# Patient Record
Sex: MALE | Race: ASIAN | Employment: OTHER | URBAN - METROPOLITAN AREA
[De-identification: names, ages, dates, MRNs, and addresses within clinical notes are randomized per-mention and may not be internally consistent; named-entity substitution may affect disease eponyms.]

---

## 2017-01-05 ENCOUNTER — TELEPHONE (OUTPATIENT)
Dept: INTERNAL MEDICINE | Facility: CLINIC | Age: 77
End: 2017-01-05

## 2017-01-05 DIAGNOSIS — E11.21 TYPE 2 DIABETES MELLITUS WITH DIABETIC NEPHROPATHY, WITHOUT LONG-TERM CURRENT USE OF INSULIN (H): Primary | ICD-10-CM

## 2017-01-05 NOTE — TELEPHONE ENCOUNTER
[facility] sent fax requesting order for new Contour Next Glucometer for pt.     Last office visit: 10/26/16    New order placed for pt.

## 2017-01-09 DIAGNOSIS — E11.21 TYPE 2 DIABETES MELLITUS WITH DIABETIC NEPHROPATHY (H): Primary | ICD-10-CM

## 2017-01-09 DIAGNOSIS — I10 ESSENTIAL HYPERTENSION WITH GOAL BLOOD PRESSURE LESS THAN 140/90: ICD-10-CM

## 2017-01-09 NOTE — TELEPHONE ENCOUNTER
Losartan      Last Written Prescription Date: 07/26/16  Last Fill Quantity: 90, # refills: 1  Last Office Visit with G, P or [facility] prescribing provider: 10/26/16   Next 5 appointments (look out 90 days)     Jan 25, 2017  2:00 PM   SHORT with Liam Esquivel MD   [facility] ([facility])    [address]  [facility] [address]   [phone]                   POTASSIUM   Date Value Ref Range Status   07/19/2016 4.0 3.4 - 5.3 mmol/L Final     CREATININE   Date Value Ref Range Status   07/19/2016 1.07 0.66 - 1.25 mg/dL Final     BP Readings from Last 3 Encounters:   10/26/16 118/60   07/26/16 130/60   04/06/16 140/76

## 2017-01-11 RX ORDER — LOSARTAN POTASSIUM 50 MG/1
50 TABLET ORAL DAILY
Qty: 90 TABLET | Refills: 1 | Status: SHIPPED | OUTPATIENT
Start: 2017-01-11 | End: 2017-09-06

## 2017-01-19 DIAGNOSIS — E78.5 HYPERLIPIDEMIA LDL GOAL <100: ICD-10-CM

## 2017-01-19 DIAGNOSIS — E11.21 TYPE 2 DIABETES MELLITUS WITH DIABETIC NEPHROPATHY (H): ICD-10-CM

## 2017-01-19 LAB
ALBUMIN SERPL-MCNC: 3.9 G/DL (ref 3.4–5)
ALP SERPL-CCNC: 57 U/L (ref 40–150)
ALT SERPL W P-5'-P-CCNC: 32 U/L (ref 0–70)
ANION GAP SERPL CALCULATED.3IONS-SCNC: 7 MMOL/L (ref 3–14)
AST SERPL W P-5'-P-CCNC: 26 U/L (ref 0–45)
BILIRUB SERPL-MCNC: 0.9 MG/DL (ref 0.2–1.3)
BUN SERPL-MCNC: 12 MG/DL (ref 7–30)
CALCIUM SERPL-MCNC: 9 MG/DL (ref 8.5–10.1)
CHLORIDE SERPL-SCNC: 104 MMOL/L (ref 94–109)
CHOLEST SERPL-MCNC: 133 MG/DL
CO2 SERPL-SCNC: 27 MMOL/L (ref 20–32)
CREAT SERPL-MCNC: 1.03 MG/DL (ref 0.66–1.25)
GFR SERPL CREATININE-BSD FRML MDRD: 70 ML/MIN/1.7M2
GLUCOSE SERPL-MCNC: 141 MG/DL (ref 70–99)
HBA1C MFR BLD: 7.5 % (ref 4.3–6)
HDLC SERPL-MCNC: 51 MG/DL
LDLC SERPL CALC-MCNC: 60 MG/DL
NONHDLC SERPL-MCNC: 82 MG/DL
POTASSIUM SERPL-SCNC: 3.7 MMOL/L (ref 3.4–5.3)
PROT SERPL-MCNC: 8.5 G/DL (ref 6.8–8.8)
SODIUM SERPL-SCNC: 138 MMOL/L (ref 133–144)
TRIGL SERPL-MCNC: 108 MG/DL

## 2017-01-19 PROCEDURE — 36415 COLL VENOUS BLD VENIPUNCTURE: CPT | Performed by: INTERNAL MEDICINE

## 2017-01-19 PROCEDURE — 80053 COMPREHEN METABOLIC PANEL: CPT | Performed by: INTERNAL MEDICINE

## 2017-01-19 PROCEDURE — 83036 HEMOGLOBIN GLYCOSYLATED A1C: CPT | Performed by: INTERNAL MEDICINE

## 2017-01-19 PROCEDURE — 80061 LIPID PANEL: CPT | Performed by: INTERNAL MEDICINE

## 2017-01-25 ENCOUNTER — OFFICE VISIT (OUTPATIENT)
Dept: INTERNAL MEDICINE | Facility: CLINIC | Age: 77
End: 2017-01-25
Payer: COMMERCIAL

## 2017-01-25 VITALS
DIASTOLIC BLOOD PRESSURE: 70 MMHG | OXYGEN SATURATION: 96 % | RESPIRATION RATE: 12 BRPM | HEART RATE: 87 BPM | SYSTOLIC BLOOD PRESSURE: 134 MMHG | WEIGHT: 173 LBS | BODY MASS INDEX: 33.96 KG/M2 | HEIGHT: 60 IN | TEMPERATURE: 97.7 F

## 2017-01-25 DIAGNOSIS — E11.21 TYPE 2 DIABETES MELLITUS WITH DIABETIC NEPHROPATHY, WITHOUT LONG-TERM CURRENT USE OF INSULIN (H): Primary | ICD-10-CM

## 2017-01-25 DIAGNOSIS — I10 ESSENTIAL HYPERTENSION WITH GOAL BLOOD PRESSURE LESS THAN 140/90: ICD-10-CM

## 2017-01-25 DIAGNOSIS — E78.5 HYPERLIPIDEMIA LDL GOAL <100: ICD-10-CM

## 2017-01-25 PROCEDURE — 99214 OFFICE O/P EST MOD 30 MIN: CPT | Performed by: INTERNAL MEDICINE

## 2017-01-25 NOTE — Clinical Note
"[facility]  [address] [place] [address]  [phone]    1/25/2017    Chip CLIFFORD Rimma  [address]   [facility] [address]           Dear Nuria Rimma,    The results of your lab tests are enclosed. Everything looks fine. Unless noted otherwise below, any results that are outside the \"normal\" range are within acceptable limits and are of no concern.    Hemoglobin A1C measures control of diabetes. Your Hemoglobin A1C is shown. The ideal target is under 7.0, although below 8.0 may be acceptable for some patients.    LDL= Bad Cholesterol-- the target is below 100.     HDL= Good Cholesterol-- although this is determined mostly by heredity, exercise and/or medications may sometimes raise this number.    Triglycerides are another type of fat in the blood, and can sometimes be lowered by reducing intake of sweets or excess carbohydrates, alcohol, and by weight reduction if needed.  Sometimes medications are also used.    AST and ALT are liver tests, as are the bilirubin (total and direct), albumin, total protein, and alkaline phosphatase. Yours are all normal.     Urea Nitrogen and Creatinine are kidney tests--yours are normal. GFR stands for Glomerular Filtration Rate, a more complicated estimate of kidney function.    Sodium, Potassium, Chloride, Carbon Dioxide, and Calcium are all normal salts in the bloodstream. Yours all look normal. Your glucose (blood sugar) also looks fine. (You can ignore the anion gap result).     If you have any further questions or problems, please contact our office.    Sincerely,        Liam Esquivel MD  Attachment: Lab results     "

## 2017-01-25 NOTE — PATIENT INSTRUCTIONS
"Diabetes, LDL-Cholesterol and blood pressure all look fine.     See me back in about four months (Late May or early June, 2017).  Schedule a \"lab only\" appointment a few days before your office appointment with me.   "

## 2017-01-25 NOTE — NURSING NOTE
"Chief Complaint   Patient presents with     Diabetes     Lipids     Hypertension       Initial /70 mmHg  Pulse 87  Temp(Src) 97.7  F (36.5  C) (Oral)  Resp 12  Ht 4' 9\" (1.448 m)  Wt 173 lb (78.472 kg)  BMI 37.43 kg/m2  SpO2 96% Estimated body mass index is 37.43 kg/(m^2) as calculated from the following:    Height as of this encounter: 4' 9\" (1.448 m).    Weight as of this encounter: 173 lb (78.472 kg).  BP completed using cuff size: large  JBuffie CMA      "

## 2017-01-25 NOTE — PROGRESS NOTES
SUBJECTIVE:                                                    Chip Shanks is a 76 year old male who presents to clinic today for the following health issues:  diabetes mellitus, hyperlipidemia, hypertension.     Diabetes Follow-up    Patient is checking blood sugars: once daily.  Results are as follows:         am - 116-128    Diabetic concerns: None     Symptoms of hypoglycemia (low blood sugar): none     Paresthesias (numbness or burning in feet) or sores: No     Date of last diabetic eye exam: 3 months ago     Hyperlipidemia Follow-Up      Rate your low fat/cholesterol diet?: good    Taking statin?  Yes, no muscle aches from statin    Other lipid medications/supplements?:  none     Hypertension Follow-up      Outpatient blood pressures are being checked at home.  Results are 110-130/68.    Low Salt Diet: no added salt       Amount of exercise or physical activity: 6-7 days/week for an average of less than 15 minutes    Problems taking medications regularly: No    Medication side effects: none    Diet: low salt and low fat/cholesterol      Patient states he only takes metformin, no insulin. He relates his AM blood sugars are consistently below 130.    Past records reviewed and discussed for:  Controlled heartburn.   Walking daily.     Problem list and histories reviewed & adjusted, as indicated.  Additional history: as documented    Current Outpatient Prescriptions   Medication Sig Dispense Refill     losartan (COZAAR) 50 MG tablet Take 1 tablet (50 mg) by mouth daily 90 tablet 1     blood glucose monitoring ([employer] CONTOUR MONITOR) meter device kit Use to test blood sugars 2 times daily or as directed. 1 kit 0     simvastatin (ZOCOR) 40 MG tablet Take 1 tablet (40 mg) by mouth daily 90 tablet 0     atenolol (TENORMIN) 50 MG tablet Take 1 tablet (50 mg) by mouth daily 90 tablet 0     metFORMIN (GLUCOPHAGE) 500 MG tablet Take 1 tablet (500 mg) by mouth 2 times daily (with meals) with breakfast and dinner.  180 tablet 0     order for DME Equipment being ordered: Heating pad. 1 each 1     meclizine (ANTIVERT) 25 MG tablet Take 1 tablet (25 mg) by mouth 3 times daily as needed 40 tablet 1     blood glucose monitoring ([employer] CONTOUR) test strip 1 strip by In Vitro route 2 times daily or as directed 200 each 3     blood glucose monitoring (VICTORIAN[employer] MICROLET) lancets 1 each 2 times daily Dispense 200 lancets. 2 Box 3     ranitidine (ZANTAC) 150 MG tablet Take 1 tablet (150 mg) by mouth daily 90 tablet 1     cetirizine (ZYRTEC) 10 MG tablet Take 1 tablet (10 mg) by mouth daily 90 tablet 3     ACETAMINOPHEN PO Take 500 mg by mouth every 8 hours as needed for pain (knee pain)       ORDER FOR DME Equipment being ordered: Cane 1 Units 1     Recent Labs   Lab Test  01/19/17   0824  10/26/16   1449  07/19/16   0759   02/19/16   0759   07/09/14   0758   A1C  7.5*  8.0*  8.0*   --   7.4*   < >  8.6*   LDL  60   --   56   --   55   < >  79   HDL  51   --   49   --   48   < >  41   TRIG  108   --   149   --   143   < >  170*   ALT  32   --   48   --   51   < >  51   CR  1.03   --   1.07   < >  0.90   < >  1.13   GFRESTIMATED  70   --   67   < >  82   < >  64   GFRESTBLACK  85   --   81   < >  >90   GFR Calc     < >  77   POTASSIUM  3.7   --   4.0   < >  3.8   < >  4.3   TSH   --    --   1.33   --    --    --   1.50    < > = values in this interval not displayed.      BP Readings from Last 3 Encounters:   01/25/17 134/70   10/26/16 118/60   07/26/16 130/60    Wt Readings from Last 3 Encounters:   01/25/17 78.472 kg (173 lb)   10/26/16 79.379 kg (175 lb)   07/26/16 79.924 kg (176 lb 3.2 oz)           No dyspnea or cough. No chest discomfort, dizziness or palpitations. No diarrhea, abdominal pain or rectal bleeding.   No acute problems with vision or speech, lateralizing weakness or paresthesias.    No dyspnea or cough. No chest discomfort, dizziness or palpitations. No diarrhea, abdominal pain or rectal bleeding.   No acute  "problems with vision or speech, lateralizing weakness or paresthesias.    ROS: as above or negative for Respiratory, CV, GI, endocrine, neuro systems.     OBJECTIVE:                                                    /70 mmHg  Pulse 87  Temp(Src) 97.7  F (36.5  C) (Oral)  Resp 12  Ht 1.448 m (4' 9\")  Wt 78.472 kg (173 lb)  BMI 37.43 kg/m2  SpO2 96%  Body mass index is 37.43 kg/(m^2).  GENERAL: healthy, alert and no distress  RESP: lungs clear to auscultation - no rales, rhonchi or wheezes  CV: regular rate and rhythm, normal S1 S2, no S3 or S4, no murmur, click or rub, no peripheral edema and peripheral pulses strong  MS: no gross musculoskeletal defects noted, no edema  NEURO: Normal strength and tone, mentation intact and speech normal  PSYCH: mentation appears normal, affect normal/bright       ASSESSMENT/PLAN:                                                    (E11.21) Type 2 diabetes mellitus with diabetic nephropathy, without long-term current use of insulin (H)  (primary encounter diagnosis)  Comment: satisfactorily controlled. Continue current meds.     (I10) Essential hypertension with goal blood pressure less than 140/90  Comment: BP at target. Continue current meds.     (E78.5) Hyperlipidemia LDL goal <100  Comment: Recent LDL at target. Continue current meds.     Patient Instructions   Diabetes, LDL-Cholesterol and blood pressure all look fine.     See me back in about four months (Late May or early June, 2017).  Schedule a \"lab only\" appointment a few days before your office appointment with me.     FUTURE APPOINTMENTS:       - Schedule fasting labs in 4 months, then make appointment to review    Liam Esquivel MD  [facility]    This document serves as a record of the services and decisions personally performed and made by Liam Esquivel MD. It was created on his behalf by Gaye Hager, a trained medical scribe. The creation of this document is based on the provider's " statements to the medical scribe.  Gaye Hager January 25, 2017 2:42 PM

## 2017-01-25 NOTE — MR AVS SNAPSHOT
"              After Visit Summary   1/25/2017    Chip Shanks    MRN: [id number]           Patient Information     Date Of Birth          1940        Visit Information        Provider Department      1/25/2017 1:45 PM Liam Esquivel MD; [employer] [facility]        Today's Diagnoses     Type 2 diabetes mellitus with diabetic nephropathy, without long-term current use of insulin (H)    -  1     Essential hypertension with goal blood pressure less than 140/90         Hyperlipidemia LDL goal <100           Care Instructions    Diabetes, LDL-Cholesterol and blood pressure all look fine.     See me back in about four months (Late May or early June, 2017).  Schedule a \"lab only\" appointment a few days before your office appointment with me.         Follow-ups after your visit        Who to contact     If you have questions or need follow up information about today's clinic visit or your schedule please contact [facility] directly at [phone].  Normal or non-critical lab and imaging results will be communicated to you by MyChart, letter or phone within 4 business days after the clinic has received the results. If you do not hear from us within 7 days, please contact the clinic through [facility] or phone. If you have a critical or abnormal lab result, we will notify you by phone as soon as possible.  Submit refill requests through [facility] or call your pharmacy and they will forward the refill request to us. Please allow 3 business days for your refill to be completed.          Additional Information About Your Visit        [facility] Information     [facility] lets you send messages to your doctor, view your test results, renew your prescriptions, schedule appointments and more. To sign up, go to [email or web address] . Click on \"Log in\" on the left side of the screen, which will take you to the Welcome page. Then click on \"Sign up Now\" on the right side of the " "page.     You will be asked to enter the access code listed below, as well as some personal information. Please follow the directions to create your username and password.     Your access code is: [email or web address]  Expires: 2017  2:42 PM     Your access code will  in 90 days. If you need help or a new code, please call your [facility] or [phone].        Care EveryWhere ID     This is your Care EveryWhere ID. This could be used by other organizations to access your [place] medical records  [id number]        Your Vitals Were     Pulse Temperature Respirations Height BMI (Body Mass Index) Pulse Oximetry    87 97.7  F (36.5  C) (Oral) 12 4' 9\" (1.448 m) 37.43 kg/m2 96%       Blood Pressure from Last 3 Encounters:   17 134/70   10/26/16 118/60   16 130/60    Weight from Last 3 Encounters:   17 173 lb (78.472 kg)   10/26/16 175 lb (79.379 kg)   16 176 lb 3.2 oz (79.924 kg)              Today, you had the following     No orders found for display       Primary Care Provider Office Phone # Fax #    Liam Esquivel [phone]       [facility] [address] [place] [address]        Thank you!     Thank you for choosing [facility]  for your care. Our goal is always to provide you with excellent care. Hearing back from our patients is one way we can continue to improve our services. Please take a few minutes to complete the written survey that you may receive in the mail after your visit with us. Thank you!             Your Updated Medication List - Protect others around you: Learn how to safely use, store and throw away your medicines at www.disposemymeds.org.          This list is accurate as of: 17  2:42 PM.  Always use your most recent med list.                   Brand Name Dispense Instructions for use    ACETAMINOPHEN PO      Take 500 mg by mouth every 8 hours as needed for pain (knee pain)       atenolol 50 MG " tablet    TENORMIN    90 tablet    Take 1 tablet (50 mg) by mouth daily       blood glucose monitoring lancets     2 Box    1 each 2 times daily Dispense 200 lancets.       blood glucose monitoring meter device kit     1 kit    Use to test blood sugars 2 times daily or as directed.       blood glucose monitoring test strip    VICTORIAN[employer] CONTOUR    200 each    1 strip by In Vitro route 2 times daily or as directed       cetirizine 10 MG tablet    zyrTEC    90 tablet    Take 1 tablet (10 mg) by mouth daily       losartan 50 MG tablet    COZAAR    90 tablet    Take 1 tablet (50 mg) by mouth daily       meclizine 25 MG tablet    ANTIVERT    40 tablet    Take 1 tablet (25 mg) by mouth 3 times daily as needed       metFORMIN 500 MG tablet    GLUCOPHAGE    180 tablet    Take 1 tablet (500 mg) by mouth 2 times daily (with meals) with breakfast and dinner.       order for DME     1 Units    Equipment being ordered: Cane       order for DME     1 each    Equipment being ordered: Heating pad.       ranitidine 150 MG tablet    ZANTAC    90 tablet    Take 1 tablet (150 mg) by mouth daily       simvastatin 40 MG tablet    ZOCOR    90 tablet    Take 1 tablet (40 mg) by mouth daily

## 2017-03-25 DIAGNOSIS — K21.9 GASTROESOPHAGEAL REFLUX DISEASE WITHOUT ESOPHAGITIS: ICD-10-CM

## 2017-03-25 DIAGNOSIS — L50.9 URTICARIA: ICD-10-CM

## 2017-03-27 DIAGNOSIS — I10 ESSENTIAL HYPERTENSION: ICD-10-CM

## 2017-03-27 DIAGNOSIS — E78.5 HYPERLIPIDEMIA LDL GOAL <100: ICD-10-CM

## 2017-03-27 RX ORDER — SIMVASTATIN 40 MG
40 TABLET ORAL DAILY
Qty: 90 TABLET | Refills: 0 | Status: SHIPPED | OUTPATIENT
Start: 2017-03-27 | End: 2017-06-20

## 2017-03-27 RX ORDER — CETIRIZINE HYDROCHLORIDE 10 MG/1
TABLET ORAL
Qty: 90 TABLET | Refills: 0 | Status: SHIPPED | OUTPATIENT
Start: 2017-03-27 | End: 2017-06-20

## 2017-03-27 RX ORDER — ATENOLOL 50 MG/1
50 TABLET ORAL DAILY
Qty: 90 TABLET | Refills: 0 | Status: SHIPPED | OUTPATIENT
Start: 2017-03-27 | End: 2017-06-20

## 2017-03-27 NOTE — TELEPHONE ENCOUNTER
Atenolol      Last Written Prescription Date: 12/21/16  Last Fill Quantity: 90, # refills: 0    Last Office Visit with [facility] prescribing provider:  01/25/17   Future Office Visit:    Next 5 appointments (look out 90 days)     May 31, 2017  2:00 PM CDT   SHORT with Liam Esquivel MD   [facility] ([facility]    [address]  [facility] [address]   [phone]                    BP Readings from Last 3 Encounters:   01/25/17 134/70   10/26/16 118/60   07/26/16 130/60     Simvastatin     Last Written Prescription Date: 12/21/16  Last Fill Quantity: 90, # refills: 0  Last Office Visit with [facility], [facility] or [facility] prescribing provider: 01/25/17  Next 5 appointments (look out 90 days)     May 31, 2017  2:00 PM CDT   SHORT with Liam Esquivel MD   [facility] ([facility])    [address] [place]  [facility] [address]   [phone]                   Lab Results   Component Value Date    CHOL 133 01/19/2017     Lab Results   Component Value Date    HDL 51 01/19/2017     Lab Results   Component Value Date    LDL 60 01/19/2017     Lab Results   Component Value Date    TRIG 108 01/19/2017     Lab Results   Component Value Date    CHOLHDLRATIO 4.6 10/07/2015       Labs showing if normal/abnormal  Lab Results   Component Value Date    CHOL 133 01/19/2017    TRIG 108 01/19/2017    HDL 51 01/19/2017    LDL 60 01/19/2017    VLDL 30 10/07/2015    CHOLHDLRATIO 4.6 10/07/2015

## 2017-03-27 NOTE — TELEPHONE ENCOUNTER
Cetirizine      Last Written Prescription Date: 03/24/16  Last Fill Quantity: 90,  # refills: 3   Last Office Visit with [facility] or  [facility] prescribing provider: 01/25/17                                         Next 5 appointments (look out 90 days)     May 31, 2017  2:00 PM CDT   SHORT with Liam Esquivel MD   [facility] ([facility]    [address] [place]  [facility] [address]   [phone]                 Ranitidine      Last Written Prescription Date: 03/24/16  Last Fill Quantity: 90,  # refills: 1   Last Office Visit with [facility], [facility] or [facility] prescribing provider: 01/25/17                                         Next 5 appointments (look out 90 days)     May 31, 2017  2:00 PM CDT   SHORT with Liam Esquivel MD   [facility] ([facility])    [address]  [facility] [address]   [phone]

## 2017-04-10 ENCOUNTER — CARE COORDINATION (OUTPATIENT)
Dept: GERIATRIC MEDICINE | Facility: CLINIC | Age: 77
End: 2017-04-10

## 2017-04-10 NOTE — PROGRESS NOTES
[facility] Six-Month Telephone Assessment    6 month telephone assessment completed on 4/10/2017  EPIC notes reviewed.  Call placed to client with an  through OPI  Client reports that he is fine. Denies falls, reports that he cont to exercise by walking several times/wk.  Noted client did have his HCD notarized and is now in [facility]. Client had no questions or concerns for CM.    ER visits: No  Hospitalizations: No  TCU stays: No  Significant health status changes: none reported   Falls/Injuries: No  ADL/IADL changes: No  Changes in services: No    Caregiver Assessment follow up:  N/A     Goals: See POC in chart for goal progress documentation.    Met HCD goal, met goal of no falls, met goal of receiving flu vaccine.  Client reports pain has improved with exercise and pain medications as needed.     Will see client in 6 months for an annual health risk assessment.   Encouraged client to call CM with any questions or concerns in the meantime.   Emerita Pineda RN, BC  Supervisor [facility]   [phone] (Fax)

## 2017-05-24 DIAGNOSIS — E11.21 TYPE 2 DIABETES MELLITUS WITH DIABETIC NEPHROPATHY (H): ICD-10-CM

## 2017-05-24 DIAGNOSIS — E78.5 HYPERLIPIDEMIA LDL GOAL <100: ICD-10-CM

## 2017-05-24 LAB — HBA1C MFR BLD: 8.7 % (ref 4.3–6)

## 2017-05-24 PROCEDURE — 83036 HEMOGLOBIN GLYCOSYLATED A1C: CPT | Performed by: INTERNAL MEDICINE

## 2017-05-24 PROCEDURE — 80053 COMPREHEN METABOLIC PANEL: CPT | Performed by: INTERNAL MEDICINE

## 2017-05-24 PROCEDURE — 36415 COLL VENOUS BLD VENIPUNCTURE: CPT | Performed by: INTERNAL MEDICINE

## 2017-05-24 PROCEDURE — 80061 LIPID PANEL: CPT | Performed by: INTERNAL MEDICINE

## 2017-05-25 LAB
ALBUMIN SERPL-MCNC: 4 G/DL (ref 3.4–5)
ALP SERPL-CCNC: 48 U/L (ref 40–150)
ALT SERPL W P-5'-P-CCNC: 55 U/L (ref 0–70)
ANION GAP SERPL CALCULATED.3IONS-SCNC: 10 MMOL/L (ref 3–14)
AST SERPL W P-5'-P-CCNC: 32 U/L (ref 0–45)
BILIRUB SERPL-MCNC: 0.7 MG/DL (ref 0.2–1.3)
BUN SERPL-MCNC: 13 MG/DL (ref 7–30)
CALCIUM SERPL-MCNC: 9.2 MG/DL (ref 8.5–10.1)
CHLORIDE SERPL-SCNC: 103 MMOL/L (ref 94–109)
CHOLEST SERPL-MCNC: 124 MG/DL
CO2 SERPL-SCNC: 24 MMOL/L (ref 20–32)
CREAT SERPL-MCNC: 1.01 MG/DL (ref 0.66–1.25)
GFR SERPL CREATININE-BSD FRML MDRD: 72 ML/MIN/1.7M2
GLUCOSE SERPL-MCNC: 155 MG/DL (ref 70–99)
HDLC SERPL-MCNC: 43 MG/DL
LDLC SERPL CALC-MCNC: 60 MG/DL
NONHDLC SERPL-MCNC: 81 MG/DL
POTASSIUM SERPL-SCNC: 3.6 MMOL/L (ref 3.4–5.3)
PROT SERPL-MCNC: 8.3 G/DL (ref 6.8–8.8)
SODIUM SERPL-SCNC: 137 MMOL/L (ref 133–144)
TRIGL SERPL-MCNC: 103 MG/DL

## 2017-05-31 ENCOUNTER — OFFICE VISIT (OUTPATIENT)
Dept: INTERNAL MEDICINE | Facility: CLINIC | Age: 77
End: 2017-05-31
Payer: COMMERCIAL

## 2017-05-31 VITALS
HEART RATE: 92 BPM | TEMPERATURE: 98 F | DIASTOLIC BLOOD PRESSURE: 68 MMHG | BODY MASS INDEX: 34.75 KG/M2 | WEIGHT: 177 LBS | SYSTOLIC BLOOD PRESSURE: 130 MMHG | OXYGEN SATURATION: 97 % | HEIGHT: 60 IN | RESPIRATION RATE: 14 BRPM

## 2017-05-31 DIAGNOSIS — E78.5 HYPERLIPIDEMIA LDL GOAL <100: ICD-10-CM

## 2017-05-31 DIAGNOSIS — E11.21 TYPE 2 DIABETES MELLITUS WITH DIABETIC NEPHROPATHY, WITHOUT LONG-TERM CURRENT USE OF INSULIN (H): Primary | ICD-10-CM

## 2017-05-31 DIAGNOSIS — E66.01 MORBID OBESITY, UNSPECIFIED OBESITY TYPE (H): ICD-10-CM

## 2017-05-31 DIAGNOSIS — I10 ESSENTIAL HYPERTENSION WITH GOAL BLOOD PRESSURE LESS THAN 140/90: ICD-10-CM

## 2017-05-31 PROCEDURE — 99214 OFFICE O/P EST MOD 30 MIN: CPT | Performed by: INTERNAL MEDICINE

## 2017-05-31 PROCEDURE — 99207 C FOOT EXAM  NO CHARGE: CPT | Performed by: INTERNAL MEDICINE

## 2017-05-31 NOTE — NURSING NOTE
"Chief Complaint   Patient presents with     Diabetes       Initial /68  Pulse 92  Temp 98  F (36.7  C) (Oral)  Resp 14  Ht 4' 9\" (1.448 m)  Wt 177 lb (80.3 kg)  SpO2 97%  BMI 38.3 kg/m2 Estimated body mass index is 38.3 kg/(m^2) as calculated from the following:    Height as of this encounter: 4' 9\" (1.448 m).    Weight as of this encounter: 177 lb (80.3 kg).  Medication Reconciliation: complete   Rubi GUZMAN      "

## 2017-05-31 NOTE — LETTER
"[facility]  [address] [place] [address]  [phone]    5/31/2017    Chip CLIFFORD Rimma  [address]   [facility] [address]           Dear Nuria Rimma,    The results of your lab tests are enclosed. Unless noted otherwise below, any results that are outside the \"normal\" range are within acceptable limits and are of no concern.    Hemoglobin A1C measures control of diabetes. Your Hemoglobin A1C is 8.7. The target is under 8.0.    LDL= Bad Cholesterol-- the target is below 100.     HDL= Good Cholesterol-- although this is determined mostly by heredity, exercise and/or medications may sometimes raise this number.    Triglycerides are another type of fat in the blood, and can sometimes be lowered by reducing intake of sweets or excess carbohydrates, alcohol, and by weight reduction if needed.  Sometimes medications are also used.    AST and ALT are liver tests, as are the bilirubin (total and direct), albumin, total protein, and alkaline phosphatase. Yours are all normal.     Urea Nitrogen and Creatinine are kidney tests--yours are normal. GFR stands for Glomerular Filtration Rate, a more complicated estimate of kidney function.    Sodium, Potassium, Chloride, Carbon Dioxide, and Calcium are all normal salts in the bloodstream. Yours all look normal. Your glucose (blood sugar) also looks fine. (You can ignore the anion gap result).     If you have any further questions or problems, please contact our office.    Sincerely,        Liam Esquivel MD  Attachment: Lab results     "

## 2017-05-31 NOTE — PROGRESS NOTES
SUBJECTIVE:                                                    Chip Dukes is a 77 year old male who presents to clinic today for the following health issues:    Tinnitus: Chip reports he has been experiencing tinnitus in his ears. He thought that this might have been an adverse effect of metformin, but is advised that this would be uncommon.     Diabetes: Chip presents to the clinic for a follow-up for diabetes.  His AM blood sugars range from .  Mr. dukes reports he has been eating too much.  Last time his glucose improved he did so by cutting down on his rice consumption. He denies diabetic concerns, symptoms of low blood sugar, paresthesias, problems taking medications, and side effects on medications.  Of note he is exercising 6-7 days/week, walking for an average of 45-60 minutes.       Diabetes Follow-up    Patient is checking blood sugars: once daily.  Results are as follows:         am -     Diabetic concerns: None     Symptoms of hypoglycemia (low blood sugar): none     Paresthesias (numbness or burning in feet) or sores: No     Date of last diabetic eye exam: unknown    Amount of exercise or physical activity: 6-7 days/week for an average of 45-60 minutes    Problems taking medications regularly: No    Medication side effects: none    Diet: low salt, low fat/cholesterol and carbohydrate counting       Problem list and histories reviewed & adjusted, as indicated.  Additional history: as documented  Reviewed and updated as needed this visit by clinical staff  Tobacco  Allergies  Meds  Med Hx  Surg Hx  Fam Hx  Soc Hx      Reviewed and updated as needed this visit by Provider         No dyspnea or cough. No chest discomfort, dizziness or palpitations. No diarrhea, abdominal pain or rectal bleeding.   No acute problems with vision or speech, lateralizing weakness or paresthesias.    ROS: as above or negative for Respiratory, CV, GI, endocrine, neuro systems.    OBJECTIVE:            "                                         /68  Pulse 92  Temp 98  F (36.7  C) (Oral)  Resp 14  Ht 1.448 m (4' 9\")  Wt 80.3 kg (177 lb)  SpO2 97%  BMI 38.3 kg/m2  Body mass index is 38.3 kg/(m^2).     GENERAL: healthy, alert and no distress Morbidly Obese   EYES: Eyes grossly normal to inspection, PERRL and conjunctivae and sclerae normal  NECK: no adenopathy, no asymmetry, masses, or scars and thyroid normal to palpation  RESP: lungs clear to auscultation - no rales, rhonchi or wheezes  CV: regular rate and rhythm, normal S1 S2, no S3 or S4, no murmur, click or rub, no peripheral edema and peripheral pulses strong    MS: no gross musculoskeletal defects noted, no edema  SKIN: no suspicious lesions or rashes  NEURO: Normal strength and tone, mentation intact and speech normal  PSYCH: mentation appears normal, affect normal/bright  Diabetic foot exam: normal DP and PT pulses, no trophic changes or ulcerative lesions and normal monofilament exam        ASSESSMENT/PLAN:                                                    (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 Work on carbohydrate choices/portions. Raise metformin to 1000 mg po BID.   Plan: FOOT EXAM, Albumin Random Urine Quantitative,         Hemoglobin A1c, TSH with free T4 reflex,         metFORMIN (GLUCOPHAGE) 1000 MG tablet          (E66.01) Morbid obesity, unspecified obesity type (H)  Comment: Work on non-Rx measures as noted above.   Wt Readings from Last 5 Encounters:   05/31/17 80.3 kg (177 lb)   01/25/17 78.5 kg (173 lb)   10/26/16 79.4 kg (175 lb)   07/26/16 79.9 kg (176 lb 3.2 oz)   04/06/16 77.6 kg (171 lb 1.6 oz)     (E78.5) Hyperlipidemia LDL goal <100  Comment:   Recent Labs   Lab Test  05/24/17   0809  01/19/17   0824   10/07/15   0814  06/12/15   0807   CHOL  124  133   < >  196  160   HDL  43  51   < >  43  42   LDL  " "60  60   < >  123  83   TRIG  103  108   < >  151*  174*   CHOLHDLRATIO   --    --    --   4.6  3.8    < > = values in this interval not displayed.   Recent LDL at target. Continue current meds.     (I10) Essential hypertension with goal blood pressure less than 140/90  Comment:   BP Readings from Last 3 Encounters:   05/31/17 130/68   01/25/17 134/70   10/26/16 118/60   Plan: BP at target. Continue current meds.     Patient Instructions   Blood pressure and LDL-Cholesterol look great today!    Diabetes not controlled well on most recent lab test.     Recommend increasing Metformin to TWO pills with breakfast and TWO pills with supper.   Also, try to reduce portion sizes of foods like rice, pasta, and sweets.   Keep up the good work with walking.     See me back in about three months, with a \"lab only\" appointment a few days in advance.           Liam Esquivel MD,   [facility]    "

## 2017-05-31 NOTE — MR AVS SNAPSHOT
"              After Visit Summary   5/31/2017    Chip Shanks    MRN: [id number]           Patient Information     Date Of Birth          1940        Visit Information        Provider Department      5/31/2017 1:45 PM Liam Esquivel MD; [employer] [facility]        Today's Diagnoses     Type 2 diabetes mellitus with diabetic nephropathy, without long-term current use of insulin (H)    -  1    Morbid obesity, unspecified obesity type (H)        Hyperlipidemia LDL goal <100        Essential hypertension with goal blood pressure less than 140/90          Care Instructions    Blood pressure and LDL-Cholesterol look great today!    Diabetes not controlled well on most recent lab test.     Recommend increasing Metformin to TWO pills with breakfast and TWO pills with supper.   Also, try to reduce portion sizes of foods like rice, pasta, and sweets.   Keep up the good work with walking.     See me back in about three months, with a \"lab only\" appointment a few days in advance.               Follow-ups after your visit        Future tests that were ordered for you today     Open Future Orders        Priority Expected Expires Ordered    Albumin Random Urine Quantitative Routine 8/31/2017 11/30/2017 5/31/2017    Hemoglobin A1c Routine 8/31/2017 11/30/2017 5/31/2017    TSH with free T4 reflex Routine 8/31/2017 11/30/2017 5/31/2017            Who to contact     If you have questions or need follow up information about today's clinic visit or your schedule please contact [facility] directly at [phone].  Normal or non-critical lab and imaging results will be communicated to you by MyChart, letter or phone within 4 business days after the clinic has received the results. If you do not hear from us within 7 days, please contact the clinic through MyChart or phone. If you have a critical or abnormal lab result, we will notify you by phone as soon as possible.  Submit " "refill requests through [employer] or call your pharmacy and they will forward the refill request to us. Please allow 3 business days for your refill to be completed.          Additional Information About Your Visit        [employer] Information     [employer] lets you send messages to your doctor, view your test results, renew your prescriptions, schedule appointments and more. To sign up, go to [email or web address] . Click on \"Log in\" on the left side of the screen, which will take you to the Welcome page. Then click on \"Sign up Now\" on the right side of the page.     You will be asked to enter the access code listed below, as well as some personal information. Please follow the directions to create your username and password.     Your access code is: [email or web address]  Expires: 2017  3:08 PM     Your access code will  in 90 days. If you need help or a new code, please call your [place] clinic or [phone].        Care EveryWhere ID     This is your Care EveryWhere ID. This could be used by other organizations to access your [place] medical records  [id number]        Your Vitals Were     Pulse Temperature Respirations Height Pulse Oximetry BMI (Body Mass Index)    92 98  F (36.7  C) (Oral) 14 4' 9\" (1.448 m) 97% 38.3 kg/m2       Blood Pressure from Last 3 Encounters:   17 130/68   17 134/70   10/26/16 118/60    Weight from Last 3 Encounters:   17 177 lb (80.3 kg)   17 173 lb (78.5 kg)   10/26/16 175 lb (79.4 kg)              We Performed the Following     FOOT EXAM          Today's Medication Changes          These changes are accurate as of: 17  3:08 PM.  If you have any questions, ask your nurse or doctor.               These medicines have changed or have updated prescriptions.        Dose/Directions    metFORMIN 1000 MG tablet   Commonly known as:  GLUCOPHAGE   This may have changed:    - medication strength  - how much to take  - additional instructions   Used for:  Type 2 " diabetes mellitus with diabetic nephropathy, without long-term current use of insulin (H)   Changed by:  Liam Esquivel MD        Dose:  1000 mg   Take 1 tablet (1,000 mg) by mouth 2 times daily (with meals)   Quantity:  180 tablet   Refills:  0            Where to get your medicines      These medications were sent to [id number] - [place], [place] - [address] AT [facility] [address]  [address], [facility] [address]     Phone:  [phone]     metFORMIN 1000 MG tablet                Primary Care Provider Office Phone # Fax #    Liam Esquivel [phone]       [facility] [address]  [facility] [address]        Thank you!     Thank you for choosing [facility]  for your care. Our goal is always to provide you with excellent care. Hearing back from our patients is one way we can continue to improve our services. Please take a few minutes to complete the written survey that you may receive in the mail after your visit with us. Thank you!             Your Updated Medication List - Protect others around you: Learn how to safely use, store and throw away your medicines at www.disposemymeds.org.          This list is accurate as of: 5/31/17  3:08 PM.  Always use your most recent med list.                   Brand Name Dispense Instructions for use    ACETAMINOPHEN PO      Take 500 mg by mouth every 8 hours as needed for pain (knee pain)       atenolol 50 MG tablet    TENORMIN    90 tablet    Take 1 tablet (50 mg) by mouth daily       blood glucose monitoring lancets     2 Box    1 each 2 times daily Dispense 200 lancets.       blood glucose monitoring meter device kit     1 kit    Use to test blood sugars 2 times daily or as directed.       blood glucose monitoring test strip    VICTORIANO CONTOUR    200 each    1 strip by In Vitro route 2 times daily or as directed       cetirizine 10 MG tablet    zyrTEC    90 tablet    TAKE 1  TABLET(10 MG) BY MOUTH DAILY       losartan 50 MG tablet    COZAAR    90 tablet    Take 1 tablet (50 mg) by mouth daily       meclizine 25 MG tablet    ANTIVERT    40 tablet    Take 1 tablet (25 mg) by mouth 3 times daily as needed       metFORMIN 1000 MG tablet    GLUCOPHAGE    180 tablet    Take 1 tablet (1,000 mg) by mouth 2 times daily (with meals)       order for DME     1 Units    Equipment being ordered: Cane       order for DME     1 each    Equipment being ordered: Heating pad.       ranitidine 150 MG tablet    ZANTAC    90 tablet    TAKE 1 TABLET(150 MG) BY MOUTH DAILY       simvastatin 40 MG tablet    ZOCOR    90 tablet    Take 1 tablet (40 mg) by mouth daily

## 2017-05-31 NOTE — PATIENT INSTRUCTIONS
"Blood pressure and LDL-Cholesterol look great today!    Diabetes not controlled well on most recent lab test.     Recommend increasing Metformin to TWO pills with breakfast and TWO pills with supper.   Also, try to reduce portion sizes of foods like rice, pasta, and sweets.   Keep up the good work with walking.     See me back in about three months, with a \"lab only\" appointment a few days in advance.       "

## 2017-06-15 ENCOUNTER — CARE COORDINATION (OUTPATIENT)
Dept: GERIATRIC MEDICINE | Facility: CLINIC | Age: 77
End: 2017-06-15

## 2017-06-15 NOTE — PROGRESS NOTES
Rec'd tele call from client stating that he is having difficulty with MA renewal. Client states that he rec'd a letter that his insurance will be closing and he will no longer receive SNAP. Client states that he has completed all the paperwork. CM offered to contact his financial worker, client agreeable.  Voice message left with Danyell BUSBY, [phone] to inquire on the above info. Explained that CM can assist client with paperwork.  Emerita Pineda RN, BC  Supervisor [facility]   [phone] (Fax)     DISCHARGE

## 2017-06-20 DIAGNOSIS — E11.9 TYPE 2 DIABETES MELLITUS WITHOUT COMPLICATION (H): ICD-10-CM

## 2017-06-20 DIAGNOSIS — E78.5 HYPERLIPIDEMIA LDL GOAL <100: ICD-10-CM

## 2017-06-20 DIAGNOSIS — E11.9 TYPE 2 DIABETES, HBA1C GOAL < 7% (H): ICD-10-CM

## 2017-06-20 DIAGNOSIS — I10 ESSENTIAL HYPERTENSION: ICD-10-CM

## 2017-06-20 DIAGNOSIS — K21.9 GASTROESOPHAGEAL REFLUX DISEASE WITHOUT ESOPHAGITIS: ICD-10-CM

## 2017-06-20 DIAGNOSIS — L50.9 URTICARIA: ICD-10-CM

## 2017-06-21 RX ORDER — ATENOLOL 50 MG/1
TABLET ORAL
Qty: 90 TABLET | Refills: 0 | Status: SHIPPED | OUTPATIENT
Start: 2017-06-21 | End: 2017-09-06

## 2017-06-21 RX ORDER — LANCETS
EACH MISCELLANEOUS
Qty: 200 EACH | Refills: 0 | Status: SHIPPED | OUTPATIENT
Start: 2017-06-21 | End: 2017-11-21

## 2017-06-21 RX ORDER — CETIRIZINE HYDROCHLORIDE 10 MG/1
TABLET ORAL
Qty: 90 TABLET | Refills: 0 | Status: SHIPPED | OUTPATIENT
Start: 2017-06-21 | End: 2017-11-19

## 2017-06-21 RX ORDER — SIMVASTATIN 40 MG
TABLET ORAL
Qty: 90 TABLET | Refills: 0 | Status: SHIPPED | OUTPATIENT
Start: 2017-06-21 | End: 2017-09-06

## 2017-06-21 NOTE — PROGRESS NOTES
6/16/17 Out of office message. Rec'd vm from Shanelle at [facility] to report that client needs to complete the shortened renewal form and she also needs a copy of a current bank statement and also rent verification.  6/21/17 Attempted to reach client twice, left voice message requesting a return call.  Emerita Pineda RN, BC  Supervisor [facility]   [phone] (Fax)

## 2017-06-21 NOTE — TELEPHONE ENCOUNTER
Atenolol      Last Written Prescription Date: 03/27/17  Last Fill Quantity: 90, # refills: 0    Last Office Visit with [facility], [facility] or [facility] prescribing provider:  05/31/17   Future Office Visit:    Next 5 appointments (look out 90 days)     Sep 06, 2017  2:00 PM CDT   [facility]    [address]  [facility] [address]   [phone]                    BP Readings from Last 3 Encounters:   05/31/17 130/68   01/25/17 134/70   10/26/16 118/60     Ni test strips AND lancets      Last Written Prescription Date: 04/25/16  BOTH  Last Fill Quantity: 200 AND 2 boxes,  # refills: 3 BOTH   Last Office Visit with [facility], [facility] or [facility] prescribing provider: 05/31/17                                         Next 5 appointments (look out 90 days)     Sep 06, 2017  2:00 PM CDT   [facility]    [address]  [facility] [address]   [phone]                 Cetirizine AND Ranitidine  Last Written Prescription Date: 03/27/17  BOTH  Last Fill Quantity: 90  BOTH,  # refills: 0 BOTH  Last Office Visit with [facility], [facility] or [facility] prescribing provider: 05/31/17                                         Next 5 appointments (look out 90 days)     Sep 06, 2017  2:00 PM CDT   [facility]    [address]  [facility] [address]   [phone]                 Simvastatin     Last Written Prescription Date: 03/27/17  Last Fill Quantity: 90, # refills: 0  Last Office Visit with [facility], [facility] or [facility] prescribing provider: 05/31/17  Next 5 appointments (look out 90 days)     Sep 06, 2017  2:00 PM CDT   [facility]    [address]  [facility] [address]   [phone]                   Lab  Results   Component Value Date    CHOL 124 05/24/2017     Lab Results   Component Value Date    HDL 43 05/24/2017     Lab Results   Component Value Date    LDL 60 05/24/2017     Lab Results   Component Value Date    TRIG 103 05/24/2017     Lab Results   Component Value Date    CHOLHDLRATIO 4.6 10/07/2015       Labs showing if normal/abnormal  Lab Results   Component Value Date    CHOL 124 05/24/2017    TRIG 103 05/24/2017    HDL 43 05/24/2017    LDL 60 05/24/2017    VLDL 30 10/07/2015    CHOLHDLRATIO 4.6 10/07/2015

## 2017-06-22 NOTE — PROGRESS NOTES
6/21/17 Rec'd vm from client stating that he rec'd the application in the mail.  6/22/2017 Call placed to client to f/u. Client states that his son will assist him with paperwork 6/23/17. Shared info below re bank statement and rent verification. Client states understanding and will contact CM if he has questions.  Client inquired if CM could request a [employer] ID card.  CM to f/u  Emerita Pineda RN, BC  Supervisor [facility]   [phone] (Fax)

## 2017-06-23 ENCOUNTER — CARE COORDINATION (OUTPATIENT)
Dept: GERIATRIC MEDICINE | Facility: CLINIC | Age: 77
End: 2017-06-23

## 2017-06-23 NOTE — PATIENT INSTRUCTIONS
Per CM request, called [facility] to request a new ID card for client. Should be received within 10-14 days.     Renata Valadez  Case Management Specialist  [facility]  [phone]

## 2017-07-03 NOTE — PROGRESS NOTES
7/3/2017 Rec'd information from [facility] stating that client is eligible for renewable for benefits  Emerita Pineda RN, BC  Supervisor [facility]   [phone] (Fax)

## 2017-08-25 ENCOUNTER — CARE COORDINATION (OUTPATIENT)
Dept: GERIATRIC MEDICINE | Facility: CLINIC | Age: 77
End: 2017-08-25

## 2017-08-25 NOTE — PROGRESS NOTES
Call placed to client to schedule annual home visit/HRA. Home visit scheduled for 9/13/17 @ 1 PM.  Call placed to Dianelys Andino to request  Ramone,  Call placed to Ramone to provide information.   Emerita Pineda RN, BC  Supervisor [facility]   [phone] (Fax)

## 2017-08-30 DIAGNOSIS — E11.21 TYPE 2 DIABETES MELLITUS WITH DIABETIC NEPHROPATHY, WITHOUT LONG-TERM CURRENT USE OF INSULIN (H): ICD-10-CM

## 2017-08-30 DIAGNOSIS — E78.5 HYPERLIPIDEMIA LDL GOAL <100: ICD-10-CM

## 2017-08-30 LAB
ALBUMIN SERPL-MCNC: 3.7 G/DL (ref 3.4–5)
ALP SERPL-CCNC: 46 U/L (ref 40–150)
ALT SERPL W P-5'-P-CCNC: 58 U/L (ref 0–70)
ANION GAP SERPL CALCULATED.3IONS-SCNC: 9 MMOL/L (ref 3–14)
AST SERPL W P-5'-P-CCNC: 41 U/L (ref 0–45)
BILIRUB SERPL-MCNC: 0.9 MG/DL (ref 0.2–1.3)
BUN SERPL-MCNC: 7 MG/DL (ref 7–30)
CALCIUM SERPL-MCNC: 8.7 MG/DL (ref 8.5–10.1)
CHLORIDE SERPL-SCNC: 104 MMOL/L (ref 94–109)
CHOLEST SERPL-MCNC: 122 MG/DL
CO2 SERPL-SCNC: 25 MMOL/L (ref 20–32)
CREAT SERPL-MCNC: 0.96 MG/DL (ref 0.66–1.25)
CREAT UR-MCNC: 247 MG/DL
GFR SERPL CREATININE-BSD FRML MDRD: 76 ML/MIN/1.7M2
GLUCOSE SERPL-MCNC: 105 MG/DL (ref 70–99)
HBA1C MFR BLD: 7.9 % (ref 4.3–6)
HDLC SERPL-MCNC: 47 MG/DL
LDLC SERPL CALC-MCNC: 54 MG/DL
MICROALBUMIN UR-MCNC: 168 MG/L
MICROALBUMIN/CREAT UR: 68.02 MG/G CR (ref 0–17)
NONHDLC SERPL-MCNC: 75 MG/DL
POTASSIUM SERPL-SCNC: 3.5 MMOL/L (ref 3.4–5.3)
PROT SERPL-MCNC: 8.2 G/DL (ref 6.8–8.8)
SODIUM SERPL-SCNC: 138 MMOL/L (ref 133–144)
TRIGL SERPL-MCNC: 105 MG/DL
TSH SERPL DL<=0.005 MIU/L-ACNC: 1.72 MU/L (ref 0.4–4)

## 2017-08-30 PROCEDURE — 82043 UR ALBUMIN QUANTITATIVE: CPT | Performed by: INTERNAL MEDICINE

## 2017-08-30 PROCEDURE — 83036 HEMOGLOBIN GLYCOSYLATED A1C: CPT | Performed by: INTERNAL MEDICINE

## 2017-08-30 PROCEDURE — 80061 LIPID PANEL: CPT | Performed by: INTERNAL MEDICINE

## 2017-08-30 PROCEDURE — 36415 COLL VENOUS BLD VENIPUNCTURE: CPT | Performed by: INTERNAL MEDICINE

## 2017-08-30 PROCEDURE — 84443 ASSAY THYROID STIM HORMONE: CPT | Performed by: INTERNAL MEDICINE

## 2017-08-30 PROCEDURE — 80053 COMPREHEN METABOLIC PANEL: CPT | Performed by: INTERNAL MEDICINE

## 2017-08-30 NOTE — PROGRESS NOTES
Rescheduled annual HRA for 9/21/2017 @ 9 AM, as client's preferred  not available 9/13/17. Spoke with client who is agreeable with the change in home visit date.  Call placed to Dianelys Andino to inform of the above information.  Emerita Pineda RN, BC  Supervisor [facility]   [phone] (Fax)

## 2017-09-06 ENCOUNTER — OFFICE VISIT (OUTPATIENT)
Dept: INTERNAL MEDICINE | Facility: CLINIC | Age: 77
End: 2017-09-06
Payer: COMMERCIAL

## 2017-09-06 VITALS
DIASTOLIC BLOOD PRESSURE: 84 MMHG | SYSTOLIC BLOOD PRESSURE: 138 MMHG | HEIGHT: 60 IN | TEMPERATURE: 98.6 F | BODY MASS INDEX: 34.61 KG/M2 | WEIGHT: 176.3 LBS | HEART RATE: 72 BPM | OXYGEN SATURATION: 98 %

## 2017-09-06 DIAGNOSIS — E66.01 MORBID OBESITY DUE TO EXCESS CALORIES (H): ICD-10-CM

## 2017-09-06 DIAGNOSIS — E11.21 TYPE 2 DIABETES MELLITUS WITH DIABETIC NEPHROPATHY, WITHOUT LONG-TERM CURRENT USE OF INSULIN (H): Primary | ICD-10-CM

## 2017-09-06 DIAGNOSIS — I10 ESSENTIAL HYPERTENSION: ICD-10-CM

## 2017-09-06 DIAGNOSIS — I10 ESSENTIAL HYPERTENSION WITH GOAL BLOOD PRESSURE LESS THAN 140/90: ICD-10-CM

## 2017-09-06 DIAGNOSIS — K21.9 GASTROESOPHAGEAL REFLUX DISEASE WITHOUT ESOPHAGITIS: ICD-10-CM

## 2017-09-06 DIAGNOSIS — E78.5 HYPERLIPIDEMIA LDL GOAL <100: ICD-10-CM

## 2017-09-06 PROCEDURE — 99214 OFFICE O/P EST MOD 30 MIN: CPT | Performed by: INTERNAL MEDICINE

## 2017-09-06 PROCEDURE — 99207 C PAF COMPLETED  NO CHARGE: CPT | Performed by: INTERNAL MEDICINE

## 2017-09-06 RX ORDER — ATENOLOL 50 MG/1
TABLET ORAL
Qty: 90 TABLET | Refills: 1 | Status: SHIPPED | OUTPATIENT
Start: 2017-09-06 | End: 2017-09-07

## 2017-09-06 RX ORDER — SIMVASTATIN 40 MG
40 TABLET ORAL AT BEDTIME
Qty: 90 TABLET | Refills: 1 | Status: SHIPPED | OUTPATIENT
Start: 2017-09-06 | End: 2018-01-31

## 2017-09-06 RX ORDER — LOSARTAN POTASSIUM 50 MG/1
50 TABLET ORAL DAILY
Qty: 90 TABLET | Refills: 1 | Status: SHIPPED | OUTPATIENT
Start: 2017-09-06 | End: 2018-03-22

## 2017-09-06 NOTE — PROGRESS NOTES
SUBJECTIVE:   Chip Shanks is a 77 year old male who presents to clinic today for the following health issues:  diabetes mellitus, hyperlipidemia, hypertension, right knee pain, GERD.     Diabetes Follow-up    Patient is checking blood sugars: once daily.  Results are as follows:       am - 108-128    Diabetic concerns: None     Symptoms of hypoglycemia (low blood sugar): none     Paresthesias (numbness or burning in feet) or sores: No   Date of last diabetic eye exam: 2 years ago    Amount of exercise or physical activity: 6-7 days/week for an average of 15-30 minutes    Problems taking medications regularly: No    Medication side effects: none  Diet: low salt, low fat/cholesterol and watching carbohydrate intake     is present with patient today.     Diabetes  Patient's A1c has decreased from 8.7 to 7.9. Patient reports that taking 2000 mg Metformin was causing abdominal cramps so he is currently taking 1000 mg daily.     Hypertension  Patient's second blood pressure was 138/84.     Right knee pain  Patient requested more cold pads to alleviate right knee pain.     Heartburn controlled with ranitidine.     Problem list and histories reviewed & adjusted, as indicated.  Additional history: as documented    Reviewed and updated as needed this visit by clinical staffTobacco  Allergies  Meds  Med Hx  Surg Hx  Fam Hx  Soc Hx      Reviewed and updated as needed this visit by Provider         ROS:  No dyspnea or cough. No chest discomfort, dizziness or palpitations. No diarrhea, abdominal pain or rectal bleeding.   No acute problems with vision or speech, lateralizing weakness or paresthesias.    ROS: as above or negative for Respiratory, CV, GI, endocrine, neuro systems.    This document serves as a record of the services and decisions personally performed and made by Liam Esquivel MD. It was created on his behalf by Becky Gomez, a trained medical scribe. The creation of this document is based on  "the provider's statements to the medical scribe.  Becky Gomez September 6, 2017 2:30 PM     OBJECTIVE:     /82 (BP Location: Left arm, Patient Position: Sitting, Cuff Size: Adult Large)  Pulse 72  Temp 98.6  F (37  C) (Oral)  Ht 1.448 m (4' 9\")  Wt 80 kg (176 lb 4.8 oz)  SpO2 98%  BMI 38.15 kg/m2  Body mass index is 38.15 kg/(m^2).    GENERAL: healthy, alert and no distress  RESP: lungs clear to auscultation - no rales, rhonchi or wheezes  CV: regular rate and rhythm, normal S1 S2, no S3 or S4, no murmur, click or rub, no peripheral edema and peripheral pulses strong  MS: no gross musculoskeletal defects noted, no edema  SKIN: no suspicious lesions or rashes  NEURO: Normal strength and tone, mentation intact and speech normal  PSYCH: mentation appears normal, affect normal/bright    ASSESSMENT/PLAN:   (E11.21) Type 2 diabetes mellitus with diabetic nephropathy, without long-term current use of insulin (H)  (primary encounter diagnosis)  Comment: Patient recently reduced metformin to 1000 mg once daily due to loose stools.   I suggested the patient adjust metformin dosage to two 500 mg tablets in the morning and one 500 mg tablet in the evening to reduce abdominal cramps.   Continue Losartan for microalbuminuria.   Plan: metFORMIN (GLUCOPHAGE) 500 MG tablet, losartan (COZAAR) 50 MG tablet          (E66.01) Obesity: BMI >35 with diabetes  Comment: Recommended watching carbohydrate intake and monitoring diet. Encouraged walking.     (E78.5) Hyperlipidemia LDL goal <100  Comment: LDL at target. Continue current meds.  Plan: simvastatin (ZOCOR) 40 MG tablet          (I10) Essential hypertension with goal blood pressure less than 140/90  Comment: BP at target. Continue current meds.  Plan: losartan (COZAAR) 50 MG tablet  (I10) Essential hypertension  Comment: BP at target. Continue current meds.  Plan: atenolol (TENORMIN) 50 MG tablet          (K21.9) Gastroesophageal reflux disease without " esophagitis  Comment: Controlled. Continue current meds.   Plan: ranitidine (ZANTAC) 150 MG tablet          FUTURE APPOINTMENTS:       - Follow-up visit in 4-6 months     Patient Instructions   Diabetes, LDL-Cholesterol and blood pressure all look fine.     Adjust metformin to 500 mg tablets (instead of 1000 mg), two tablets with breakfast, and one tablet with supper.     No other medication changes needed today.   Refills of all meds sent to your pharmacy.     See me in early 2018, in 4-6 months.       The information in this document, created by the medical scribe for me, accurately reflects the services I personally performed and the decisions made by me. I have reviewed and approved this document for accuracy prior to leaving the patient care area.  September 6, 2017 2:30 PM    Liam Esquivel MD  [facility]

## 2017-09-06 NOTE — LETTER
"    [facility]  [address] [place] [address]  [phone]    9/6/2017    Chip CLIFFORD Rimma  [address]   [facility] [address]           Dear Mr. Shanks,    The results of your lab tests are enclosed. Everything looks fine. Unless noted otherwise below, any results that are outside the \"normal\" range are within acceptable limits and are of no concern.    Hemoglobin A1C measures control of diabetes. Your Hemoglobin A1C is 7.9.   The ideal target is under 7.0, although below 8.0 may be acceptable for some patients.    LDL= Bad Cholesterol-- the target is below 100.     HDL= Good Cholesterol-- although this is determined mostly by heredity, exercise and/or medications may sometimes raise this number.    Triglycerides are another type of fat in the blood, and can sometimes be lowered by reducing intake of sweets or excess carbohydrates, alcohol, and by weight reduction if needed.  Sometimes medications are also used.    AST and ALT are liver tests, as are the bilirubin (total and direct), albumin, total protein, and alkaline phosphatase. Yours are all normal.     Urea Nitrogen and Creatinine are kidney tests--yours are normal. GFR stands for Glomerular Filtration Rate, a more complicated estimate of kidney function.    Sodium, Potassium, Chloride, Carbon Dioxide, and Calcium are all normal salts in the bloodstream. Yours all look normal. Your glucose (blood sugar) also looks fine. (You can ignore the anion gap result).     TSH measures thyroid function. Yours is normal.     The results of your recent urine test showed an elevation in microalbumin, an early indicator of ill-effects to the kidney from diabetes. You are already taking the standard treatment for this situation, which is an ARB medication like Losartan      .     If you have any further questions or problems, please contact our office.    Sincerely,        Liam Esquivel MD  Attachment: Lab results      "

## 2017-09-06 NOTE — MR AVS SNAPSHOT
After Visit Summary   9/6/2017    Chip Shanks    MRN: [id number]           Patient Information     Date Of Birth          1940        Visit Information        Provider Department      9/6/2017 1:45 PM Liam Esquivel MD; [facility]        Today's Diagnoses     Type 2 diabetes mellitus with diabetic nephropathy, without long-term current use of insulin (H)    -  1    Obesity: BMI >35 with diabetes        Hyperlipidemia LDL goal <100        Essential hypertension with goal blood pressure less than 140/90        Gastroesophageal reflux disease without esophagitis        Essential hypertension          Care Instructions    Diabetes, LDL-Cholesterol and blood pressure all look fine.     Adjust metformin to 500 mg tablets (instead of 1000 mg), two tablets with breakfast, and one tablet with supper.     No other medication changes needed today.   Refills of all meds sent to your pharmacy.     See me in early 2018, in 4-6 months.           Follow-ups after your visit        Who to contact     If you have questions or need follow up information about today's clinic visit or your schedule please contact [facility] directly at [phone].  Normal or non-critical lab and imaging results will be communicated to you by [employer], letter or phone within 4 business days after the clinic has received the results. If you do not hear from us within 7 days, please contact the clinic through [employer] or phone. If you have a critical or abnormal lab result, we will notify you by phone as soon as possible.  Submit refill requests through [facility] [employer] [facility] or call your pharmacy and they will forward the refill request to us. Please allow 3 business days for your refill to be completed.          Additional Information About Your Visit        MyChart Information     [facility] lets you send messages to your doctor, view your test results, renew your prescriptions,  "schedule appointments and more. To sign up, go to [email or web address] . Click on \"Log in\" on the left side of the screen, which will take you to the Welcome page. Then click on \"Sign up Now\" on the right side of the page.     You will be asked to enter the access code listed below, as well as some personal information. Please follow the directions to create your username and password.     Your access code is: [email or web address]  Expires: 2017  2:43 PM     Your access code will  in 90 days. If you need help or a new code, please call your [facility] clinic or [phone].        Care EveryWhere ID     This is your Care EveryWhere ID. This could be used by other organizations to access your [facility] medical records  [id number]        Your Vitals Were     Pulse Temperature Height Pulse Oximetry BMI (Body Mass Index)       72 98.6  F (37  C) (Oral) 4' 9\" (1.448 m) 98% 38.15 kg/m2        Blood Pressure from Last 3 Encounters:   17 138/84   17 130/68   17 134/70    Weight from Last 3 Encounters:   17 176 lb 4.8 oz (80 kg)   17 177 lb (80.3 kg)   17 173 lb (78.5 kg)              We Performed the Following     PAF COMPLETED          Today's Medication Changes          These changes are accurate as of: 17  2:43 PM.  If you have any questions, ask your nurse or doctor.               These medicines have changed or have updated prescriptions.        Dose/Directions    atenolol 50 MG tablet   Commonly known as:  TENORMIN   This may have changed:  See the new instructions.   Used for:  Essential hypertension   Changed by:  Liam Esquivel MD        TAKE 1 TABLET(50 MG) BY MOUTH DAILY   Quantity:  90 tablet   Refills:  1       metFORMIN 500 MG tablet   Commonly known as:  GLUCOPHAGE   This may have changed:    - medication strength  - how much to take  - how to take this  - when to take this  - additional instructions   Used for:  Type 2 diabetes mellitus with diabetic " nephropathy, without long-term current use of insulin (H)   Changed by:  Liam Esquivel MD        Two tablets with breakfast, one tablet with supper   Quantity:  270 tablet   Refills:  1       ranitidine 150 MG tablet   Commonly known as:  ZANTAC   This may have changed:  See the new instructions.   Used for:  Gastroesophageal reflux disease without esophagitis   Changed by:  Liam Esquivel MD        TAKE 1 TABLET(150 MG) BY MOUTH DAILY   Quantity:  90 tablet   Refills:  1       simvastatin 40 MG tablet   Commonly known as:  ZOCOR   This may have changed:  See the new instructions.   Used for:  Hyperlipidemia LDL goal <100   Changed by:  Liam Esquivel MD        Dose:  40 mg   Take 1 tablet (40 mg) by mouth At Bedtime   Quantity:  90 tablet   Refills:  1            Where to get your medicines      These medications were sent to [facility] [id number] [place] [address] [address] [place] [address] [address] AT [facility] [address]  [address] [facility] [address], [facility] [address]     Phone:  [phone]     atenolol 50 MG tablet    losartan 50 MG tablet    metFORMIN 500 MG tablet    ranitidine 150 MG tablet    simvastatin 40 MG tablet                Primary Care Provider Office Phone # Fax #    Liam Esquivel [phone]       [address]  [facility] [address]        Equal Access to Services     KARMA MALLOY AH: Hadii destiny ku hadasho Soomaali, waaxda luqadaha, qaybta kaalmada adeegyada, waxay idiin hayaan adefransisca kharamark conley. So [facility] [phone].    ATENCIÓN: Si habla español, tiene a mata disposición servicios gratuitos de asistencia lingüística. [facility] [phone].    We comply with applicable federal civil rights laws and [place] laws. We do not discriminate on the basis of race, color, national origin, age, disability sex, sexual orientation or gender identity.            Thank you!     Thank you for choosing [facility]  for your care. Our goal is always to provide you with  excellent care. Hearing back from our patients is one way we can continue to improve our services. Please take a few minutes to complete the written survey that you may receive in the mail after your visit with us. Thank you!             Your Updated Medication List - Protect others around you: Learn how to safely use, store and throw away your medicines at www.disposemymeds.org.          This list is accurate as of: 9/6/17  2:43 PM.  Always use your most recent med list.                   Brand Name Dispense Instructions for use Diagnosis    ACETAMINOPHEN PO      Take 500 mg by mouth every 8 hours as needed for pain (knee pain)        atenolol 50 MG tablet    TENORMIN    90 tablet    TAKE 1 TABLET(50 MG) BY MOUTH DAILY    Essential hypertension       VICTORIANO CONTOUR test strip   Generic drug:  blood glucose monitoring     200 strip    TEST TWICE DAILY    Type 2 diabetes mellitus without complication (H)       blood glucose monitoring meter device kit     1 kit    Use to test blood sugars 2 times daily or as directed.    Type 2 diabetes mellitus with diabetic nephropathy, without long-term current use of insulin (H)       cetirizine 10 MG tablet    zyrTEC    90 tablet    TAKE 1 TABLET(10 MG) BY MOUTH DAILY    Urticaria       losartan 50 MG tablet    COZAAR    90 tablet    Take 1 tablet (50 mg) by mouth daily    Type 2 diabetes mellitus with diabetic nephropathy, without long-term current use of insulin (H), Essential hypertension with goal blood pressure less than 140/90       meclizine 25 MG tablet    ANTIVERT    40 tablet    Take 1 tablet (25 mg) by mouth 3 times daily as needed    Vertigo       metFORMIN 500 MG tablet    GLUCOPHAGE    270 tablet    Two tablets with breakfast, one tablet with supper    Type 2 diabetes mellitus with diabetic nephropathy, without long-term current use of insulin (H)       MICROLET LANCETS Misc     200 each    TEST TWICE DAILY    Type 2 diabetes, HbA1c goal < 7% (H)       order for DME      1 Units    Equipment being ordered: Cane    Dizziness, Gait instability       order for DME     1 each    Equipment being ordered: Heating pad.    Neck pain       ranitidine 150 MG tablet    ZANTAC    90 tablet    TAKE 1 TABLET(150 MG) BY MOUTH DAILY    Gastroesophageal reflux disease without esophagitis       simvastatin 40 MG tablet    ZOCOR    90 tablet    Take 1 tablet (40 mg) by mouth At Bedtime    Hyperlipidemia LDL goal <100

## 2017-09-06 NOTE — NURSING NOTE
"Chief Complaint   Patient presents with     Diabetes       Initial /82 (BP Location: Left arm, Patient Position: Sitting, Cuff Size: Adult Large)  Pulse 72  Temp 98.6  F (37  C) (Oral)  Ht 4' 9\" (1.448 m)  Wt 176 lb 4.8 oz (80 kg)  SpO2 98%  BMI 38.15 kg/m2 Estimated body mass index is 38.15 kg/(m^2) as calculated from the following:    Height as of this encounter: 4' 9\" (1.448 m).    Weight as of this encounter: 176 lb 4.8 oz (80 kg).  Medication Reconciliation: complete   Rubi GUZMAN      "

## 2017-09-06 NOTE — PATIENT INSTRUCTIONS
Diabetes, LDL-Cholesterol and blood pressure all look fine.     Adjust metformin to 500 mg tablets (instead of 1000 mg), two tablets with breakfast, and one tablet with supper.     No other medication changes needed today.   Refills of all meds sent to your pharmacy.     See me in early 2018, in 4-6 months.

## 2017-09-07 ENCOUNTER — TELEPHONE (OUTPATIENT)
Dept: INTERNAL MEDICINE | Facility: CLINIC | Age: 77
End: 2017-09-07

## 2017-09-07 DIAGNOSIS — I10 ESSENTIAL HYPERTENSION: ICD-10-CM

## 2017-09-07 RX ORDER — METOPROLOL SUCCINATE 100 MG/1
100 TABLET, EXTENDED RELEASE ORAL DAILY
Qty: 90 TABLET | Refills: 1 | Status: SHIPPED | OUTPATIENT
Start: 2017-09-07 | End: 2018-02-07 | Stop reason: SINTOL

## 2017-09-07 NOTE — TELEPHONE ENCOUNTER
[facility] requesting alternate prescription for Atenolol due to back order. Sent to provider to review.

## 2017-09-21 ENCOUNTER — CARE COORDINATION (OUTPATIENT)
Dept: GERIATRIC MEDICINE | Facility: CLINIC | Age: 77
End: 2017-09-21

## 2017-09-21 DIAGNOSIS — Z76.89 HEALTH CARE HOME: ICD-10-CM

## 2017-09-21 DIAGNOSIS — Z71.89 ADVANCED DIRECTIVES, COUNSELING/DISCUSSION: Chronic | ICD-10-CM

## 2017-09-21 NOTE — PROGRESS NOTES
"Home visit/Mikhail Risk Assessment/EW screening completed on:  9/21/2017  Met with client in his home, Ramone Ivory Peter  unable to make the home visit and interpretation completed over the phone.   Client reports that he is doing well, reports his health is good and better compared to a year ago, \"I controlled my diet.\" Client states that he is happy with his recent lab results (improved A1C).  Had discussion that PCP discussed weight and to decrease carbohydrates. Client stated that he is aware that he should decrease his rice intake, states that he has increased fruits and vegetables. Client also stated that he has decreased his meat intake, stating that he has noticed high BS testing after he eats meat, \"I eat more fish.\"  Client cont to exercise 3-4 days/wk at the [facility], stating it varies depending on his right knee pain. Client states that he uses OTC Icy Hot patch, but CM did not observe. Enc'd client to talk with PCP regarding right knee pain and options. Client request CM to assist with scheduling annual eye exam.   Member resides: Apartment not handicap accessible  -full flight of stairs to access apt on second level.   Member currently receiving the following services:  [employer] and informal supports of family with shopping and housekeeping due to knee pain and intermittent dizziness.   See EMR for a list of client's diagnoses and medications.   Medication management:   Medications reviewed:Yes.   Medication management: Independent and sets up on own.   Medication understanding:Patient has understanding of regimen and is adherent Yes. MTM offered.    Member Mood/behavior-PHQ 2 score:  0. Client is alert/oriented, reports that he is satisfied with his life, denies feeling depressed or anxious. Client has support of his catalina and family.   Plan of Care:  Continue with [facility].   Follow-Up Plan: Member informed of future contact, plan to f/u with member with a 6 month telephone assessment.  Contact " information shared with member and family, encouraged member to call with any questions or concerns prior to this.    See [facility] for further detailed information  MMIS completed, Rate Cell B, Case Mix L.  Care plan completed.

## 2017-09-21 NOTE — PROGRESS NOTES
Scheduled appointment for client at [facility], Dr. Dallas Cantu - [address], [place] [address]  11/20/2017 @ 12:45pm.   Called  Ramone to inform her of date and time. She was going to inform Chip of appointment date and time.     Renata Valadez  Case Management Specialist  [facility]  [phone]

## 2017-09-22 ENCOUNTER — CARE COORDINATION (OUTPATIENT)
Dept: GERIATRIC MEDICINE | Facility: CLINIC | Age: 77
End: 2017-09-22

## 2017-09-22 NOTE — PROGRESS NOTES
Received after visit chart from care coordinator.  Completed following tasks: Mailed copy of care plan to client, Emailed CPS to [facility] for [facility], Updated services in access.  Chart was returned to CC.     Renata Valadez  Case Management Specialist  [facility]  [phone]

## 2017-11-19 DIAGNOSIS — L50.9 URTICARIA: ICD-10-CM

## 2017-11-19 DIAGNOSIS — E11.9 TYPE 2 DIABETES MELLITUS WITHOUT COMPLICATION (H): ICD-10-CM

## 2017-11-20 ENCOUNTER — TRANSFERRED RECORDS (OUTPATIENT)
Dept: HEALTH INFORMATION MANAGEMENT | Facility: CLINIC | Age: 77
End: 2017-11-20

## 2017-11-21 DIAGNOSIS — E11.9 TYPE 2 DIABETES, HBA1C GOAL < 7% (H): ICD-10-CM

## 2017-11-21 RX ORDER — CETIRIZINE HYDROCHLORIDE 10 MG/1
TABLET ORAL
Qty: 90 TABLET | Refills: 0 | Status: SHIPPED | OUTPATIENT
Start: 2017-11-21 | End: 2018-01-31

## 2017-11-24 RX ORDER — LANCETS
EACH MISCELLANEOUS
Qty: 200 EACH | Refills: 1 | Status: SHIPPED | OUTPATIENT
Start: 2017-11-24 | End: 2018-09-11

## 2017-11-24 NOTE — TELEPHONE ENCOUNTER
Requested Prescriptions   Pending Prescriptions Disp Refills     MICROLET LANCETS MISC [Pharmacy Med Name: MICROLET COLORED LANCETS 100'S] 200 each 0     Sig: TEST TWICE DAILY    Diabetic Supplies Protocol Passed    11/21/2017  2:05 PM       Passed - Patient is 18 years of age or older       Passed - Patient has had appt within past 6 mos    IV to PO - Antibiotics     None                    Prescription approved per [facility] Refill Protocol.

## 2018-01-02 ENCOUNTER — DOCUMENTATION ONLY (OUTPATIENT)
Dept: LAB | Facility: CLINIC | Age: 78
End: 2018-01-02

## 2018-01-02 DIAGNOSIS — R80.9 TYPE 2 DIABETES MELLITUS WITH MICROALBUMINURIA, WITHOUT LONG-TERM CURRENT USE OF INSULIN (H): Primary | ICD-10-CM

## 2018-01-02 DIAGNOSIS — E11.29 TYPE 2 DIABETES MELLITUS WITH MICROALBUMINURIA, WITHOUT LONG-TERM CURRENT USE OF INSULIN (H): Primary | ICD-10-CM

## 2018-01-02 DIAGNOSIS — E78.5 HYPERLIPIDEMIA LDL GOAL <100: ICD-10-CM

## 2018-01-03 ENCOUNTER — CARE COORDINATION (OUTPATIENT)
Dept: GERIATRIC MEDICINE | Facility: CLINIC | Age: 78
End: 2018-01-03

## 2018-01-03 NOTE — PROGRESS NOTES
Rec'd vm from client requesting a return call to help him schedule an eye appt.  CM attempted to reach client x 2 earlier, left vm  Call placed to client who stated that his daughter assisted him. Client states that he woke up with double vision on Monday. Client states that his daughter scheduled an appt today, but that there were no changes. Client reports that he was instructed to schedule another appt in February for new glasses.  Client thanked CM for calling.  Emerita Pineda RN, BC  Supervisor [facility]   [phone] (Fax)

## 2018-01-30 DIAGNOSIS — E78.5 HYPERLIPIDEMIA LDL GOAL <100: ICD-10-CM

## 2018-01-30 DIAGNOSIS — E11.29 TYPE 2 DIABETES MELLITUS WITH MICROALBUMINURIA, WITHOUT LONG-TERM CURRENT USE OF INSULIN (H): ICD-10-CM

## 2018-01-30 DIAGNOSIS — R80.9 TYPE 2 DIABETES MELLITUS WITH MICROALBUMINURIA, WITHOUT LONG-TERM CURRENT USE OF INSULIN (H): ICD-10-CM

## 2018-01-30 LAB
ALBUMIN SERPL-MCNC: 3.8 G/DL (ref 3.4–5)
ALP SERPL-CCNC: 50 U/L (ref 40–150)
ALT SERPL W P-5'-P-CCNC: 49 U/L (ref 0–70)
ANION GAP SERPL CALCULATED.3IONS-SCNC: 6 MMOL/L (ref 3–14)
AST SERPL W P-5'-P-CCNC: 32 U/L (ref 0–45)
BILIRUB SERPL-MCNC: 0.7 MG/DL (ref 0.2–1.3)
BUN SERPL-MCNC: 12 MG/DL (ref 7–30)
CALCIUM SERPL-MCNC: 9 MG/DL (ref 8.5–10.1)
CHLORIDE SERPL-SCNC: 103 MMOL/L (ref 94–109)
CHOLEST SERPL-MCNC: 143 MG/DL
CO2 SERPL-SCNC: 27 MMOL/L (ref 20–32)
CREAT SERPL-MCNC: 1.04 MG/DL (ref 0.66–1.25)
GFR SERPL CREATININE-BSD FRML MDRD: 69 ML/MIN/1.7M2
GLUCOSE SERPL-MCNC: 148 MG/DL (ref 70–99)
HBA1C MFR BLD: 7.5 % (ref 4.3–6)
HDLC SERPL-MCNC: 49 MG/DL
LDLC SERPL CALC-MCNC: 68 MG/DL
NONHDLC SERPL-MCNC: 94 MG/DL
POTASSIUM SERPL-SCNC: 3.4 MMOL/L (ref 3.4–5.3)
PROT SERPL-MCNC: 8.5 G/DL (ref 6.8–8.8)
SODIUM SERPL-SCNC: 136 MMOL/L (ref 133–144)
TRIGL SERPL-MCNC: 128 MG/DL

## 2018-01-30 PROCEDURE — 80053 COMPREHEN METABOLIC PANEL: CPT | Performed by: INTERNAL MEDICINE

## 2018-01-30 PROCEDURE — 80061 LIPID PANEL: CPT | Performed by: INTERNAL MEDICINE

## 2018-01-30 PROCEDURE — 83036 HEMOGLOBIN GLYCOSYLATED A1C: CPT | Performed by: INTERNAL MEDICINE

## 2018-01-30 PROCEDURE — 36415 COLL VENOUS BLD VENIPUNCTURE: CPT | Performed by: INTERNAL MEDICINE

## 2018-01-31 DIAGNOSIS — E78.5 HYPERLIPIDEMIA LDL GOAL <100: ICD-10-CM

## 2018-01-31 DIAGNOSIS — L50.9 URTICARIA: ICD-10-CM

## 2018-01-31 DIAGNOSIS — E11.21 TYPE 2 DIABETES MELLITUS WITH DIABETIC NEPHROPATHY, WITHOUT LONG-TERM CURRENT USE OF INSULIN (H): ICD-10-CM

## 2018-02-02 RX ORDER — SIMVASTATIN 40 MG
TABLET ORAL
Qty: 30 TABLET | Refills: 0 | Status: SHIPPED | OUTPATIENT
Start: 2018-02-02 | End: 2018-03-22

## 2018-02-02 RX ORDER — CETIRIZINE HYDROCHLORIDE 10 MG/1
TABLET ORAL
Qty: 30 TABLET | Refills: 0 | Status: SHIPPED | OUTPATIENT
Start: 2018-02-02 | End: 2018-03-22

## 2018-02-02 NOTE — TELEPHONE ENCOUNTER
"Requested Prescriptions   Pending Prescriptions Disp Refills     cetirizine (ZYRTEC) 10 MG tablet [Pharmacy Med Name: CETIRIZINE 10MG TABLETS] 90 tablet 0     Sig: TAKE 1 TABLET(10 MG) BY MOUTH DAILY    Antihistamines Protocol Passed    1/31/2018  3:18 PM       Passed - Recent or future visit with authorizing provider's specialty    Patient had office visit in the last year or has a visit in the next 30 days with authorizing provider.  See \"Patient Info\" tab in inbasket, or \"Choose Columns\" in Meds & Orders section of the refill encounter.   Last OV: 09/06/17, per OV note: f/u in 4-6 months   Next 5 appointments (look out 90 days)     Feb 07, 2018 11:15 AM CST   SHORT with Liam Esquivel MD, [facility]   [facility]    [address] [place]  [facility] [address]   [phone]                     Passed - Patient is age 3 or older    Apply age and/or weight-based dosing for peds patients age 3 and older.    Forward request to provider for patients under the age of 3.          metFORMIN (GLUCOPHAGE) 500 MG tablet [Pharmacy Med Name: METFORMIN 500MG TABLETS] 270 tablet 0     Sig: TAKE 2 TABLETS BY MOUTH WITH BREAKFAST AND ONE TABLET WITH SUPPER    Biguanide Agents Passed    1/31/2018  3:18 PM       Passed - Patient's BP is less than 140/90    BP Readings from Last 3 Encounters:   09/06/17 138/84   05/31/17 130/68   01/25/17 134/70          Passed - Patient has documented LDL within the past 12 mos.    Recent Labs   Lab Test  01/30/18   0804   LDL  68          Passed - Patient has had a Microalbumin in the past 12 mos.    Recent Labs   Lab Test  08/30/17   0801   MICROL  168   UMALCR  68.02*   Per 09/06/17 letter: \"The results of your recent urine test showed an elevation in microalbumin, an early indicator of ill-effects to the kidney from diabetes. You are already taking the standard treatment for this situation, which is an ARB medication like " "Losartan\"       Passed - Patient is age 10 or older       Passed - Patient has documented A1c within the specified period of time.    Recent Labs   Lab Test  01/30/18   0804   A1C  7.5*          Passed - Patient's CR is NOT>1.4 OR Patient's EGFR is NOT<45 within past 12 mos.    Recent Labs   Lab Test  01/30/18   0804   GFRESTIMATED  69   GFRESTBLACK  84     Recent Labs   Lab Test  01/30/18   0804   CR  1.04          Passed - Patient does NOT have a diagnosis of CHF.       Passed - Recent (6 mos) or future visit with authorizing provider's specialty    Patient had office visit in the last 6 months or has a visit in the next 30 days with authorizing provider.  See \"Patient Info\" tab in inbasket, or \"Choose Columns\" in Meds & Orders section of the refill encounter.          simvastatin (ZOCOR) 40 MG tablet [Pharmacy Med Name: SIMVASTATIN 40MG TABLETS] 90 tablet 0     Sig: TAKE 1 TABLET(40 MG) BY MOUTH AT BEDTIME    Statins Protocol Passed    1/31/2018  3:18 PM       Passed - LDL on file in past 12 months    Recent Labs   Lab Test  01/30/18   0804   LDL  68          Passed - No abnormal creatine kinase in past 12 months    No lab results found.       Passed - Recent or future visit with authorizing provider    Patient had office visit in the last year or has a visit in the next 30 days with authorizing provider.  See \"Patient Info\" tab in inbasket, or \"Choose Columns\" in Meds & Orders section of the refill encounter.        Passed - Patient is age 18 or older        Medication is being filled for 1 time refill only due to:  upcoming appt    "

## 2018-02-07 ENCOUNTER — OFFICE VISIT (OUTPATIENT)
Dept: INTERNAL MEDICINE | Facility: CLINIC | Age: 78
End: 2018-02-07
Payer: COMMERCIAL

## 2018-02-07 VITALS
OXYGEN SATURATION: 96 % | WEIGHT: 172 LBS | HEIGHT: 60 IN | HEART RATE: 88 BPM | DIASTOLIC BLOOD PRESSURE: 84 MMHG | SYSTOLIC BLOOD PRESSURE: 134 MMHG | TEMPERATURE: 98.2 F | BODY MASS INDEX: 33.77 KG/M2

## 2018-02-07 DIAGNOSIS — E11.21 TYPE 2 DIABETES MELLITUS WITH DIABETIC NEPHROPATHY, WITHOUT LONG-TERM CURRENT USE OF INSULIN (H): Primary | ICD-10-CM

## 2018-02-07 DIAGNOSIS — I10 ESSENTIAL HYPERTENSION: ICD-10-CM

## 2018-02-07 DIAGNOSIS — E78.5 HYPERLIPIDEMIA LDL GOAL <100: ICD-10-CM

## 2018-02-07 PROCEDURE — 99214 OFFICE O/P EST MOD 30 MIN: CPT | Performed by: INTERNAL MEDICINE

## 2018-02-07 RX ORDER — ATENOLOL 50 MG/1
TABLET ORAL
Qty: 90 TABLET | Refills: 3 | Status: SHIPPED | OUTPATIENT
Start: 2018-02-07 | End: 2018-09-11

## 2018-02-07 NOTE — NURSING NOTE
"Chief Complaint   Patient presents with     Hypertension     Diabetes     Lipids       Initial /80 (BP Location: Right arm, Patient Position: Sitting, Cuff Size: Adult Regular)  Pulse 88  Temp 98.2  F (36.8  C) (Oral)  Ht 4' 11\" (1.499 m)  Wt 172 lb (78 kg)  SpO2 96%  BMI 34.74 kg/m2 Estimated body mass index is 34.74 kg/(m^2) as calculated from the following:    Height as of this encounter: 4' 11\" (1.499 m).    Weight as of this encounter: 172 lb (78 kg).  Medication Reconciliation: complete Rubi GUZMAN      "

## 2018-02-07 NOTE — MR AVS SNAPSHOT
"              After Visit Summary   2/7/2018    Chip Shanks    MRN: [id number]           Patient Information     Date Of Birth          1940        Visit Information        Provider Department      2/7/2018 11:15 AM Liam Esquivel MD; [employer] [facility]        Today's Diagnoses     Type 2 diabetes mellitus with diabetic nephropathy, without long-term current use of insulin (H)    -  1    Hyperlipidemia LDL goal <100        Essential hypertension          Care Instructions    Blood pressure, LDL-Cholesterol and diabetes all look fine!    Try to see if [facility] or other pharmacy has Atenolol, since you have had side effects on Metoprolol.     See me in four months, with \"lab only\" appointment a few days before the office appointment.           Follow-ups after your visit        Who to contact     If you have questions or need follow up information about today's clinic visit or your schedule please contact [facility] directly at [phone].  Normal or non-critical lab and imaging results will be communicated to you by MyChart, letter or phone within 4 business days after the clinic has received the results. If you do not hear from us within 7 days, please contact the clinic through [employer] or phone. If you have a critical or abnormal lab result, we will notify you by phone as soon as possible.  Submit refill requests through [employer] or call your pharmacy and they will forward the refill request to us. Please allow 3 business days for your refill to be completed.          Additional Information About Your Visit        [employer] Information     [employer] lets you send messages to your doctor, view your test results, renew your prescriptions, schedule appointments and more. To sign up, go to [email or web address] . Click on \"Log in\" on the left side of the screen, which will take you to the Welcome page. Then click on \"Sign up Now\" on the right side of " "the page.     You will be asked to enter the access code listed below, as well as some personal information. Please follow the directions to create your username and password.     Your access code is: [email or web address]  Expires: 2018 11:49 AM     Your access code will  in 90 days. If you need help or a new code, please call your [facility] or [phone].        Care EveryWhere ID     This is your Care EveryWhere ID. This could be used by other organizations to access your [facility] records  [id number]        Your Vitals Were     Pulse Temperature Height Pulse Oximetry BMI (Body Mass Index)       88 98.2  F (36.8  C) (Oral) 4' 11\" (1.499 m) 96% 34.74 kg/m2        Blood Pressure from Last 3 Encounters:   18 134/84   17 138/84   17 130/68    Weight from Last 3 Encounters:   18 172 lb (78 kg)   17 176 lb 4.8 oz (80 kg)   17 177 lb (80.3 kg)              Today, you had the following     No orders found for display         Today's Medication Changes          These changes are accurate as of 18 11:49 AM.  If you have any questions, ask your nurse or doctor.               Start taking these medicines.        Dose/Directions    atenolol 50 MG tablet   Commonly known as:  TENORMIN   Used for:  Essential hypertension   Started by:  Liam Esquivel MD        TAKE 1 TABLET(50 MG) BY MOUTH DAILY   Quantity:  90 tablet   Refills:  3         Stop taking these medicines if you haven't already. Please contact your care team if you have questions.     metoprolol succinate 100 MG 24 hr tablet   Commonly known as:  TOPROL-XL   Stopped by:  Liam Esquivel MD                Where to get your medicines      Some of these will need a paper prescription and others can be bought over the counter.  Ask your nurse if you have questions.     Bring a paper prescription for each of these medications     atenolol 50 MG tablet                Primary Care Provider Office Phone # Fax #    " Liam Esquivel -[phone]       [address] [facility] [address]  [facility] [address]        Equal Access to Services     KARMA MALLOY : Dick destiny babb ruby Bran, waaimeeda luqnick, qajeffta kaalmada vaibhav, leonor recinosstacey conley. So [facility] [phone].    ATENCIÓN: Si habla español, tiene a mata disposición servicios gratuitos de asistencia lingüística. Llame al [phone].    We comply with applicable federal civil rights laws and [place] laws. We do not discriminate on the basis of race, color, national origin, age, disability, sex, sexual orientation, or gender identity.            Thank you!     Thank you for choosing [facility]  for your care. Our goal is always to provide you with excellent care. Hearing back from our patients is one way we can continue to improve our services. Please take a few minutes to complete the written survey that you may receive in the mail after your visit with us. Thank you!             Your Updated Medication List - Protect others around you: Learn how to safely use, store and throw away your medicines at www.disposemymeds.org.          This list is accurate as of 2/7/18 11:49 AM.  Always use your most recent med list.                   Brand Name Dispense Instructions for use Diagnosis    ACETAMINOPHEN PO      Take 500 mg by mouth every 8 hours as needed for pain (knee pain)        atenolol 50 MG tablet    TENORMIN    90 tablet    TAKE 1 TABLET(50 MG) BY MOUTH DAILY    Essential hypertension       VICTORIANO CONTOUR test strip   Generic drug:  blood glucose monitoring     200 strip    TEST TWICE DAILY    Type 2 diabetes mellitus without complication (H)       blood glucose monitoring meter device kit     1 kit    Use to test blood sugars 2 times daily or as directed.    Type 2 diabetes mellitus with diabetic nephropathy, without long-term current use of insulin (H)       cetirizine 10 MG tablet    zyrTEC    30 tablet    TAKE 1  TABLET(10 MG) BY MOUTH DAILY    Urticaria       losartan 50 MG tablet    COZAAR    90 tablet    Take 1 tablet (50 mg) by mouth daily    Type 2 diabetes mellitus with diabetic nephropathy, without long-term current use of insulin (H), Essential hypertension with goal blood pressure less than 140/90       meclizine 25 MG tablet    ANTIVERT    40 tablet    Take 1 tablet (25 mg) by mouth 3 times daily as needed    Vertigo       metFORMIN 500 MG tablet    GLUCOPHAGE    90 tablet    TAKE 2 TABLETS BY MOUTH WITH BREAKFAST AND ONE TABLET WITH SUPPER    Type 2 diabetes mellitus with diabetic nephropathy, without long-term current use of insulin (H)       MICROLET LANCETS Misc     200 each    TEST TWICE DAILY    Type 2 diabetes, HbA1c goal < 7% (H)       order for DME     1 Units    Equipment being ordered: Cane    Dizziness, Gait instability       order for DME     1 each    Equipment being ordered: Heating pad.    Neck pain       ranitidine 150 MG tablet    ZANTAC    90 tablet    TAKE 1 TABLET(150 MG) BY MOUTH DAILY    Gastroesophageal reflux disease without esophagitis       simvastatin 40 MG tablet    ZOCOR    30 tablet    TAKE 1 TABLET(40 MG) BY MOUTH AT BEDTIME    Hyperlipidemia LDL goal <100

## 2018-02-07 NOTE — PATIENT INSTRUCTIONS
"Blood pressure, LDL-Cholesterol and diabetes all look fine!    Try to see if [employer] or other pharmacy has Atenolol, since you have had side effects on Metoprolol.     See me in four months, with \"lab only\" appointment a few days before the office appointment.   "

## 2018-02-07 NOTE — PROGRESS NOTES
SUBJECTIVE:   Chip Shanks is a 77 year old male who presents to clinic today for the following health issues:  diabetes mellitus, hyperlipidemia, hypertension.     Patient is present with a  today.    Diabetes Follow-up    Patient is checking blood sugars: once daily.  Results are as follows:         am -     Diabetic concerns: sometimes after eating it will reach 200     Symptoms of hypoglycemia (low blood sugar): none     Paresthesias (numbness or burning in feet) or sores: No     Date of last diabetic eye exam: 2/5/2018    Hemoglobin A1C (%)   Date Value   01/30/2018 7.5 (H)   08/30/2017 7.9 (H)     A1c improved. He is currently taking metformin and is not on any insulin.     Hyperlipidemia Follow-Up      Rate your low fat/cholesterol diet?: good    Taking statin?  Yes, no muscle aches from statin    Other lipid medications/supplements?:  None    LDL Cholesterol Calculated (mg/dL)   Date Value   01/30/2018 68   08/30/2017 54     LDL at target. He continues taking simvastatin daily.     Hypertension Follow-up      Outpatient blood pressures are being checked at home.  Results are 120's/80's.    Low Salt Diet: no added salt    Amount of exercise or physical activity: 6-7 days/week for an average of 15-30 minutes    Problems taking medications regularly: No    Medication side effects: dizziness    Diet: low salt and low fat/cholesterol    BP Readings from Last 2 Encounters:   02/07/18 148/80   09/06/17 138/84     Patient no longer wishes to take metoprolol because it was making him feel dizzy and difficult for him to sleep. He wishes to get another refill of atenolol instead. His BP was elevated today. Second BP reading was 134/84.    Past/recent records reviewed and discussed for:  -Notes that he was experiencing double vision and blurry vision. He saw an ophthalmologist on 2/5/2018 and states that his vision is fine now.     Problem list and histories reviewed & adjusted, as  "indicated.  Additional history: as documented    Reviewed and updated as needed this visit by clinical staff  Tobacco  Allergies  Meds  Med Hx  Surg Hx  Fam Hx  Soc Hx      Reviewed and updated as needed this visit by Provider       ROS:  No dyspnea or cough. No chest discomfort, dizziness or palpitations. No diarrhea, abdominal pain or rectal bleeding.   No acute problems with vision or speech, lateralizing weakness or paresthesias.    ROS: as above or negative for Respiratory, CV, GI, endocrine, neuro systems.    This document serves as a record of the services and decisions personally performed and made by Liam Esquivel MD. It was created on his behalf by Becky Gomez, a trained medical scribe. The creation of this document is based on the provider's statements to the medical scribe.  Becky Gomez February 7, 2018 11:37 AM     OBJECTIVE:     /80 (BP Location: Right arm, Patient Position: Sitting, Cuff Size: Adult Regular)  Pulse 88  Temp 98.2  F (36.8  C) (Oral)  Ht 1.499 m (4' 11\")  Wt 78 kg (172 lb)  SpO2 96%  BMI 34.74 kg/m2  Body mass index is 34.74 kg/(m^2).     Repeat BP: 134/84    GENERAL: healthy, alert and no distress  EYES: Eyes grossly normal to inspection, PERRL and conjunctivae and sclerae normal  RESP: lungs clear to auscultation - no rales, rhonchi or wheezes  CV: regular rate and rhythm, normal S1 S2, no S3 or S4, no murmur, click or rub, no peripheral edema and peripheral pulses strong  MS: no gross musculoskeletal defects noted, no edema  SKIN: no suspicious lesions or rashes  NEURO: Normal strength and tone, mentation intact and speech normal  PSYCH: mentation appears normal, affect normal/bright    ASSESSMENT/PLAN:   (E11.21) Type 2 diabetes mellitus with diabetic nephropathy, without long-term current use of insulin (H)  (primary encounter diagnosis)  Comment: A1c at target. Continue current measures.    (E78.5) Hyperlipidemia LDL goal <100  Comment: LDL at target. " "Continue current meds.    (I10) Essential hypertension  Comment: BP at target. Will d/c metoprolol due to side effects. Prescribed a written prescription of atenolol so patient can try multiple pharmacies due to the atenolol shortage  Plan: atenolol (TENORMIN) 50 MG tablet          FUTURE APPOINTMENTS:       - Follow-up visit in 4 months    Patient Instructions   Blood pressure, LDL-Cholesterol and diabetes all look fine!    Try to see if [facility] or other pharmacy has Atenolol, since you have had side effects on Metoprolol.     See me in four months, with \"lab only\" appointment a few days before the office appointment.     The information in this document, created by the medical scribe for me, accurately reflects the services I personally performed and the decisions made by me. I have reviewed and approved this document for accuracy prior to leaving the patient care area.  February 7, 2018 11:37 AM    Liam Esquivel MD  [facility]    "

## 2018-02-07 NOTE — LETTER
"  [facility]  [address] [place] [address]  [phone]    2/7/2018    Chip CLIFFORD Rimma  [address]   [facility] [address]           Dear Nuria Rimma,    The results of your lab tests are enclosed. Everything looks fine. Unless noted otherwise below, any results that are outside the \"normal\" range are within acceptable limits and are of no concern.    Hemoglobin A1C measures control of diabetes. Your Hemoglobin A1C is 7.5. The ideal target is under 7.0, although below 8.0 may be acceptable for some patients.    LDL= Bad Cholesterol-- the target is below 100.     HDL= Good Cholesterol-- although this is determined mostly by heredity, exercise and/or medications may sometimes raise this number.    Triglycerides are another type of fat in the blood, and can sometimes be lowered by reducing intake of sweets or excess carbohydrates, alcohol, and by weight reduction if needed.  Sometimes medications are also used.    AST and ALT are liver tests, as are the bilirubin (total and direct), albumin, total protein, and alkaline phosphatase. Yours are all normal.     Urea Nitrogen and Creatinine are kidney tests--yours are normal. GFR stands for Glomerular Filtration Rate, a more complicated estimate of kidney function.    Sodium, Potassium, Chloride, Carbon Dioxide, and Calcium are all normal salts in the bloodstream. Yours all look normal. Your glucose (blood sugar) also looks fine. (You can ignore the anion gap result).     If you have any further questions or problems, please contact our office.    Sincerely,      Liam Esquivel MD  Attachment: Lab results     "

## 2018-03-22 DIAGNOSIS — E78.5 HYPERLIPIDEMIA LDL GOAL <100: ICD-10-CM

## 2018-03-22 DIAGNOSIS — L50.9 URTICARIA: ICD-10-CM

## 2018-03-22 DIAGNOSIS — E11.21 TYPE 2 DIABETES MELLITUS WITH DIABETIC NEPHROPATHY, WITHOUT LONG-TERM CURRENT USE OF INSULIN (H): ICD-10-CM

## 2018-03-22 DIAGNOSIS — I10 ESSENTIAL HYPERTENSION WITH GOAL BLOOD PRESSURE LESS THAN 140/90: ICD-10-CM

## 2018-03-26 RX ORDER — SIMVASTATIN 40 MG
TABLET ORAL
Qty: 90 TABLET | Refills: 0 | Status: SHIPPED | OUTPATIENT
Start: 2018-03-26 | End: 2018-06-08

## 2018-03-26 RX ORDER — LOSARTAN POTASSIUM 50 MG/1
TABLET ORAL
Qty: 90 TABLET | Refills: 0 | Status: SHIPPED | OUTPATIENT
Start: 2018-03-26 | End: 2018-06-08

## 2018-03-26 NOTE — TELEPHONE ENCOUNTER
"Requested Prescriptions   Pending Prescriptions Disp Refills     losartan (COZAAR) 50 MG tablet [Pharmacy Med Name: LOSARTAN 50MG TABLETS] 90 tablet 0     Sig: TAKE 1 TABLET(50 MG) BY MOUTH DAILY    Angiotensin-II Receptors Passed    3/22/2018 12:45 PM       Passed - Blood pressure under 140/90 in past 12 months    BP Readings from Last 3 Encounters:   02/07/18 134/84   09/06/17 138/84   05/31/17 130/68                Passed - Recent (12 mo) or future (30 days) visit within the authorizing provider's specialty    Patient had office visit in the last 12 months or has a visit in the next 30 days with authorizing provider or within the authorizing provider's specialty.  See \"Patient Info\" tab in inbasket, or \"Choose Columns\" in Meds & Orders section of the refill encounter.           Passed - Patient is age 18 or older       Passed - Normal serum creatinine on file in past 12 months    Recent Labs   Lab Test  01/30/18   0804   CR  1.04            Passed - Normal serum potassium on file in past 12 months    Recent Labs   Lab Test  01/30/18   0804   POTASSIUM  3.4                    simvastatin (ZOCOR) 40 MG tablet [Pharmacy Med Name: SIMVASTATIN 40MG TABLETS] 30 tablet 0     Sig: TAKE 1 TABLET(40 MG) BY MOUTH AT BEDTIME    Statins Protocol Passed    3/22/2018 12:45 PM       Passed - LDL on file in past 12 months    Recent Labs   Lab Test  01/30/18   0804   LDL  68            Passed - No abnormal creatine kinase in past 12 months    No lab results found.            Passed - Recent (12 mo) or future (30 days) visit within the authorizing provider's specialty    Patient had office visit in the last 12 months or has a visit in the next 30 days with authorizing provider or within the authorizing provider's specialty.  See \"Patient Info\" tab in inbasket, or \"Choose Columns\" in Meds & Orders section of the refill encounter.           Passed - Patient is age 18 or older        cetirizine (ZYRTEC) 10 MG tablet [Pharmacy Med " "Name: CETIRIZINE 10MG TABLETS] 30 tablet 0     Sig: TAKE 1 TABLET BY MOUTH EVERY DAY    Antihistamines Protocol Failed    3/22/2018 12:45 PM       Failed - Patient is 3-64 years of age    Apply weight-based dosing for peds patients age 3 - 12 years of age.    Forward request to provider for patients under the age of 3 or over the age of 64.         Passed - Recent (12 mo) or future (30 days) visit within the authorizing provider's specialty    Patient had office visit in the last 12 months or has a visit in the next 30 days with authorizing provider or within the authorizing provider's specialty.  See \"Patient Info\" tab in inbasket, or \"Choose Columns\" in Meds & Orders section of the refill encounter.              Losartan and Zocor medications is being filled for 1 time refill only due to:  Patient needs to be seen because per last OV dictation, pt is due for f/u OV in June 2018.     Routing RUST refill request to provider for review/approval because:  Protocol failed--d/t age.    Please advise, thanks.          "

## 2018-03-27 RX ORDER — CETIRIZINE HYDROCHLORIDE 10 MG/1
TABLET ORAL
Qty: 30 TABLET | Refills: 0 | Status: SHIPPED | OUTPATIENT
Start: 2018-03-27 | End: 2018-05-09

## 2018-04-03 ENCOUNTER — PATIENT OUTREACH (OUTPATIENT)
Dept: GERIATRIC MEDICINE | Facility: CLINIC | Age: 78
End: 2018-04-03

## 2018-04-03 NOTE — PROGRESS NOTES
"[facility] Care Coordination Contact    [facility] Six-Month Telephone Assessment    6 month telephone assessment completed on 4/3/18. Rec'd tele call from client, connected with an  through [employer].  EPIC notes reviewed. Client reports that he is doing well, has no concerns or questions for CM.     ER visits: No  Hospitalizations: No  TCU stays: No  Significant health status changes: none reported, reviewed recent PCC appt, change in medication to Atenolol.   Falls/Injuries: No  ADL/IADL changes: No  Changes in services: No    Caregiver Assessment follow up:  n/a    Goals: See POC in chart for goal progress documentation.    Chip has received flu vaccine, reports knee pain is still present, \"off and on, but improved.\"   Chip weight has decreased by 3#, cont to exercise as tolerated.    Will see member in 6 months for an annual health risk assessment.   Encouraged member to call CC with any questions or concerns in the meantime.   Emerita Pineda RN, BC  Supervisor [facility]   [phone] (Fax)            "

## 2018-04-03 NOTE — PROGRESS NOTES
[facility] Care Coordination Contact  Communication History     User: Alejandra Pineda RN Date/time: 4/3/2018  3:26 PM    Comment: Left vm with client through , [employer] reqeusting a return call.  6 month telephone assessment     Context:  Outcome: Left Message    Phone number: [phone] Phone Type: Home Phone    Comm. type: Telephone Call type: Outgoing    Contact: Chip Shanks Relation to patient: Self        Emerita Pineda RN, BC  Supervisor [facility]   [phone] (Fax)

## 2018-04-18 ENCOUNTER — PATIENT OUTREACH (OUTPATIENT)
Dept: GERIATRIC MEDICINE | Facility: CLINIC | Age: 78
End: 2018-04-18

## 2018-04-18 DIAGNOSIS — Z76.89 HEALTH CARE HOME: ICD-10-CM

## 2018-04-18 NOTE — PROGRESS NOTES
[facility] Care Coordination Contact  Rec'd tele call from client, offered , client agreed (conference call placed to  through [employer]).  Client inquired if he could obtain a [employer] card because he would like to go to the mall to walk/shop.  Explained that CM can order a monthly pass  Follow up call to client with , per CM notes client continues to drive. Client states that he does drive, but only short distances around his home and no longer drives on the freeway or long distances -hx of dizziness.   CM will place referral for [employer] card.  Emerita Pineda RN, BC  Supervisor [facility]   [phone] (Fax)

## 2018-04-30 ENCOUNTER — DOCUMENTATION ONLY (OUTPATIENT)
Dept: LAB | Facility: CLINIC | Age: 78
End: 2018-04-30

## 2018-04-30 DIAGNOSIS — E78.5 HYPERLIPIDEMIA LDL GOAL <100: ICD-10-CM

## 2018-04-30 DIAGNOSIS — E11.21 TYPE 2 DIABETES MELLITUS WITH DIABETIC NEPHROPATHY, WITHOUT LONG-TERM CURRENT USE OF INSULIN (H): Primary | ICD-10-CM

## 2018-05-08 DIAGNOSIS — E11.21 TYPE 2 DIABETES MELLITUS WITH DIABETIC NEPHROPATHY, WITHOUT LONG-TERM CURRENT USE OF INSULIN (H): ICD-10-CM

## 2018-05-09 DIAGNOSIS — K21.9 GASTROESOPHAGEAL REFLUX DISEASE WITHOUT ESOPHAGITIS: ICD-10-CM

## 2018-05-09 DIAGNOSIS — L50.9 URTICARIA: ICD-10-CM

## 2018-05-10 NOTE — TELEPHONE ENCOUNTER
"Requested Prescriptions   Pending Prescriptions Disp Refills     metFORMIN (GLUCOPHAGE) 500 MG tablet [Pharmacy Med Name: METFORMIN 500MG TABLETS] 90 tablet 0     Sig: TAKE 2 TABLETS BY MOUTH WITH BREAKFAST AND 1 TABLET BY MOUTH WITH SUPPER    Biguanide Agents Passed    5/8/2018  3:38 PM       Passed - Blood pressure less than 140/90 in past 6 months    BP Readings from Last 3 Encounters:   02/07/18 134/84   09/06/17 138/84   05/31/17 130/68                Passed - Patient has documented LDL within the past 12 mos.    Recent Labs   Lab Test  01/30/18   0804   LDL  68            Passed - Patient has had a Microalbumin in the past 12 mos.    Recent Labs   Lab Test  08/30/17   0801   MICROL  168   UMALCR  68.02*            Passed - Patient is age 10 or older       Passed - Patient has documented A1c within the specified period of time.    If HgbA1C is 8 or greater, it needs to be on file within the past 3 months.  If less than 8, must be on file within the past 6 months.     Recent Labs   Lab Test  01/30/18   0804   A1C  7.5*            Passed - Patient's CR is NOT>1.4 OR Patient's EGFR is NOT<45 within past 12 mos.    Recent Labs   Lab Test  01/30/18   0804   GFRESTIMATED  69   GFRESTBLACK  84       Recent Labs   Lab Test  01/30/18   0804   CR  1.04            Passed - Patient does NOT have a diagnosis of CHF.       Passed - Recent (6 mo) or future (30 days) visit within the authorizing provider's specialty    Patient had office visit in the last 6 months or has a visit in the next 30 days with authorizing provider or within the authorizing provider's specialty.  See \"Patient Info\" tab in inbasket, or \"Choose Columns\" in Meds & Orders section of the refill encounter.            Last office visit 2/7/18.  Prescription approved per [facility] Refill Protocol.  Luna Navarro RN    "

## 2018-05-13 RX ORDER — CETIRIZINE HYDROCHLORIDE 10 MG/1
TABLET ORAL
Qty: 90 TABLET | Refills: 0 | Status: SHIPPED | OUTPATIENT
Start: 2018-05-13 | End: 2018-09-27

## 2018-05-13 NOTE — TELEPHONE ENCOUNTER
"      Requested Prescriptions   Pending Prescriptions Disp Refills     cetirizine (ZYRTEC) 10 MG tablet [Pharmacy Med Name: CETIRIZINE 10MG TABLETS] 30 tablet 0     Sig: TAKE 1 TABLET BY MOUTH EVERY DAY    Antihistamines Protocol Failed    5/9/2018 12:41 PM       Failed - Patient is 3-64 years of age    Apply weight-based dosing for peds patients age 3 - 12 years of age.    Forward request to provider for patients under the age of 3 or over the age of 64.         Passed - Recent (12 mo) or future (30 days) visit within the authorizing provider's specialty    Patient had office visit in the last 12 months or has a visit in the next 30 days with authorizing provider or within the authorizing provider's specialty.  See \"Patient Info\" tab in inbasket, or \"Choose Columns\" in Meds & Orders section of the refill encounter.            ranitidine (ZANTAC) 150 MG tablet [Pharmacy Med Name: RANITIDINE 150MG TABLETS] 90 tablet 0     Sig: TAKE 1 TABLET(150 MG) BY MOUTH DAILY    H2 Blockers Protocol Passed    5/9/2018 12:41 PM       Passed - Patient is age 12 or older       Passed - Recent (12 mo) or future (30 days) visit within the authorizing provider's specialty    Patient had office visit in the last 12 months or has a visit in the next 30 days with authorizing provider or within the authorizing provider's specialty.  See \"Patient Info\" tab in inbasket, or \"Choose Columns\" in Meds & Orders section of the refill encounter.              "

## 2018-06-01 DIAGNOSIS — E78.5 HYPERLIPIDEMIA LDL GOAL <100: ICD-10-CM

## 2018-06-01 DIAGNOSIS — E11.21 TYPE 2 DIABETES MELLITUS WITH DIABETIC NEPHROPATHY, WITHOUT LONG-TERM CURRENT USE OF INSULIN (H): ICD-10-CM

## 2018-06-01 LAB
ALBUMIN SERPL-MCNC: 3.6 G/DL (ref 3.4–5)
ALP SERPL-CCNC: 52 U/L (ref 40–150)
ALT SERPL W P-5'-P-CCNC: 54 U/L (ref 0–70)
ANION GAP SERPL CALCULATED.3IONS-SCNC: 10 MMOL/L (ref 3–14)
AST SERPL W P-5'-P-CCNC: 42 U/L (ref 0–45)
BILIRUB SERPL-MCNC: 0.7 MG/DL (ref 0.2–1.3)
BUN SERPL-MCNC: 12 MG/DL (ref 7–30)
CALCIUM SERPL-MCNC: 8.7 MG/DL (ref 8.5–10.1)
CHLORIDE SERPL-SCNC: 105 MMOL/L (ref 94–109)
CHOLEST SERPL-MCNC: 126 MG/DL
CO2 SERPL-SCNC: 24 MMOL/L (ref 20–32)
CREAT SERPL-MCNC: 0.91 MG/DL (ref 0.66–1.25)
CREAT UR-MCNC: 142 MG/DL
GFR SERPL CREATININE-BSD FRML MDRD: 81 ML/MIN/1.7M2
GLUCOSE SERPL-MCNC: 145 MG/DL (ref 70–99)
HBA1C MFR BLD: 8.4 % (ref 0–5.6)
HDLC SERPL-MCNC: 39 MG/DL
LDLC SERPL CALC-MCNC: 63 MG/DL
MICROALBUMIN UR-MCNC: 116 MG/L
MICROALBUMIN/CREAT UR: 81.69 MG/G CR (ref 0–17)
NONHDLC SERPL-MCNC: 87 MG/DL
POTASSIUM SERPL-SCNC: 3.4 MMOL/L (ref 3.4–5.3)
PROT SERPL-MCNC: 8.3 G/DL (ref 6.8–8.8)
SODIUM SERPL-SCNC: 139 MMOL/L (ref 133–144)
TRIGL SERPL-MCNC: 122 MG/DL
TSH SERPL DL<=0.005 MIU/L-ACNC: 0.96 MU/L (ref 0.4–4)

## 2018-06-01 PROCEDURE — 80053 COMPREHEN METABOLIC PANEL: CPT | Performed by: INTERNAL MEDICINE

## 2018-06-01 PROCEDURE — 80061 LIPID PANEL: CPT | Performed by: INTERNAL MEDICINE

## 2018-06-01 PROCEDURE — 36415 COLL VENOUS BLD VENIPUNCTURE: CPT | Performed by: INTERNAL MEDICINE

## 2018-06-01 PROCEDURE — 82043 UR ALBUMIN QUANTITATIVE: CPT | Performed by: INTERNAL MEDICINE

## 2018-06-01 PROCEDURE — 84443 ASSAY THYROID STIM HORMONE: CPT | Performed by: INTERNAL MEDICINE

## 2018-06-01 PROCEDURE — 83036 HEMOGLOBIN GLYCOSYLATED A1C: CPT | Performed by: INTERNAL MEDICINE

## 2018-06-08 ENCOUNTER — OFFICE VISIT (OUTPATIENT)
Dept: INTERNAL MEDICINE | Facility: CLINIC | Age: 78
End: 2018-06-08
Payer: COMMERCIAL

## 2018-06-08 VITALS
BODY MASS INDEX: 33.57 KG/M2 | OXYGEN SATURATION: 99 % | HEIGHT: 60 IN | SYSTOLIC BLOOD PRESSURE: 134 MMHG | HEART RATE: 77 BPM | TEMPERATURE: 98.3 F | RESPIRATION RATE: 14 BRPM | DIASTOLIC BLOOD PRESSURE: 78 MMHG | WEIGHT: 171 LBS

## 2018-06-08 DIAGNOSIS — I10 ESSENTIAL HYPERTENSION WITH GOAL BLOOD PRESSURE LESS THAN 140/90: ICD-10-CM

## 2018-06-08 DIAGNOSIS — E11.21 TYPE 2 DIABETES MELLITUS WITH DIABETIC NEPHROPATHY, WITHOUT LONG-TERM CURRENT USE OF INSULIN (H): ICD-10-CM

## 2018-06-08 DIAGNOSIS — E78.5 HYPERLIPIDEMIA LDL GOAL <100: ICD-10-CM

## 2018-06-08 DIAGNOSIS — K21.9 GASTROESOPHAGEAL REFLUX DISEASE WITHOUT ESOPHAGITIS: ICD-10-CM

## 2018-06-08 PROCEDURE — 99214 OFFICE O/P EST MOD 30 MIN: CPT | Performed by: INTERNAL MEDICINE

## 2018-06-08 RX ORDER — SIMVASTATIN 40 MG
TABLET ORAL
Qty: 90 TABLET | Refills: 3 | Status: SHIPPED | OUTPATIENT
Start: 2018-06-08 | End: 2018-09-11

## 2018-06-08 RX ORDER — LOSARTAN POTASSIUM 50 MG/1
TABLET ORAL
Qty: 90 TABLET | Refills: 3 | Status: SHIPPED | OUTPATIENT
Start: 2018-06-08 | End: 2018-09-11

## 2018-06-08 NOTE — MR AVS SNAPSHOT
"              After Visit Summary   6/8/2018    Chip Shanks    MRN: [id number]           Patient Information     Date Of Birth          1940        Visit Information        Provider Department      6/8/2018 8:45 AM Liam Esquivel MD; [employer] [facility]        Today's Diagnoses     Type 2 diabetes mellitus with diabetic nephropathy, without long-term current use of insulin (H)        Essential hypertension with goal blood pressure less than 140/90        Hyperlipidemia LDL goal <100        Gastroesophageal reflux disease without esophagitis          Care Instructions    No medication changes made today.     Work on reducing your food portions of carbohydrates like bread, rice, pasta and potatoes, also sweets.   Keep up with your exercise.     See me back in three months, with a \"lab only\" appointment a few days before your office appointment.           Follow-ups after your visit        Who to contact     If you have questions or need follow up information about today's clinic visit or your schedule please contact [facility] directly at [phone].  Normal or non-critical lab and imaging results will be communicated to you by MyChart, letter or phone within 4 business days after the clinic has received the results. If you do not hear from us within 7 days, please contact the clinic through Re2youhart or phone. If you have a critical or abnormal lab result, we will notify you by phone as soon as possible.  Submit refill requests through Sprig or call your pharmacy and they will forward the refill request to us. Please allow 3 business days for your refill to be completed.          Additional Information About Your Visit        Care EveryWhere ID     This is your Care EveryWhere ID. This could be used by other organizations to access your [facility] medical records  [id number]        Your Vitals Were     Pulse Temperature Respirations Height Pulse " "Oximetry BMI (Body Mass Index)    77 98.3  F (36.8  C) (Oral) 14 4' 11\" (1.499 m) 99% 34.54 kg/m2       Blood Pressure from Last 3 Encounters:   06/08/18 134/78   02/07/18 134/84   09/06/17 138/84    Weight from Last 3 Encounters:   06/08/18 171 lb (77.6 kg)   02/07/18 172 lb (78 kg)   09/06/17 176 lb 4.8 oz (80 kg)              We Performed the Following     PAF COMPLETED          Today's Medication Changes          These changes are accurate as of 6/8/18  9:23 AM.  If you have any questions, ask your nurse or doctor.               These medicines have changed or have updated prescriptions.        Dose/Directions    blood glucose monitoring test strip   Commonly known as:  VICTORIANO CONTOUR   This may have changed:  See the new instructions.   Changed by:  Liam Esquivel MD        Use to test blood sugar one time daily or as directed.   Quantity:  200 strip   Refills:  0       ranitidine 150 MG tablet   Commonly known as:  ZANTAC   This may have changed:  See the new instructions.   Used for:  Gastroesophageal reflux disease without esophagitis   Changed by:  Liam Esquivel MD        TAKE 1 TABLET(150 MG) BY MOUTH DAILY FOR HEARTBURN   Quantity:  90 tablet   Refills:  3            Where to get your medicines      These medications were sent to [facility] [address] [place] [address] [address] [facility] [address] AT [facility] [address]  [address], [facility] [address]     Phone:  [phone]     blood glucose monitoring test strip    losartan 50 MG tablet    metFORMIN 500 MG tablet    ranitidine 150 MG tablet    simvastatin 40 MG tablet                Primary Care Provider Office Phone # Fax #    Liam Esquivel [phone]       [address]  [facility] [address]        Equal Access to Services     KARMA MALLOY AH: Dick Bran, waaxda luqnick, qaybta kaalmada vaibhav, leonor conley. So [facility] [phone].    ATENCIÓN: Si habla " español, tiene a mata disposición servicios gratuitos de asistencia lingüística. Baltazar cutler [phone].    We comply with applicable federal civil rights laws and [place] laws. We do not discriminate on the basis of race, color, national origin, age, disability, sex, sexual orientation, or gender identity.            Thank you!     Thank you for choosing [facility]  for your care. Our goal is always to provide you with excellent care. Hearing back from our patients is one way we can continue to improve our services. Please take a few minutes to complete the written survey that you may receive in the mail after your visit with us. Thank you!             Your Updated Medication List - Protect others around you: Learn how to safely use, store and throw away your medicines at www.disposemymeds.org.          This list is accurate as of 6/8/18  9:23 AM.  Always use your most recent med list.                   Brand Name Dispense Instructions for use Diagnosis    ACETAMINOPHEN PO      Take 500 mg by mouth every 8 hours as needed for pain (knee pain)        atenolol 50 MG tablet    TENORMIN    90 tablet    TAKE 1 TABLET(50 MG) BY MOUTH DAILY    Essential hypertension       blood glucose monitoring meter device kit     1 kit    Use to test blood sugars 2 times daily or as directed.    Type 2 diabetes mellitus with diabetic nephropathy, without long-term current use of insulin (H)       blood glucose monitoring test strip    VICTORIANO CONTOUR    200 strip    Use to test blood sugar one time daily or as directed.        cetirizine 10 MG tablet    zyrTEC    90 tablet    TAKE 1 TABLET BY MOUTH EVERY DAY    Urticaria       losartan 50 MG tablet    COZAAR    90 tablet    TAKE 1 TABLET(50 MG) BY MOUTH DAILY    Type 2 diabetes mellitus with diabetic nephropathy, without long-term current use of insulin (H), Essential hypertension with goal blood pressure less than 140/90       meclizine 25 MG tablet    ANTIVERT    40  tablet    Take 1 tablet (25 mg) by mouth 3 times daily as needed    Vertigo       metFORMIN 500 MG tablet    GLUCOPHAGE    270 tablet    TAKE 2 TABLETS BY MOUTH WITH BREAKFAST AND 1 TABLET BY MOUTH WITH SUPPER    Type 2 diabetes mellitus with diabetic nephropathy, without long-term current use of insulin (H)       MICROLET LANCETS Misc     200 each    TEST TWICE DAILY    Type 2 diabetes, HbA1c goal < 7% (H)       order for DME     1 Units    Equipment being ordered: Cane    Dizziness, Gait instability       order for DME     1 each    Equipment being ordered: Heating pad.    Neck pain       ranitidine 150 MG tablet    ZANTAC    90 tablet    TAKE 1 TABLET(150 MG) BY MOUTH DAILY FOR HEARTBURN    Gastroesophageal reflux disease without esophagitis       simvastatin 40 MG tablet    ZOCOR    90 tablet    TAKE 1 TABLET(40 MG) BY MOUTH AT BEDTIME    Hyperlipidemia LDL goal <100

## 2018-06-08 NOTE — PROGRESS NOTES
SUBJECTIVE:   Chip Shanks is a 78 year old male who presents to clinic today for the following health issues:  diabetes mellitus, hyperlipidemia, hypertension, heartburn.    Diabetes Follow-up    Patient is checking blood sugars: once daily.  Results are as follows:         am - 127    Diabetic concerns: None     Symptoms of hypoglycemia (low blood sugar): none     Paresthesias (numbness or burning in feet) or sores: No     Date of last diabetic eye exam: 2/5/2018    Hemoglobin A1C (%)   Date Value   06/01/2018 8.4 (H)   01/30/2018 7.5 (H)     A1c increased and is now above target. Currently on two 500 mg tablets of metformin with breakfast and one tablet with dinner--no med changes since last clinic visit. Patient was on 2,000 mg daily of metformin at one point, but dosage was decreased to 1,000 mg due to abdominal cramps and diarrhea. Symptoms cleared and improved with decreased dosage of metformin.     Patient admits that he is not exercising as much as he used to. He denies eating excessive carbs. Notes that his blood glucose levels may go down to 70 mg/dL after exercising. This then increases again after eating. He checks blood glucose levels once a day and notes that this morning they were 127 mg/dL.     Hyperlipidemia Follow-Up      Rate your low fat/cholesterol diet?: good    Taking statin?  Yes, no muscle aches from statin    Other lipid medications/supplements?:  None    LDL Cholesterol Calculated (mg/dL)   Date Value   06/01/2018 63   01/30/2018 68     LDL controlled with simvastatin 40 mg daily.     Hypertension Follow-up      Outpatient blood pressures are being checked at home.  Results are 110-131/60-80's.    Low Salt Diet: no added salt    Amount of exercise or physical activity: 6-7 days/week for an average of 15-30 minutes    Problems taking medications regularly: No    Medication side effects: none    Diet: low salt and low fat/cholesterol    BP Readings from Last 2 Encounters:   06/08/18  "134/78   02/07/18 134/84     BP satisfactory controlled with losartan 50 mg and atenolol 50 mg daily.     Dizziness  No longer taking meclizine.     GERD  Symptoms controlled with ranitidine prn.     Problem list and histories reviewed & adjusted, as indicated.  Additional history: as documented    Reviewed and updated as needed this visit by clinical staff  Tobacco  Allergies  Meds  Med Hx  Surg Hx  Fam Hx  Soc Hx      Reviewed and updated as needed this visit by Provider       ROS:  No dyspnea or cough. No chest discomfort, dizziness or palpitations. No diarrhea, abdominal pain or rectal bleeding.   No acute problems with vision or speech, lateralizing weakness or paresthesias.    ROS: as above or negative for Respiratory, CV, GI, endocrine, neuro systems.    This document serves as a record of the services and decisions personally performed and made by Liam Esquivel MD. It was created on his behalf by Becky Gomez, a trained medical scribe. The creation of this document is based on the provider's statements to the medical scribe.  Becky Gomez June 8, 2018 9:05 AM     OBJECTIVE:     /78 (BP Location: Right arm, Patient Position: Sitting, Cuff Size: Adult Large)  Pulse 77  Temp 98.3  F (36.8  C) (Oral)  Resp 14  Ht 1.499 m (4' 11\")  Wt 77.6 kg (171 lb)  SpO2 99%  BMI 34.54 kg/m2  Body mass index is 34.54 kg/(m^2).     GENERAL: healthy, alert and no distress  RESP: lungs clear to auscultation - no rales, rhonchi or wheezes  CV: regular rate and rhythm, normal S1 S2, no S3 or S4, no murmur, click or rub, no peripheral edema and peripheral pulses strong  MS: no gross musculoskeletal defects noted, no edema  SKIN: no suspicious lesions or rashes  NEURO: Normal strength and tone, mentation intact and speech normal  PSYCH: mentation appears normal, affect normal/bright    ASSESSMENT/PLAN:   (E11.21) Type 2 diabetes mellitus with diabetic nephropathy, without long-term current use of insulin " "(H)  Comment: A1c increased and is now above target range. Patient does not wish to increase metformin dosage due to side effects (diarrhea). He agrees to instead work on improving diet choices (limiting carb consumption), food portion sizes, and exercise regime. Continue with metformin 500 mg TIB.   Plan: losartan (COZAAR) 50 MG tablet, metFORMIN         (GLUCOPHAGE) 500 MG tablet, **A1C FUTURE 3mo          (I10) Essential hypertension with goal blood pressure less than 140/90  Comment: BP at target. Refilled rx, continue current meds.  Plan: losartan (COZAAR) 50 MG tablet          (E78.5) Hyperlipidemia LDL goal <100  Comment: LDL at target. Refilled rx, continue current meds.  Plan: simvastatin (ZOCOR) 40 MG tablet          (K21.9) Gastroesophageal reflux disease without esophagitis  Comment: Controlled with ranitidine prn. Refilled rx  Plan: ranitidine (ZANTAC) 150 MG tablet          FUTURE APPOINTMENTS:       - Follow-up visit in 3 months    Patient Instructions   No medication changes made today.     Work on reducing your food portions of carbohydrates like bread, rice, pasta and potatoes, also sweets.   Keep up with your exercise.     See me back in three months, with a \"lab only\" appointment a few days before your office appointment.       The information in this document, created by the medical scribe for me, accurately reflects the services I personally performed and the decisions made by me. I have reviewed and approved this document for accuracy prior to leaving the patient care area.  June 8, 2018 9:05 AM    Liam Esquivel MD  [facility]    "

## 2018-06-08 NOTE — LETTER
"  [facility]  [address] [place] [address]  [phone]    6/8/2018    Chip CLIFFORD Rimma  [address]   [facility] [address]           Dear Mr. Shanks,    The results of your lab tests are enclosed. Everything looks fine. Unless noted otherwise below, any results that are outside the \"normal\" range are within acceptable limits and are of no concern.    Hemoglobin A1C measures control of diabetes. Your Hemoglobin A1C is 8.4. The ideal target is under 7.0, although below 8.0 may be acceptable for some patients.    LDL= Bad Cholesterol-- the target is below 100.     HDL= Good Cholesterol-- although this is determined mostly by heredity, exercise and/or medications may sometimes raise this number.    Triglycerides are another type of fat in the blood, and can sometimes be lowered by reducing intake of sweets or excess carbohydrates, alcohol, and by weight reduction if needed.  Sometimes medications are also used.    AST and ALT are liver tests, as are the bilirubin (total and direct), albumin, total protein, and alkaline phosphatase. Yours are all normal.     Urea Nitrogen and Creatinine are kidney tests--yours are normal. GFR stands for Glomerular Filtration Rate, a more complicated estimate of kidney function.    Sodium, Potassium, Chloride, Carbon Dioxide, and Calcium are all normal salts in the bloodstream. Yours all look normal. Your glucose (blood sugar) also looks fine. (You can ignore the anion gap result).     TSH measures thyroid function. Yours is normal.    The results of your recent urine test showed an elevation in microalbumin, an early indicator of ill-effects to the kidney from diabetes. You are already taking the standard treatment for this situation, which is an ARB medication like Losartan.           If you have any further questions or problems, please contact our office.    Sincerely,        Liam Esquivel MD  Attachment: Lab results      "

## 2018-06-08 NOTE — PATIENT INSTRUCTIONS
"No medication changes made today.     Work on reducing your food portions of carbohydrates like bread, rice, pasta and potatoes, also sweets.   Keep up with your exercise.     See me back in three months, with a \"lab only\" appointment a few days before your office appointment.   "

## 2018-06-15 ENCOUNTER — TELEPHONE (OUTPATIENT)
Dept: INTERNAL MEDICINE | Facility: CLINIC | Age: 78
End: 2018-06-15

## 2018-06-15 DIAGNOSIS — E11.21 TYPE 2 DIABETES MELLITUS WITH DIABETIC NEPHROPATHY, WITHOUT LONG-TERM CURRENT USE OF INSULIN (H): Primary | ICD-10-CM

## 2018-06-15 NOTE — TELEPHONE ENCOUNTER
Prior Authorization Specialty Medication Request    Medication/Dose: Contour Test Strips 100's  ICD code (if different than what is on RX):  Unknown  Previously Tried and Failed:  Unknown    Important Lab Values: Unknown  Rationale: Unknown    Insurance Name: [facility]  Insurance ID: [id number]  Insurance Phone Number: [phone]    Pharmacy Information (if different than what is on RX)  Name:  [facility] [place]  Phone:  [phone]

## 2018-06-15 NOTE — TELEPHONE ENCOUNTER
PA Initiation    Medication: VICTORIANO CONTOUR TEST STRIPS  Insurance Company: [employer] - Phone [phone] Fax [phone]  Pharmacy Filling the Rx: [facility] [address] [place] [address] [address] AT [facility] [address]  Filling Pharmacy Phone: [phone]  Filling Pharmacy Fax:    Start Date: 6/15/2018

## 2018-06-21 ENCOUNTER — PATIENT OUTREACH (OUTPATIENT)
Dept: GERIATRIC MEDICINE | Facility: CLINIC | Age: 78
End: 2018-06-21

## 2018-06-22 RX ORDER — BLOOD-GLUCOSE METER
EACH MISCELLANEOUS
Qty: 1 KIT | Refills: 0 | Status: SHIPPED | OUTPATIENT
Start: 2018-06-22 | End: 2019-03-13

## 2018-06-22 NOTE — TELEPHONE ENCOUNTER
New prescriptions sent to [facility] for One Touch Ultra meter, test strips and lancets.

## 2018-06-22 NOTE — PROGRESS NOTES
[facility] Care Coordination Contact  6/21/18 Attempted to reach client twice to f/u on notification from Balbir Chu that Health Care Programs will end 6/30/18, did not receive 12 month renewal.  6/21/18 Rec'd vm from client that he rec'd a letter that his MA will close. Client states that he never rec'd any information in the mail to complete  6/22/18 Left vm with Alok Chu financial worker requesting a return call re above info.   6/22/2018 Call placed to client who confirmed that he did not receive the application to complete. Explained that CM left a vm with his worker, if needed this CM can assist client with completing paperwork before the end of this month.   CM to f/u with client 6/25/2018  Emerita Pineda RN, BC  Supervisor [facility]   [phone] (Fax)

## 2018-06-26 NOTE — PROGRESS NOTES
[facility] Care Coordination Contact  Rec'd vm from Alok at [facility] to report that client did turn in MA paperwork, initially incomplete due to not to having an eligible bank statement. Per Alok he has rec'd a new bank statement. Alok reports that he has processed his MA renewal.   Rec'd vm from client stating that he has not rec'd a new [employer] card for 2018 and requests CM assistance.  Per [facility] [employer] staff, a new card has been ordered for client.  Call placed to client to inform of the above info., left vm   Emerita Pineda RN, BC  Supervisor [facility]   [phone] (Fax)

## 2018-07-27 ENCOUNTER — PATIENT OUTREACH (OUTPATIENT)
Dept: GERIATRIC MEDICINE | Facility: CLINIC | Age: 78
End: 2018-07-27

## 2018-07-27 NOTE — PROGRESS NOTES
[facility] Care Coordination Contact  Rec'd vm from client requesting a return call  Call placed to client with an , client requested a [employer] card be mailed to his home address. Explained that CM will contact chago Maharaj that client call CM back if he has not rec'd within 1 month  8/20/18 Spoke with client to inform of home visit, client states that he is available.    Emerita Pineda RN, BC  Supervisor [facility]   [phone] (Fax)

## 2018-08-17 ENCOUNTER — PATIENT OUTREACH (OUTPATIENT)
Dept: GERIATRIC MEDICINE | Facility: CLINIC | Age: 78
End: 2018-08-17

## 2018-08-17 NOTE — PROGRESS NOTES
[facility] Care Coordination Contact  Contacted client's preferred  to schedule annual HRA, home visit arranged for 8/27/18 @ 9:30  Call placed to [facility] to report above info.  VM left with client requesting a return call   8/20/18 Call placed to client, to confirm annual HRA for 8/27/18  Emerita Pineda RN, BC  Manager [facility] Care Coordinator   [phone]  (Fax)

## 2018-08-27 ENCOUNTER — PATIENT OUTREACH (OUTPATIENT)
Dept: GERIATRIC MEDICINE | Facility: CLINIC | Age: 78
End: 2018-08-27

## 2018-08-27 DIAGNOSIS — Z76.89 HEALTH CARE HOME: ICD-10-CM

## 2018-08-27 ASSESSMENT — ACTIVITIES OF DAILY LIVING (ADL): DEPENDENT_IADLS:: CLEANING;LAUNDRY;SHOPPING;TRANSPORTATION

## 2018-08-27 NOTE — PROGRESS NOTES
[facility] Care Coordination Contact    [facility] Home Visit Assessment     Home visit for Health Risk Assessment with Chip CLIFFORD Rimma completed on August 27, 2018    Type of residence:: Apartment  Current living arrangement:: I live in a private home with family (son)   Chip requested CC assistance in contacting apt management to report kitchen faucet leaks, scruggs closet door does not close and he disconnected smoke alarm because it was beeping. [phone]  CC left vm requesting a return call.  Noted apt to be untidy, stains on carpet, boxes on the floor. Chip states that his son does the heavy cleaning.      Assessment completed with:: Patient (GULSHAN Pack  )    Current Care Plan  Member currently receiving the following home care services:   No   Member currently receiving the following community resources: Lifeline, Transportation Services ([employer] bus card)       Medication Review  Medication reconciliation completed in Epic: Yes  Medication set-up & administration: Independent and sets up on own weekly.  Self-administers medications.  Medication understanding concerns (by member, family or CC): No  Medication adherence concerns (by member, family or CC): No    Mental/Behavioral Health   Depression Screening: See PHQ assessment flowsheet.   Mental health DX:: No          Falls Assessment:   Fallen 2 or more times in the past year?: No   Any fall with injury in the past year?: No    ADL/IADL Dependencies:   Dependent ADLs:: Independent  Dependent IADLs:: Cleaning, Laundry, Shopping, Transportation    [facility] sponsored benefits: Shared information re: Silver Sneakers/gym memberships, ASA, Calcium +D.    PCA Assessment completed at visit: No     Elderly Waiver Eligibility: Yes-will continue on EW    Care Plan & Recommendations:   Chip has upcoming appt scheduled with Dr. Esquivel, recommended he review his ongoing right knee pain that interferes with his exercise program,  discuss FIT screening and pneumovac.     See LTCC for detailed assessment information.    Follow-Up Plan: Member informed of future contact, plan to f/u with member with a 6 month telephone assessment.  Contact information shared with member and family, encouraged member to call with any questions or concerns at any time.    [place] care continuum providers: Please refer to Health Care Home on the Epic Problem List to view this patient's [facility] Care Plan Summary.  MMIS completed, Rate Cell B, Case Mix L.  Care Plan completed.  Emerita Pineda RN, BC  Manager [facility] Care Coordinator   [phone]  (Fax)

## 2018-08-28 ENCOUNTER — PATIENT OUTREACH (OUTPATIENT)
Dept: GERIATRIC MEDICINE | Facility: CLINIC | Age: 78
End: 2018-08-28

## 2018-08-28 NOTE — LETTER
August 28, 2018    CHIP DUMONT  [address] [facility] [address] [place] [address]    Dear Chip,     At [facility], we re dedicated to improving the health and well-being of our members.  Enclosed you will find the Comprehensive Care Plan that was developed with you on August 27th, 2018. Please review the Care Plan carefully.    As a reminder, some of the things we discussed at your visit include:    Ways to improve or maintain your physical health such as walking 20 minutes a day.     Ways to reduce the risk of falls.     Health care needs you may have.     Don t forget to contact your care coordinator if you:    Have been hospitalized or plan to be hospitalized.     Have experienced a fall.      Have experienced a change in physical health, which may include bladder control and pain issues.      Are experiencing emotional problems.     If you do not agree with your Care Plan, have questions about it, or have experienced a change in your needs, please contact me, your care coordinator, at [phone]. If you are hearing impaired, please call the [place] Relay at 853 or [phone] (ngecxb-qr-suutzw relay service).    Sincerely,      Emerita Pineda RN  [facility]     [facility] is a health plan that contracts with both Medicare and the [place] Medical Assistance (Medicaid) program to provide benefits of both programs to enrollees. Enrollment in [facility] depends on contract renewal.    MSC+ [id number] ([id number])                                                  (12/16)

## 2018-08-28 NOTE — PROGRESS NOTES
[facility] Care Coordination Contact  Received after visit chart from care coordinator.  Completed following tasks: Mailed copy of care plan to client, Updated services in access and Submitted referrals/auths for PERS.      Chart was returned to GRIS Valadez  Case Management Specialist  [facility]  [phone]

## 2018-09-05 DIAGNOSIS — E11.21 TYPE 2 DIABETES MELLITUS WITH DIABETIC NEPHROPATHY, WITHOUT LONG-TERM CURRENT USE OF INSULIN (H): ICD-10-CM

## 2018-09-05 LAB — HBA1C MFR BLD: 8.5 % (ref 0–5.6)

## 2018-09-05 PROCEDURE — 36415 COLL VENOUS BLD VENIPUNCTURE: CPT | Performed by: INTERNAL MEDICINE

## 2018-09-05 PROCEDURE — 83036 HEMOGLOBIN GLYCOSYLATED A1C: CPT | Performed by: INTERNAL MEDICINE

## 2018-09-11 ENCOUNTER — OFFICE VISIT (OUTPATIENT)
Dept: INTERNAL MEDICINE | Facility: CLINIC | Age: 78
End: 2018-09-11
Payer: COMMERCIAL

## 2018-09-11 VITALS
SYSTOLIC BLOOD PRESSURE: 136 MMHG | HEIGHT: 60 IN | HEART RATE: 86 BPM | OXYGEN SATURATION: 97 % | WEIGHT: 171 LBS | BODY MASS INDEX: 33.57 KG/M2 | DIASTOLIC BLOOD PRESSURE: 70 MMHG | RESPIRATION RATE: 16 BRPM | TEMPERATURE: 98 F

## 2018-09-11 DIAGNOSIS — E11.21 TYPE 2 DIABETES MELLITUS WITH DIABETIC NEPHROPATHY, WITHOUT LONG-TERM CURRENT USE OF INSULIN (H): Primary | ICD-10-CM

## 2018-09-11 DIAGNOSIS — E78.5 HYPERLIPIDEMIA LDL GOAL <100: ICD-10-CM

## 2018-09-11 DIAGNOSIS — M25.561 RIGHT KNEE PAIN, UNSPECIFIED CHRONICITY: ICD-10-CM

## 2018-09-11 DIAGNOSIS — K21.9 GASTROESOPHAGEAL REFLUX DISEASE WITHOUT ESOPHAGITIS: ICD-10-CM

## 2018-09-11 DIAGNOSIS — H61.21 EXCESSIVE EAR WAX, RIGHT: ICD-10-CM

## 2018-09-11 DIAGNOSIS — E66.01 MORBID OBESITY DUE TO EXCESS CALORIES (H): ICD-10-CM

## 2018-09-11 DIAGNOSIS — I10 ESSENTIAL HYPERTENSION WITH GOAL BLOOD PRESSURE LESS THAN 140/90: ICD-10-CM

## 2018-09-11 PROCEDURE — 99214 OFFICE O/P EST MOD 30 MIN: CPT | Mod: 25 | Performed by: INTERNAL MEDICINE

## 2018-09-11 PROCEDURE — 69210 REMOVE IMPACTED EAR WAX UNI: CPT | Performed by: INTERNAL MEDICINE

## 2018-09-11 RX ORDER — LANCETS
EACH MISCELLANEOUS
Qty: 200 EACH | Refills: 1 | Status: SHIPPED | OUTPATIENT
Start: 2018-09-11 | End: 2019-03-13

## 2018-09-11 RX ORDER — LIDOCAINE 50 MG/G
PATCH TOPICAL
Qty: 30 PATCH | Refills: 0 | Status: SHIPPED | OUTPATIENT
Start: 2018-09-11 | End: 2018-12-12

## 2018-09-11 RX ORDER — ATENOLOL 50 MG/1
TABLET ORAL
Qty: 90 TABLET | Refills: 3 | Status: SHIPPED | OUTPATIENT
Start: 2018-09-11 | End: 2018-12-12

## 2018-09-11 RX ORDER — LOSARTAN POTASSIUM 50 MG/1
TABLET ORAL
Qty: 90 TABLET | Refills: 3 | Status: SHIPPED | OUTPATIENT
Start: 2018-09-11 | End: 2018-12-12

## 2018-09-11 RX ORDER — GLIMEPIRIDE 1 MG/1
1 TABLET ORAL
Qty: 90 TABLET | Refills: 1 | Status: SHIPPED | OUTPATIENT
Start: 2018-09-11 | End: 2018-12-12 | Stop reason: SINTOL

## 2018-09-11 RX ORDER — SIMVASTATIN 40 MG
TABLET ORAL
Qty: 90 TABLET | Refills: 3 | Status: SHIPPED | OUTPATIENT
Start: 2018-09-11 | End: 2019-03-13

## 2018-09-11 NOTE — PATIENT INSTRUCTIONS
A1c is 8.5, target is closer to 7.5 (like in January of 2018).  Stay on metformin--same dosing.   Add glimepiride 1 mg (lowest dose), one tablet each morning.     Let me know if side effects, like low glucose readings.   Keep doing your best with watching carbohydrate portions (bread/rice/pasta/potatoes), and keep walking regularly.     Check some occasional glucose readings before or after dinnertime.     Blood pressure and LDL-Cholesterol looked fine.     See me in December, with labs a few days in advance.   Call if any problems.

## 2018-09-11 NOTE — MR AVS SNAPSHOT
After Visit Summary   9/11/2018    Chip Shanks    MRN: [id number]           Patient Information     Date Of Birth          1940        Visit Information        Provider Department      9/11/2018 9:45 AM Liam Esquivel MD; [employer] [facility]        Today's Diagnoses     Type 2 diabetes mellitus with diabetic nephropathy, without long-term current use of insulin (H)        Essential hypertension with goal blood pressure less than 140/90        Hyperlipidemia LDL goal <100        Gastroesophageal reflux disease without esophagitis        Obesity: BMI> 35 with comorbidities          Care Instructions    A1c is 8.5, target is closer to 7.5 (like in January of 2018).  Stay on metformin--same dosing.   Add glimepiride 1 mg (lowest dose), one tablet each morning.     Let me know if side effects, like low glucose readings.   Keep doing your best with watching carbohydrate portions (bread/rice/pasta/potatoes), and keep walking regularly.     Check some occasional glucose readings before or after dinnertime.     Blood pressure and LDL-Cholesterol looked fine.     See me in December, with labs a few days in advance.   Call if any problems.           Follow-ups after your visit        Who to contact     If you have questions or need follow up information about today's clinic visit or your schedule please contact [facility] directly at [phone].  Normal or non-critical lab and imaging results will be communicated to you by MyChart, letter or phone within 4 business days after the clinic has received the results. If you do not hear from us within 7 days, please contact the clinic through [employer] or phone. If you have a critical or abnormal lab result, we will notify you by phone as soon as possible.  Submit refill requests through [email or web address] or call your pharmacy and they will forward the refill request to us. Please allow 3 business days for your  "refill to be completed.          Additional Information About Your Visit        Care EveryWhere ID     This is your Care EveryWhere ID. This could be used by other organizations to access your [place] medical records  [id number]        Your Vitals Were     Pulse Temperature Respirations Height Pulse Oximetry BMI (Body Mass Index)    86 98  F (36.7  C) (Oral) 16 4' 11\" (1.499 m) 97% 34.54 kg/m2       Blood Pressure from Last 3 Encounters:   09/11/18 136/70   06/08/18 134/78   02/07/18 134/84    Weight from Last 3 Encounters:   09/11/18 171 lb (77.6 kg)   06/08/18 171 lb (77.6 kg)   02/07/18 172 lb (78 kg)              Today, you had the following     No orders found for display         Today's Medication Changes          These changes are accurate as of 9/11/18 10:59 AM.  If you have any questions, ask your nurse or doctor.               Start taking these medicines.        Dose/Directions    glimepiride 1 MG tablet   Commonly known as:  AMARYL   Used for:  Type 2 diabetes mellitus with diabetic nephropathy, without long-term current use of insulin (H)   Started by:  Liam Esquivel MD        Dose:  1 mg   Take 1 tablet (1 mg) by mouth every morning (before breakfast)   Quantity:  90 tablet   Refills:  1         These medicines have changed or have updated prescriptions.        Dose/Directions    atenolol 50 MG tablet   Commonly known as:  TENORMIN   This may have changed:    - how much to take  - when to take this  - additional instructions   Used for:  Essential hypertension with goal blood pressure less than 140/90        TAKE 1 TABLET(50 MG) BY MOUTH DAILY   Quantity:  90 tablet   Refills:  3       * blood glucose monitoring lancets   This may have changed:  Another medication with the same name was changed. Make sure you understand how and when to take each.   Used for:  Type 2 diabetes mellitus with diabetic nephropathy, without long-term current use of insulin (H)   Changed by:  Liam Esquivel MD        " Use to test blood sugar 1 times daily.   Quantity:  100 each   Refills:  3       * MICROLET LANCETS Misc   This may have changed:  See the new instructions.   Used for:  Type 2 diabetes mellitus with diabetic nephropathy, without long-term current use of insulin (H)   Changed by:  Liam Esquivel MD        Check once daily   Quantity:  200 each   Refills:  1       * Notice:  This list has 2 medication(s) that are the same as other medications prescribed for you. Read the directions carefully, and ask your doctor or other care provider to review them with you.         Where to get your medicines      These medications were sent to [id number] - [facility] [address] [facility] [address]  [address] [facility] [address], [facility] [address]     Phone:  [phone]     atenolol 50 MG tablet    glimepiride 1 MG tablet    losartan 50 MG tablet    metFORMIN 500 MG tablet    MICROLET LANCETS Misc    ranitidine 150 MG tablet    simvastatin 40 MG tablet                Primary Care Provider Office Phone # Fax #    Liam Esquivel [phone]       [address]  [facility] [address]        Equal Access to Services     [facility]Y [facility] AH: Hadii aad ku hadasho Soomaali, waaxda luqadaha, qaybta kaalmada adeegyada, waxay idiin hayaan adeeg kharash la'[place] ah. So [facility] [phone].    ATENCIÓN: Si habla español, tiene a mata disposición servicios gratuitos de asistencia lingüística. Llame al [phone].    We comply with applicable federal civil rights laws and [place] laws. We do not discriminate on the basis of race, color, national origin, age, disability, sex, sexual orientation, or gender identity.            Thank you!     Thank you for choosing [facility]  for your care. Our goal is always to provide you with excellent care. Hearing back from our patients is one way we can continue to improve our services. Please take a few minutes to complete the written survey  that you may receive in the mail after your visit with us. Thank you!             Your Updated Medication List - Protect others around you: Learn how to safely use, store and throw away your medicines at www.disposemymeds.org.          This list is accurate as of 9/11/18 10:59 AM.  Always use your most recent med list.                   Brand Name Dispense Instructions for use Diagnosis    ACETAMINOPHEN PO      Take 500 mg by mouth every 8 hours as needed for pain (knee pain)        atenolol 50 MG tablet    TENORMIN    90 tablet    TAKE 1 TABLET(50 MG) BY MOUTH DAILY    Essential hypertension with goal blood pressure less than 140/90       blood glucose calibration solution     1 Bottle    Use to calibrate blood glucose monitor as directed.    Type 2 diabetes mellitus with diabetic nephropathy, without long-term current use of insulin (H)       * blood glucose monitoring lancets     100 each    Use to test blood sugar 1 times daily.    Type 2 diabetes mellitus with diabetic nephropathy, without long-term current use of insulin (H)       * MICROLET LANCETS Misc     200 each    Check once daily    Type 2 diabetes mellitus with diabetic nephropathy, without long-term current use of insulin (H)       * blood glucose monitoring meter device kit     1 kit    Use to test blood sugars 2 times daily or as directed.    Type 2 diabetes mellitus with diabetic nephropathy, without long-term current use of insulin (H)       * blood glucose monitoring meter device kit     1 kit    Use to test blood sugar 1 times daily.    Type 2 diabetes mellitus with diabetic nephropathy, without long-term current use of insulin (H)       * blood glucose monitoring test strip    VICTORIANO CONTOUR    200 strip    Use to test blood sugar one time daily or as directed.        * blood glucose monitoring test strip    ONETOUCH ULTRA    100 strip    Use to test blood sugar 1 times daily.    Type 2 diabetes mellitus with diabetic nephropathy, without  long-term current use of insulin (H)       cetirizine 10 MG tablet    zyrTEC    90 tablet    TAKE 1 TABLET BY MOUTH EVERY DAY    Urticaria       glimepiride 1 MG tablet    AMARYL    90 tablet    Take 1 tablet (1 mg) by mouth every morning (before breakfast)    Type 2 diabetes mellitus with diabetic nephropathy, without long-term current use of insulin (H)       losartan 50 MG tablet    COZAAR    90 tablet    TAKE 1 TABLET(50 MG) BY MOUTH DAILY    Type 2 diabetes mellitus with diabetic nephropathy, without long-term current use of insulin (H), Essential hypertension with goal blood pressure less than 140/90       metFORMIN 500 MG tablet    GLUCOPHAGE    270 tablet    TAKE 2 TABLETS BY MOUTH WITH BREAKFAST AND 1 TABLET BY MOUTH WITH SUPPER    Type 2 diabetes mellitus with diabetic nephropathy, without long-term current use of insulin (H)       order for DME     1 Units    Equipment being ordered: Cane    Dizziness, Gait instability       order for DME     1 each    Equipment being ordered: Heating pad.    Neck pain       ranitidine 150 MG tablet    ZANTAC    90 tablet    TAKE 1 TABLET(150 MG) BY MOUTH DAILY FOR HEARTBURN    Gastroesophageal reflux disease without esophagitis       simvastatin 40 MG tablet    ZOCOR    90 tablet    TAKE 1 TABLET(40 MG) BY MOUTH AT BEDTIME    Hyperlipidemia LDL goal <100       * Notice:  This list has 6 medication(s) that are the same as other medications prescribed for you. Read the directions carefully, and ask your doctor or other care provider to review them with you.

## 2018-09-11 NOTE — PROGRESS NOTES
SUBJECTIVE:   Chip Shanks is a 78 year old male who presents to clinic today for the following health issues:    diabetes mellitus not controlled, hyperlipidemia, hypertension, right ear discomfort.     Diabetes Follow-up    Patient is checking blood sugars: once daily.  Results are as follows:         am - 117-129    Diabetic concerns: None     Symptoms of hypoglycemia (low blood sugar): none     Paresthesias (numbness or burning in feet) or sores: No     Date of last diabetic eye exam: 11/20/2017    BP Readings from Last 2 Encounters:   09/11/18 136/70   06/08/18 134/78     Hemoglobin A1C (%)   Date Value   09/05/2018 8.5 (H)   06/01/2018 8.4 (H)     LDL Cholesterol Calculated (mg/dL)   Date Value   06/01/2018 63   01/30/2018 68       Diabetes Management Resources  Hyperlipidemia Follow-Up      Rate your low fat/cholesterol diet?: good    Taking statin?  Yes, no muscle aches from statin    Other lipid medications/supplements?:  none    Hypertension Follow-up      Outpatient blood pressures are being checked at home.  Results are 148/81.    Low Salt Diet: no added salt      Amount of exercise or physical activity: 4-5 days/week for an average of 15-30 minutes    Problems taking medications regularly: No    Medication side effects: none    Diet: low salt and low fat/cholesterol    The patient reports that AM glucoses run from .   He insists that he has been monitoring intake of carbohydrates.   We discussed that his A1c remains elevated and above target range.     He is initially quite resistant to add another medication. We discussed starting a low dose of glimepiride, and that he may contact me if he notes adverse effects.     He most recent LDL-Cholesterol looks fine, as does today's BP.     He notes a sense of fullness and discomfort in his right ear. The patient denies fevers, sore throat, nasal or sinus congestion.     The patient is tolerating his current medications without any adverse  "effects.    Past medical, family and social histories as well as medications reviewed and updated as needed.    Reviewed and updated as needed this visit by clinical staff  Tobacco  Allergies  Meds  Med Hx  Surg Hx  Fam Hx  Soc Hx      Reviewed and updated as needed this visit by Provider         No dyspnea or cough. No chest discomfort, dizziness or palpitations. No diarrhea, abdominal pain or rectal bleeding.   No acute problems with vision or speech, lateralizing weakness or paresthesias.    ROS: as above or negative for Respiratory, CV, GI, endocrine, neuro systems.     OBJECTIVE:                                                    /70 (BP Location: Right arm, Patient Position: Sitting, Cuff Size: Adult Large)  Pulse 86  Temp 98  F (36.7  C) (Oral)  Resp 16  Ht 4' 11\" (1.499 m)  Wt 171 lb (77.6 kg)  SpO2 97%  BMI 34.54 kg/m2  Body mass index is 34.54 kg/(m^2).     GENERAL: healthy, alert, well nourished, well hydrated, no distress  HENT: Ears: right TM initially occluded by cerumen. MD attempted to remove with curette, unsuccessful. After irrigation by nurse, TM's and canals clear bilaterally.  ear canals- normal; TMs- normal; Nose- normal; Mouth- no ulcers, no lesions  RESP: lungs clear to auscultation - no rales, no rhonchi, no wheezes  CV: regular rates and rhythm, normal S1 S2, no S3 or S4 and no murmur, no click or rub -       ASSESSMENT/PLAN:                                                    1. Type 2 diabetes mellitus with diabetic nephropathy, without long-term current use of insulin (H)  A1c not controlled. Will add glimepiride. Continue same dose of glimepiride.   See pt instructions and epic orders.     - MICROLET LANCETS MISC; Check once daily  Dispense: 200 each; Refill: 1  - metFORMIN (GLUCOPHAGE) 500 MG tablet; TAKE 2 TABLETS BY MOUTH WITH BREAKFAST AND 1 TABLET BY MOUTH WITH SUPPER  Dispense: 270 tablet; Refill: 1  - losartan (COZAAR) 50 MG tablet; TAKE 1 TABLET(50 MG) BY MOUTH " DAILY  Dispense: 90 tablet; Refill: 3  - glimepiride (AMARYL) 1 MG tablet; Take 1 tablet (1 mg) by mouth every morning (before breakfast)  Dispense: 90 tablet; Refill: 1    2. Essential hypertension with goal blood pressure less than 140/90  BP at target. Continue current meds.   - atenolol (TENORMIN) 50 MG tablet; TAKE 1 TABLET(50 MG) BY MOUTH DAILY  Dispense: 90 tablet; Refill: 3  - losartan (COZAAR) 50 MG tablet; TAKE 1 TABLET(50 MG) BY MOUTH DAILY  Dispense: 90 tablet; Refill: 3    3. Hyperlipidemia LDL goal <100  Last LDL at target. Continue current meds.   - simvastatin (ZOCOR) 40 MG tablet; TAKE 1 TABLET(40 MG) BY MOUTH AT BEDTIME  Dispense: 90 tablet; Refill: 3    4. Gastroesophageal reflux disease without esophagitis  Refill ranitidine.   - ranitidine (ZANTAC) 150 MG tablet; TAKE 1 TABLET(150 MG) BY MOUTH DAILY FOR HEARTBURN  Dispense: 90 tablet; Refill: 3    5. Right knee pain, unspecified chronicity  Patient has had samples of Lidoderm from a friend that helped right knee pain, and requests an Rx.   - lidocaine (LIDODERM) 5 % Patch; Apply up to 3 patches to painful area at once for up to 12 h within a 24 h period.  Remove after 12 hours.  Dispense: 30 patch; Refill: 0    6. (H61.21) Excessive ear wax, right  Comment: Wax removed as above, right TM normal.   Plan: REMOVE IMPACTED CERUMEN          Patient Instructions   A1c is 8.5, target is closer to 7.5 (like in January of 2018).  Stay on metformin--same dosing.   Add glimepiride 1 mg (lowest dose), one tablet each morning.     Let me know if side effects, like low glucose readings.   Keep doing your best with watching carbohydrate portions (bread/rice/pasta/potatoes), and keep walking regularly.     Check some occasional glucose readings before or after dinnertime.     Blood pressure and LDL-Cholesterol looked fine.     See me in December, with labs a few days in advance.   Call if any problems.      Liam Esquivel MD,   [facility]

## 2018-09-12 ENCOUNTER — TELEPHONE (OUTPATIENT)
Dept: INTERNAL MEDICINE | Facility: CLINIC | Age: 78
End: 2018-09-12

## 2018-09-12 DIAGNOSIS — M25.561 RIGHT KNEE PAIN, UNSPECIFIED CHRONICITY: ICD-10-CM

## 2018-09-12 RX ORDER — LIDOCAINE 50 MG/G
PATCH TOPICAL
Qty: 30 PATCH | Refills: 0 | Status: CANCELLED | OUTPATIENT
Start: 2018-09-12

## 2018-09-12 NOTE — TELEPHONE ENCOUNTER
Central Prior Authorization Team   Phone: [phone]    PA Initiation    Medication: lidoderm 5% patch  Insurance Company: [employer] - Phone [phone] Fax [phone]  Pharmacy Filling the Rx: [employer] [address] [place] [address] - [address] [place] [address] [address] AT [facility] [address]  Filling Pharmacy Phone: [phone]  Filling Pharmacy Fax: [phone]  Start Date: 9/12/2018

## 2018-09-12 NOTE — TELEPHONE ENCOUNTER
Prior Authorization Approval    Authorization Effective Date: 8/13/2018  Authorization Expiration Date: 9/12/2019  Medication: lidoderm 5% patch-APPROVED  Approved Dose/Quantity:    Reference #: [id number]   Insurance Company: [employer] - Phone [phone] Fax [phone]  Expected CoPay:       CoPay Card Available:      Foundation Assistance Needed:    Which Pharmacy is filling the prescription (Not needed for infusion/clinic administered): [facility] [address] [place] [address] E AT [facility] [address]  Pharmacy Notified: Yes  Patient Notified: Yes

## 2018-09-27 DIAGNOSIS — L50.9 URTICARIA: ICD-10-CM

## 2018-09-27 NOTE — TELEPHONE ENCOUNTER
"Requested Prescriptions   Pending Prescriptions Disp Refills     cetirizine (ZYRTEC) 10 MG tablet [Pharmacy Med Name: CETIRIZINE 10MG TABLETS]  Last Written Prescription Date:  5/13/2018  Last Fill Quantity: 90,  # refills: 0   Last office visit: 9/11/2018 with prescribing provider:     Future Office Visit:   Next 5 appointments (look out 90 days)     Dec 12, 2018  2:00 PM CST   Office Visit with Liam Esquivel MD   [facility]    [address]  [facility] [address]   [phone]                90 tablet 0     Sig: TAKE 1 TABLET BY MOUTH EVERY DAY    Antihistamines Protocol Failed    9/27/2018  4:19 PM       Failed - Patient is 3-64 years of age    Apply weight-based dosing for peds patients age 3 - 12 years of age.    Forward request to provider for patients under the age of 3 or over the age of 64.         Passed - Recent (12 mo) or future (30 days) visit within the authorizing provider's specialty    Patient had office visit in the last 12 months or has a visit in the next 30 days with authorizing provider or within the authorizing provider's specialty.  See \"Patient Info\" tab in inbasket, or \"Choose Columns\" in Meds & Orders section of the refill encounter.            "

## 2018-10-01 RX ORDER — CETIRIZINE HYDROCHLORIDE 10 MG/1
TABLET ORAL
Qty: 90 TABLET | Refills: 0 | Status: SHIPPED | OUTPATIENT
Start: 2018-10-01 | End: 2018-12-12

## 2018-10-01 NOTE — TELEPHONE ENCOUNTER
Prescription approved per [facility] Refill Protocol.  Hoda Ludwig RN

## 2018-10-15 ENCOUNTER — PATIENT OUTREACH (OUTPATIENT)
Dept: GERIATRIC MEDICINE | Facility: CLINIC | Age: 78
End: 2018-10-15

## 2018-10-15 NOTE — PROGRESS NOTES
Rec'd tele call from client, declined use of . Client requested assistance with scheduling eye exam.  Conference call placed to [facility] to schedule exam, last exam 11/20/18. Scheduled annual exam 11/27/18 @ 12:45, client requests  for exam (clinic to arrange).  Inquired if client rec'd a f/u call from the apt management to assist with leaking faucet, smoke detector. Client stated that he did not. Conference call placed to management ([phone], rec'd number from client), shared that client needs assistance with the above issues, request a call to this CC or to client.  Request that client contact CC if no return call from the apt management.  Emerita Pineda RN, BC  Manager [facility] Care Coordinator   [phone]  (Fax)

## 2018-11-01 ENCOUNTER — TRANSFERRED RECORDS (OUTPATIENT)
Dept: HEALTH INFORMATION MANAGEMENT | Facility: CLINIC | Age: 78
End: 2018-11-01

## 2018-12-07 DIAGNOSIS — E11.21 TYPE 2 DIABETES MELLITUS WITH DIABETIC NEPHROPATHY, WITHOUT LONG-TERM CURRENT USE OF INSULIN (H): ICD-10-CM

## 2018-12-07 LAB — HBA1C MFR BLD: 7.9 % (ref 0–5.6)

## 2018-12-07 PROCEDURE — 83036 HEMOGLOBIN GLYCOSYLATED A1C: CPT | Performed by: INTERNAL MEDICINE

## 2018-12-07 PROCEDURE — 36415 COLL VENOUS BLD VENIPUNCTURE: CPT | Performed by: INTERNAL MEDICINE

## 2018-12-12 ENCOUNTER — OFFICE VISIT (OUTPATIENT)
Dept: INTERNAL MEDICINE | Facility: CLINIC | Age: 78
End: 2018-12-12
Payer: COMMERCIAL

## 2018-12-12 VITALS
RESPIRATION RATE: 16 BRPM | HEART RATE: 79 BPM | DIASTOLIC BLOOD PRESSURE: 74 MMHG | HEIGHT: 60 IN | OXYGEN SATURATION: 99 % | WEIGHT: 169 LBS | BODY MASS INDEX: 33.18 KG/M2 | SYSTOLIC BLOOD PRESSURE: 130 MMHG | TEMPERATURE: 98.2 F

## 2018-12-12 DIAGNOSIS — I10 ESSENTIAL HYPERTENSION WITH GOAL BLOOD PRESSURE LESS THAN 140/90: ICD-10-CM

## 2018-12-12 DIAGNOSIS — M25.561 RIGHT KNEE PAIN, UNSPECIFIED CHRONICITY: ICD-10-CM

## 2018-12-12 DIAGNOSIS — E78.5 HYPERLIPIDEMIA LDL GOAL <100: ICD-10-CM

## 2018-12-12 DIAGNOSIS — L50.9 URTICARIA: ICD-10-CM

## 2018-12-12 DIAGNOSIS — Z23 NEED FOR PROPHYLACTIC VACCINATION AND INOCULATION AGAINST INFLUENZA: Primary | ICD-10-CM

## 2018-12-12 DIAGNOSIS — E11.21 TYPE 2 DIABETES MELLITUS WITH DIABETIC NEPHROPATHY, WITHOUT LONG-TERM CURRENT USE OF INSULIN (H): ICD-10-CM

## 2018-12-12 PROCEDURE — 90662 IIV NO PRSV INCREASED AG IM: CPT | Performed by: INTERNAL MEDICINE

## 2018-12-12 PROCEDURE — G0008 ADMIN INFLUENZA VIRUS VAC: HCPCS | Performed by: INTERNAL MEDICINE

## 2018-12-12 PROCEDURE — 99214 OFFICE O/P EST MOD 30 MIN: CPT | Mod: 25 | Performed by: INTERNAL MEDICINE

## 2018-12-12 RX ORDER — LIDOCAINE 50 MG/G
PATCH TOPICAL
Qty: 30 PATCH | Refills: 0 | Status: SHIPPED | OUTPATIENT
Start: 2018-12-12 | End: 2019-03-13

## 2018-12-12 RX ORDER — CETIRIZINE HYDROCHLORIDE 10 MG/1
10 TABLET ORAL DAILY
Qty: 90 TABLET | Refills: 0 | Status: SHIPPED | OUTPATIENT
Start: 2018-12-12 | End: 2019-03-13

## 2018-12-12 RX ORDER — LOSARTAN POTASSIUM 50 MG/1
TABLET ORAL
Qty: 90 TABLET | Refills: 1 | Status: SHIPPED | OUTPATIENT
Start: 2018-12-12 | End: 2019-03-13

## 2018-12-12 RX ORDER — BLOOD-GLUCOSE METER
EACH MISCELLANEOUS
Qty: 1 KIT | Refills: 0 | Status: CANCELLED | OUTPATIENT
Start: 2018-12-12

## 2018-12-12 RX ORDER — ATENOLOL 50 MG/1
TABLET ORAL
Qty: 90 TABLET | Refills: 3 | Status: SHIPPED | OUTPATIENT
Start: 2018-12-12 | End: 2019-03-13

## 2018-12-12 ASSESSMENT — MIFFLIN-ST. JEOR: SCORE: 1323.21

## 2018-12-12 NOTE — PROGRESS NOTES
Injectable Influenza Immunization Documentation    1.  Is the person to be vaccinated sick today?   No    2. Does the person to be vaccinated have an allergy to a component   of the vaccine?   No  Egg Allergy Algorithm Link    3. Has the person to be vaccinated ever had a serious reaction   to influenza vaccine in the past?   No    4. Has the person to be vaccinated ever had Guillain-Barré syndrome?   No

## 2018-12-12 NOTE — Clinical Note
Please abstract the following data from this visit with this patient into the appropriate field in Epic:Eye exam with ophthalmology on this date: [facility], November 2018. Abdominal Pain, N/V/D

## 2018-12-12 NOTE — PATIENT INSTRUCTIONS
Diabetes test (Hemoglobin A1c) is improved! Good job! Keep watching your carbohydrate intake (bread, rice, pasta, potatoes, sweets).     Last LDL-Cholesterol looked fine in June.     Blood pressure looks fine.     Refills of all needed medications sent to your pharmacy.     See me back in three months, with labs a few days in advance.

## 2018-12-12 NOTE — PROGRESS NOTES
SUBJECTIVE:   Chip Shanks is a 78 year old male who presents to clinic today for the following health issues:    Diabetes Follow-up    Patient is checking blood sugars: once daily.  Results are as follows:         am -     Diabetic concerns: None     Symptoms of hypoglycemia (low blood sugar): none     Paresthesias (numbness or burning in feet) or sores: No     Date of last diabetic eye exam: November 2018 at [facility]    Hemoglobin A1C (%)   Date Value   12/07/2018 7.9 (H)   09/05/2018 8.5 (H)     Diabetes Management Resources    A1c has improved. Recalled that glimepiride 1 mg was added on 9/11/2018. Patient reports that he was getting low glucose readings and experiencing symptoms of hypoglycemia which included tremors, so he discontinued taking it. He continues on metformin 1,000 mg AM and 500 mg PM.     Patient presents a log of home glucose readings, all of which were within target range. Patient relates that he has cut out red meat from his diet and has reduced his food portion sizes. He proudly share that he has lost 3 lbs.     Hyperlipidemia Follow-Up      Rate your low fat/cholesterol diet?: good    Taking statin?  Yes, no muscle aches from statin    Other lipid medications/supplements?:  None    LDL Cholesterol Calculated (mg/dL)   Date Value   06/01/2018 63   01/30/2018 68     LDL controlled with simvastatin 40 mg daily.     Hypertension Follow-up      Outpatient blood pressures are being checked at home.  Results are 128/79-65.    Low Salt Diet: no added salt    Amount of exercise or physical activity: 4-5 days/week for an average of 15-30 minutes    Problems taking medications regularly: No    Medication side effects: none    Diet: low salt and low fat/cholesterol    BP Readings from Last 3 Encounters:   12/12/18 130/74   09/11/18 136/70   06/08/18 134/78     BP controlled with losartan 50 mg and atenolol 50 mg daily.     Recent pneumonia  Patient was evaluated at an outside clinic on  "11/23/2018 for symptoms including cough, rhinorrhea, sore throat, and low grade fever. It was suspected he had a URI and he was recommended symptomatic treatment. He was then seen by Erik Cadena PA-C on 11/29/2018 for ongoing fevers and cough. He had abnormal lung sounds and was treated with azithromycin for pneumonia of left lower lobe of lung. Patient reports that symptoms have cleared and his breathing is fine.     Bilateral knee pain  He is using the lidoderm patches which are working well.     Problem list and histories reviewed & adjusted, as indicated.  Additional history: as documented    Reviewed and updated as needed this visit by clinical staff  Tobacco  Allergies  Meds  Med Hx  Surg Hx  Fam Hx  Soc Hx      Reviewed and updated as needed this visit by Provider       ROS:  No dyspnea or cough. No chest discomfort, dizziness or palpitations. No diarrhea, abdominal pain or rectal bleeding.   No acute problems with vision or speech, lateralizing weakness or paresthesias.    ROS: as above or negative for Respiratory, CV, GI, endocrine, neuro systems.    This document serves as a record of the services and decisions personally performed and made by Liam Esquivel MD. It was created on his behalf by Becky Gomez, a trained medical scribe. The creation of this document is based on the provider's statements to the medical scribe.  Becky Gomez December 12, 2018 2:20 PM     OBJECTIVE:     /74 (BP Location: Right arm, Patient Position: Sitting, Cuff Size: Adult Large)   Pulse 79   Temp 98.2  F (36.8  C) (Oral)   Resp 16   Ht 1.499 m (4' 11\")   Wt 76.7 kg (169 lb)   SpO2 99%   BMI 34.13 kg/m    Body mass index is 34.13 kg/m .     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 Patient has d/c'd glimepiride due to symptoms of hypoglycemia, and has worked harder on non-Rx measures. Continue metformin. Recommended working on monitoring carb consumption  Plan: blood glucose calibration (ONETOUCH ULTRA         CONTROL) solution, blood glucose monitoring         (ONE TOUCH DELICA) lancets, blood glucose         monitoring (ONETOUCH ULTRA) test strip,         losartan (COZAAR) 50 MG tablet, metFORMIN         (GLUCOPHAGE) 500 MG tablet    (E78.5) Hyperlipidemia LDL goal <100  Last LDL at target. Continue current meds.     (I10) Essential hypertension with goal blood pressure less than 140/90  Comment: BP at target. Continue current meds.  Plan: atenolol (TENORMIN) 50 MG tablet, losartan         (COZAAR) 50 MG tablet          (L50.9) Urticaria  Comment: Stable, refilled rx  Plan: cetirizine (ZYRTEC) 10 MG tablet          (M25.561) Right knee pain, unspecified chronicity  Comment: Stable, refilled rx  Plan: lidocaine (LIDODERM) 5 % patch    (Z23) Need for prophylactic vaccination and inoculation against influenza  (primary encounter diagnosis)  Plan: FLU VACCINE, INCREASED ANTIGEN, PRESV FREE, AGE        65+ [58485], ADMIN INFLUENZA (For MEDICARE         Patients ONLY) []          FUTURE APPOINTMENTS:       - Follow-up visit in 3 months    Patient Instructions   Diabetes test (Hemoglobin A1c) is improved! Good job! Keep watching your carbohydrate intake (bread, rice, pasta, potatoes, sweets).     Last LDL-Cholesterol looked fine in June.     Blood pressure looks fine.     Refills of all needed  medications sent to your pharmacy.     See me back in three months, with labs a few days in advance.       The information in this document, created by the medical scribe for me, accurately reflects the services I personally performed and the decisions made by me. I have reviewed and approved this document for accuracy prior to leaving the patient care area.  December 12, 2018 2:35 PM    Liam Esquivel MD  [facility]

## 2018-12-12 NOTE — LETTER
"  [facility]  [address] [place] [address]  [phone]    12/12/2018    Chip Shanks  [address]   [place] [address]           Dear Mr. Shanks,    The results of your lab tests are enclosed. Everything looks fine. Unless noted otherwise below, any results that are outside the \"normal\" range are within acceptable limits and are of no concern.    Hemoglobin A1C measures control of diabetes. Your Hemoglobin A1C was 7.9, with the target being under 8.0.       If you have any further questions or problems, please contact our office.    Sincerely,        Liam Esquivel MD  Attachment: Lab results      "

## 2019-02-20 ENCOUNTER — PATIENT OUTREACH (OUTPATIENT)
Dept: GERIATRIC MEDICINE | Facility: CLINIC | Age: 79
End: 2019-02-20

## 2019-02-20 NOTE — PROGRESS NOTES
[facility] Care Coordination Contact  .    [facility] Six-Month Telephone Assessment  2/20/19 Attempted to reach member to complete 6 month telephone assessment, no answer and not able to leave a voice message.     2/21/19 second attempt to reach member to complete 6 month, no answer, unable to leave vm.    Farzaneh Todd, RN Care Coordinator  [facility]  [phone]

## 2019-02-26 ENCOUNTER — PATIENT OUTREACH (OUTPATIENT)
Dept: GERIATRIC MEDICINE | Facility: CLINIC | Age: 79
End: 2019-02-26

## 2019-02-26 NOTE — PROGRESS NOTES
[facility] Care Coordination Contact    Called member to complete six month assessment, unable to leave a message.   3rd attempt to reach member, will proceed with Unable to Reach letter.   Farzaneh Todd RN Care Coordinator  [facility]  [phone]

## 2019-02-26 NOTE — LETTER
February 27, 2019      CHIP DUMONT  [address]     [place] [address]        Dear Chip:     Your Care Coordinator has been unable to reach you by telephone. I am writing to ask you or a family member to call me at  . If you reach my voicemail, please leave a message with your daytime telephone number and a date and time that I can call you. If you are hearing impaired, please call the [place] Relay at [phone] or [phone] ([id number] relay service).     The reason I am trying to reach you is:    [] Six (6) month check-in  [x] To schedule your annual assessment  [] Other:      Please call me as soon as you receive this letter. I look forward to speaking with you.    Sincerely,    Email: [email or web address]  Phone: [phone]      [facility] is a health plan that contracts with both Medicare and the [place] Medical Assistance (Medicaid) program to provide benefits of both programs to enrollees. Enrollment in [employer] depends on contract renewal.    [id number])    [id number] (11/18)

## 2019-02-27 NOTE — PROGRESS NOTES
"[facility] Care Coordination Contact    Per CC, mailed client an \"Unable to Contact\" letter.    [facility]  Care Management Specialist  [facility]  [phone]      "

## 2019-03-05 DIAGNOSIS — E78.5 HYPERLIPIDEMIA LDL GOAL <100: ICD-10-CM

## 2019-03-05 DIAGNOSIS — E11.21 TYPE 2 DIABETES MELLITUS WITH DIABETIC NEPHROPATHY, WITHOUT LONG-TERM CURRENT USE OF INSULIN (H): ICD-10-CM

## 2019-03-05 LAB
ALBUMIN SERPL-MCNC: 3.8 G/DL (ref 3.4–5)
ALP SERPL-CCNC: 45 U/L (ref 40–150)
ALT SERPL W P-5'-P-CCNC: 38 U/L (ref 0–70)
ANION GAP SERPL CALCULATED.3IONS-SCNC: 10 MMOL/L (ref 3–14)
AST SERPL W P-5'-P-CCNC: 30 U/L (ref 0–45)
BILIRUB SERPL-MCNC: 0.7 MG/DL (ref 0.2–1.3)
BUN SERPL-MCNC: 8 MG/DL (ref 7–30)
CALCIUM SERPL-MCNC: 9.1 MG/DL (ref 8.5–10.1)
CHLORIDE SERPL-SCNC: 103 MMOL/L (ref 94–109)
CHOLEST SERPL-MCNC: 132 MG/DL
CO2 SERPL-SCNC: 25 MMOL/L (ref 20–32)
CREAT SERPL-MCNC: 0.9 MG/DL (ref 0.66–1.25)
GFR SERPL CREATININE-BSD FRML MDRD: 82 ML/MIN/{1.73_M2}
GLUCOSE SERPL-MCNC: 138 MG/DL (ref 70–99)
HBA1C MFR BLD: 8.3 % (ref 0–5.6)
HDLC SERPL-MCNC: 45 MG/DL
LDLC SERPL CALC-MCNC: 64 MG/DL
NONHDLC SERPL-MCNC: 87 MG/DL
POTASSIUM SERPL-SCNC: 3.6 MMOL/L (ref 3.4–5.3)
PROT SERPL-MCNC: 8.1 G/DL (ref 6.8–8.8)
SODIUM SERPL-SCNC: 138 MMOL/L (ref 133–144)
TRIGL SERPL-MCNC: 114 MG/DL

## 2019-03-05 PROCEDURE — 36415 COLL VENOUS BLD VENIPUNCTURE: CPT | Performed by: INTERNAL MEDICINE

## 2019-03-05 PROCEDURE — 80061 LIPID PANEL: CPT | Performed by: INTERNAL MEDICINE

## 2019-03-05 PROCEDURE — 80053 COMPREHEN METABOLIC PANEL: CPT | Performed by: INTERNAL MEDICINE

## 2019-03-05 PROCEDURE — 83036 HEMOGLOBIN GLYCOSYLATED A1C: CPT | Performed by: INTERNAL MEDICINE

## 2019-03-06 ENCOUNTER — PATIENT OUTREACH (OUTPATIENT)
Dept: GERIATRIC MEDICINE | Facility: CLINIC | Age: 79
End: 2019-03-06

## 2019-03-06 NOTE — PROGRESS NOTES
"[facility] Care Coordination Contact      [facility] Six-Month Telephone Assessment    6 month telephone assessment completed on 3/6/2019  Rec'd vm from client on 3/5/19 requesting a return call.  Call placed to client with an .  Client would like CC to order him a 2019 [employer] Card.     Client reports that he is doing well, \"stable, taking my medications as ordered by my doctor.\"  Reviewed hx of pneumonia this past year, client shared that he was sick with high temperatures and cough.      ER visits: No  Hospitalizations: No  TCU stays: No  Significant health status changes: None reported  Falls/Injuries: No  ADL/IADL changes: No  Changes in services: No    Caregiver Assessment follow up:  N/a     Goals: See POC in chart for goal progress documentation.    Chip continues to report knee pain when walking down the stairs, states that his brace and prn pain medication is helpful.  Chip has a f/u with PCP on 3/13/19, will review colon ca screening.     Will see member in 6 months for an annual health risk assessment.   Encouraged member to call CC with any questions or concerns in the meantime.     Emerita Pineda RN, BC  Manager [facility] Care Coordinator   [phone]  (Fax)          "

## 2019-03-13 ENCOUNTER — OFFICE VISIT (OUTPATIENT)
Dept: INTERNAL MEDICINE | Facility: CLINIC | Age: 79
End: 2019-03-13
Payer: COMMERCIAL

## 2019-03-13 VITALS
RESPIRATION RATE: 16 BRPM | TEMPERATURE: 97.9 F | OXYGEN SATURATION: 95 % | HEIGHT: 60 IN | SYSTOLIC BLOOD PRESSURE: 130 MMHG | WEIGHT: 171.2 LBS | DIASTOLIC BLOOD PRESSURE: 78 MMHG | BODY MASS INDEX: 33.61 KG/M2 | HEART RATE: 87 BPM

## 2019-03-13 DIAGNOSIS — E11.21 TYPE 2 DIABETES MELLITUS WITH DIABETIC NEPHROPATHY, WITHOUT LONG-TERM CURRENT USE OF INSULIN (H): ICD-10-CM

## 2019-03-13 DIAGNOSIS — E78.5 HYPERLIPIDEMIA LDL GOAL <100: ICD-10-CM

## 2019-03-13 DIAGNOSIS — K21.9 GASTROESOPHAGEAL REFLUX DISEASE WITHOUT ESOPHAGITIS: ICD-10-CM

## 2019-03-13 DIAGNOSIS — L50.9 URTICARIA: ICD-10-CM

## 2019-03-13 DIAGNOSIS — M25.561 RIGHT KNEE PAIN, UNSPECIFIED CHRONICITY: ICD-10-CM

## 2019-03-13 DIAGNOSIS — I10 ESSENTIAL HYPERTENSION WITH GOAL BLOOD PRESSURE LESS THAN 140/90: ICD-10-CM

## 2019-03-13 PROCEDURE — 99214 OFFICE O/P EST MOD 30 MIN: CPT | Performed by: INTERNAL MEDICINE

## 2019-03-13 RX ORDER — LIDOCAINE 50 MG/G
PATCH TOPICAL
Qty: 30 PATCH | Refills: 3 | Status: SHIPPED | OUTPATIENT
Start: 2019-03-13 | End: 2020-09-29

## 2019-03-13 RX ORDER — BLOOD-GLUCOSE METER
EACH MISCELLANEOUS
Qty: 1 KIT | Refills: 0 | Status: SHIPPED | OUTPATIENT
Start: 2019-03-13

## 2019-03-13 RX ORDER — LANCETS
EACH MISCELLANEOUS
Qty: 200 EACH | Refills: 1 | Status: SHIPPED | OUTPATIENT
Start: 2019-03-13 | End: 2021-01-13

## 2019-03-13 RX ORDER — LOSARTAN POTASSIUM 50 MG/1
TABLET ORAL
Qty: 90 TABLET | Refills: 3 | Status: SHIPPED | OUTPATIENT
Start: 2019-03-13 | End: 2019-09-20

## 2019-03-13 RX ORDER — CETIRIZINE HYDROCHLORIDE 10 MG/1
10 TABLET ORAL DAILY
Qty: 90 TABLET | Refills: 3 | Status: SHIPPED | OUTPATIENT
Start: 2019-03-13 | End: 2020-05-11

## 2019-03-13 RX ORDER — ATENOLOL 50 MG/1
TABLET ORAL
Qty: 90 TABLET | Refills: 3 | Status: SHIPPED | OUTPATIENT
Start: 2019-03-13 | End: 2020-02-17

## 2019-03-13 RX ORDER — SIMVASTATIN 40 MG
TABLET ORAL
Qty: 90 TABLET | Refills: 3 | Status: SHIPPED | OUTPATIENT
Start: 2019-03-13 | End: 2020-05-11

## 2019-03-13 ASSESSMENT — MIFFLIN-ST. JEOR: SCORE: 1333.19

## 2019-03-13 NOTE — PROGRESS NOTES
SUBJECTIVE:   Chip Shanks is a 78 year old male who presents to clinic today for the following health issues:    Patient present with interpretor.     Diabetes Follow-up    Patient is checking blood sugars: once daily.  Results are as follows:         am - 131, 141    Diabetic concerns: None     Symptoms of hypoglycemia (low blood sugar): none     Paresthesias (numbness or burning in feet) or sores: No     Date of last diabetic eye exam: Not recently    Diabetes Management Resources    Hemoglobin A1C (%)   Date Value   03/05/2019 8.3 (H)   12/07/2018 7.9 (H)     A1c increased and is above target range. Patient is currently taking metformin 1,000 mg AM and 500 mg PM. Notes that when he tried increasing dosage to 1,000 mg twice daily, it upset his stomach. He is exercising on the treadmill daily for 20-30 minutes.     AM fasting glucose levels average 110-124 mg/dL and range between 115-150 mg/dL. Today levels were 141 mg/dL.     Hyperlipidemia Follow-Up      Rate your low fat/cholesterol diet?: good    Taking statin?  Yes, no muscle aches from statin    Other lipid medications/supplements?:  None    LDL Cholesterol Calculated (mg/dL)   Date Value   03/05/2019 64   06/01/2018 63     LDL controlled with simvastatin 40 mg daily.     Hypertension Follow-up      Outpatient blood pressures are being checked at home and store.  Results are 118/86.    Low Salt Diet: not monitoring salt    Amount of exercise or physical activity: 6-7 days/week for an average of 15-30 minutes    Problems taking medications regularly: No    Medication side effects: none    Diet: regular (no restrictions)    BP Readings from Last 3 Encounters:   03/13/19 130/78   12/12/18 130/74   09/11/18 136/70     First BP reading elevated, 155/82. Second BP reading improved to 130/78. Patient notes that he has not taken his BP meds today. He checks his BP about twice a week at home and readings are within target range. He is currently on losartan 50  "mg and atenolol 50 mg daily.     GERD  Symptoms controlled with Ranitidine.     Past/recent records reviewed and discussed for:  -He is still uses the lidocaine patches prn for bilateral knee pains. Pain is usually triggered by walking down stairs.     Problem list and histories reviewed & adjusted, as indicated.  Additional history: as documented    Labs reviewed in EPIC    Reviewed and updated as needed this visit by clinical staff  Tobacco  Allergies  Meds  Med Hx  Surg Hx  Fam Hx  Soc Hx      Reviewed and updated as needed this visit by Provider         ROS:  No dyspnea or cough. No chest discomfort, dizziness or palpitations. No diarrhea, abdominal pain or rectal bleeding.   No acute problems with vision or speech, lateralizing weakness or paresthesias.    ROS: as above or negative for Respiratory, CV, GI, endocrine, neuro systems.    This document serves as a record of the services and decisions personally performed and made by Liam Esquivel MD. It was created on his behalf by Becky Gomez, a trained medical scribe. The creation of this document is based on the provider's statements to the medical scribe.  Becky Gomez March 13, 2019 2:37 PM     OBJECTIVE:     /78   Pulse 87   Temp 97.9  F (36.6  C) (Oral)   Resp 16   Ht 1.499 m (4' 11\")   Wt 77.7 kg (171 lb 3.2 oz)   SpO2 95%   BMI 34.58 kg/m    Body mass index is 34.58 kg/m .     GENERAL: healthy, alert and no distress  RESP: lungs clear to auscultation - no rales, rhonchi or wheezes  CV: regular rate and rhythm, normal S1 S2, no S3 or S4, no murmur, click or rub, no peripheral edema and peripheral pulses strong  MS: no gross musculoskeletal defects noted, no edema  SKIN: no suspicious lesions or rashes  NEURO: Normal strength and tone, mentation intact and speech normal  PSYCH: mentation appears normal, affect normal/bright    Diagnostic Test Results:  none     ASSESSMENT/PLAN:   (E11.21) Type 2 diabetes mellitus with diabetic " nephropathy, without long-term current use of insulin (H)  Comment: A1c increased and is now above target range. Discussed considering increasing metformin dosage and recommended switching to metformin ER to reduce potential side effects. Patient declined and prefers to make no med changes for now. Discussed that if next A1c is above 8, we will likely need to make medication changes. Continue working on diet choices and exercise regime.  Plan: blood glucose calibration (ONETOUCH ULTRA         CONTROL) solution, blood glucose monitoring         (VICTORIANO CONTOUR MONITOR) meter device kit, blood        glucose (VICTORIANO CONTOUR) test strip, blood         glucose monitoring (ONE TOUCH DELICA) lancets,         blood glucose monitoring (ONE TOUCH ULTRA 2)         meter device kit, blood glucose (ONETOUCH         ULTRA) test strip, losartan (COZAAR) 50 MG         tablet, metFORMIN (GLUCOPHAGE) 500 MG tablet,         MICROLET LANCETS MISC          (I10) Essential hypertension with goal blood pressure less than 140/90  Comment: BP at target. Refilled rx, continue current meds.  Plan: atenolol (TENORMIN) 50 MG tablet, losartan         (COZAAR) 50 MG tablet          (E78.5) Hyperlipidemia LDL goal <100  Comment: LDL at target. Continue current meds.  Plan: simvastatin (ZOCOR) 40 MG tablet          (L50.9) Urticaria  Comment: Stable. Continue current meds.   Plan: cetirizine (ZYRTEC) 10 MG tablet          (M25.561) Right knee pain, unspecified chronicity  Comment: Stable. Patient uses lidocaine patches prn   Plan: lidocaine (LIDODERM) 5 % patch          (K21.9) Gastroesophageal reflux disease without esophagitis  Comment: Stable. Refilled rx  Plan: ranitidine (ZANTAC) 150 MG tablet          FUTURE APPOINTMENTS:       - Follow-up visit in 3 months    Patient Instructions   A1c is just above target, at 8.3. Goal is below 8.0.  Keep doing the exercise and watching carbohydrate intake.   Take three metformin tablets daily.   No other  medication changes made today.     Blood pressure and LDL-Cholesterol also look fine.     See me back in three months, with labs a few days before the appointment.   If next A1c above 8.0, we might need to look at higher dose of metformin or adding a different pill to the same metformin.     The information in this document, created by the medical scribe for me, accurately reflects the services I personally performed and the decisions made by me. I have reviewed and approved this document for accuracy prior to leaving the patient care area.  March 13, 2019 2:54 PM    Liam Esquivel MD,   [facility]

## 2019-03-13 NOTE — NURSING NOTE
"Vital signs:  Temp: 97.9  F (36.6  C) Temp src: Oral BP: 155/82 Pulse: 87   Resp: 16 SpO2: 95 %     Height: 149.9 cm (4' 11\") Weight: 77.7 kg (171 lb 3.2 oz)  Estimated body mass index is 34.58 kg/m  as calculated from the following:    Height as of this encounter: 1.499 m (4' 11\").    Weight as of this encounter: 77.7 kg (171 lb 3.2 oz).          "

## 2019-03-13 NOTE — PATIENT INSTRUCTIONS
A1c is just above target, at 8.3. Goal is below 8.0.  Keep doing the exercise and watching carbohydrate intake.   Take three metformin tablets daily.   No other medication changes made today.     Blood pressure and LDL-Cholesterol also look fine.     See me back in three months, with labs a few days before the appointment.   If next A1c above 8.0, we might need to look at higher dose of metformin or adding a different pill to the same metformin.

## 2019-03-13 NOTE — LETTER
"  [facility]  [address] [place] [address]  [phone]    3/13/2019    [facility] CLIFFORD Rimma  [address]   [facility] [address]           Dear Nuria Rimma,    The results of your lab tests are enclosed. Everything looks fine. Unless noted otherwise below, any results that are outside the \"normal\" range are within acceptable limits and are of no concern.    Hemoglobin A1C measures control of diabetes. Your Hemoglobin A1C is shown. The ideal target is under 7.0, although below 8.0 may be acceptable for some patients.    LDL= Bad Cholesterol-- the target is below 100.     HDL= Good Cholesterol-- although this is determined mostly by heredity, exercise and/or medications may sometimes raise this number.    Triglycerides are another type of fat in the blood, and can sometimes be lowered by reducing intake of sweets or excess carbohydrates, alcohol, and by weight reduction if needed.  Sometimes medications are also used.    AST and ALT are liver tests, as are the bilirubin (total and direct), albumin, total protein, and alkaline phosphatase. Yours are all normal.     Urea Nitrogen and Creatinine are kidney tests--yours are normal. GFR stands for Glomerular Filtration Rate, a more complicated estimate of kidney function.    Sodium, Potassium, Chloride, Carbon Dioxide, and Calcium are all normal salts in the bloodstream. Yours all look normal. Your glucose (blood sugar) also looks fine. (You can ignore the anion gap result).     If you have any further questions or problems, please contact our office.    Sincerely,    Liam Esquivel MD  Attachment: Lab results     "

## 2019-06-06 DIAGNOSIS — E11.21 TYPE 2 DIABETES MELLITUS WITH DIABETIC NEPHROPATHY, WITHOUT LONG-TERM CURRENT USE OF INSULIN (H): ICD-10-CM

## 2019-06-06 LAB — HBA1C MFR BLD: 8 % (ref 0–5.6)

## 2019-06-06 PROCEDURE — 83036 HEMOGLOBIN GLYCOSYLATED A1C: CPT | Performed by: INTERNAL MEDICINE

## 2019-06-06 PROCEDURE — 36415 COLL VENOUS BLD VENIPUNCTURE: CPT | Performed by: INTERNAL MEDICINE

## 2019-06-13 ENCOUNTER — OFFICE VISIT (OUTPATIENT)
Dept: INTERNAL MEDICINE | Facility: CLINIC | Age: 79
End: 2019-06-13
Payer: COMMERCIAL

## 2019-06-13 VITALS
BODY MASS INDEX: 33.77 KG/M2 | RESPIRATION RATE: 15 BRPM | DIASTOLIC BLOOD PRESSURE: 76 MMHG | HEIGHT: 60 IN | OXYGEN SATURATION: 99 % | SYSTOLIC BLOOD PRESSURE: 136 MMHG | WEIGHT: 172 LBS | TEMPERATURE: 98.3 F | HEART RATE: 72 BPM

## 2019-06-13 DIAGNOSIS — E78.5 HYPERLIPIDEMIA LDL GOAL <100: ICD-10-CM

## 2019-06-13 DIAGNOSIS — E11.21 TYPE 2 DIABETES MELLITUS WITH DIABETIC NEPHROPATHY, WITHOUT LONG-TERM CURRENT USE OF INSULIN (H): Primary | ICD-10-CM

## 2019-06-13 DIAGNOSIS — I10 ESSENTIAL HYPERTENSION WITH GOAL BLOOD PRESSURE LESS THAN 140/90: ICD-10-CM

## 2019-06-13 PROCEDURE — 99214 OFFICE O/P EST MOD 30 MIN: CPT | Performed by: INTERNAL MEDICINE

## 2019-06-13 ASSESSMENT — MIFFLIN-ST. JEOR: SCORE: 1331.82

## 2019-06-13 NOTE — PATIENT INSTRUCTIONS
No medication changes.   Keep up your walking every day, and watch your amounts of bread, rice and pasta.     See me in September 2019 (three months), with labs a few days in advance.

## 2019-06-13 NOTE — PROGRESS NOTES
Subjective     Chip Shanks is a 79 year old male who presents to clinic today for the following health issues:    HPI   Diabetes Follow-up      How often are you checking your blood sugar? One time daily    What time of day are you checking your blood sugars (select all that apply)?  Before meals    Have you had any blood sugars above 200?  No    Have you had any blood sugars below 70?  Yes     What symptoms do you notice when your blood sugar is low?  Shaky, Lethargy and Other: hungry    What concerns do you have today about your diabetes? None and Low blood sugar     Do you have any of these symptoms? (Select all that apply)  Numbness in feet and Burning in feet     Have you had a diabetic eye exam in the last 12 months? Yes- Date of last eye exam: unknown    BP Readings from Last 2 Encounters:   03/13/19 130/78   12/12/18 130/74     Hemoglobin A1C (%)   Date Value   06/06/2019 8.0 (H)   03/05/2019 8.3 (H)     LDL Cholesterol Calculated (mg/dL)   Date Value   03/05/2019 64   06/01/2018 63       Diabetes Management Resources  Hyperlipidemia Follow-Up      Are you having any of the following symptoms? (Select all that apply)  No complaints of shortness of breath, chest pain or pressure.  No increased sweating or nausea with activity.  No left-sided neck or arm pain.  No complaints of pain in calves when walking 1-2 blocks.    Are you regularly taking any medication or supplement to lower your cholesterol?   Yes- Simvastatin    Are you having muscle aches or other side effects that you think could be caused by your cholesterol lowering medication?  No      Hypertension Follow-up      Do you check your blood pressure regularly outside of the clinic? Yes     Are you following a low salt diet? Yes    Are your blood pressures ever more than 140 on the top number (systolic) OR more   than 90 on the bottom number (diastolic), for example 140/90? No    Amount of exercise or physical activity: 6-7 days/week for an  "average of 15-30 minutes    Problems taking medications regularly: No    Medication side effects: none    Diet: low salt, low fat/cholesterol, diabetic and carbohydrate counting      PROBLEMS TO ADD ON...  The patient reports taking metformin at 3-4 tabs daily. He tends most often to limit his dose to 3 tabs. He feels that he notes more frequent symptoms of fatigue, hunger and shakiness when he takes four tabs.     He reports that his pre-dinner glucoses run in the  range. AM glucoses tend to run in the 128-183.    He is walking 30 minutes daily.   We reviewed his typical meal plan. He tends to have four slices of toast in the AM, and is urged to cut this in half.     Reviewed and updated as needed this visit by Provider         Review of Systems   No dyspnea or cough. No chest discomfort, dizziness or palpitations. No diarrhea, abdominal pain or rectal bleeding.   No acute problems with vision or speech, lateralizing weakness or paresthesias.    ROS: as above or negative for Respiratory, CV, GI, endocrine, neuro systems.     Vitals: /76 (BP Location: Left arm, Patient Position: Sitting, Cuff Size: Adult Large)   Pulse 72   Temp 98.3  F (36.8  C) (Oral)   Resp 15   Ht 1.499 m (4' 11\")   Wt 78 kg (172 lb)   SpO2 99%   BMI 34.74 kg/m    BMI= Body mass index is 34.74 kg/m .   Physical Exam   GENERAL: healthy, alert and no distress  RESP: lungs clear to auscultation - no rales, rhonchi or wheezes  CV: regular rate and rhythm, normal S1 S2, no S3 or S4, no murmur, click or rub, no peripheral edema and peripheral pulses strong  MS: no gross musculoskeletal defects noted, no edema    Diagnostic Test Results:  Labs reviewed in Epic        Assessment & Plan     (E11.21) Type 2 diabetes mellitus with diabetic nephropathy, without long-term current use of insulin (H)  (primary encounter diagnosis)  Comment: A1c just a bit above target. Advised making no medication changes at this time (patient quite " "resistant to considering any changes).   Urged him to reduce carbohydrate portions.   Plan: TSH with free T4 reflex, Albumin Random Urine         Quantitative with Creat Ratio, **A1C FUTURE 3mo          (E78.5) Hyperlipidemia LDL goal <100  Comment: LDL at target. Continue current meds.     (I10) Essential hypertension with goal blood pressure less than 140/90  Comment: BP at target. Continue current meds.      BMI:   Estimated body mass index is 34.74 kg/m  as calculated from the following:    Height as of this encounter: 1.499 m (4' 11\").    Weight as of this encounter: 78 kg (172 lb).   Weight management plan: Discussed healthy diet and exercise guidelines    Patient Instructions   No medication changes.   Keep up your walking every day, and watch your amounts of bread, rice and pasta.     See me in September 2019 (three months), with labs a few days in advance.      Liam Esquivel MD,   [facility]        "

## 2019-06-17 DIAGNOSIS — E11.21 TYPE 2 DIABETES MELLITUS WITH DIABETIC NEPHROPATHY, WITHOUT LONG-TERM CURRENT USE OF INSULIN (H): ICD-10-CM

## 2019-06-17 DIAGNOSIS — I10 ESSENTIAL HYPERTENSION WITH GOAL BLOOD PRESSURE LESS THAN 140/90: ICD-10-CM

## 2019-06-18 NOTE — TELEPHONE ENCOUNTER
"Requested Prescriptions   Pending Prescriptions Disp Refills     losartan (COZAAR) 50 MG tablet [Pharmacy Med Name: LOSARTAN 50MG  Last Written Prescription Date:  3/13/2019  Last Fill Quantity: 90,  # refills: 3   Last office visit: 6/13/2019 with prescribing provider:     Future Office Visit:   TABLETS] 90 tablet 0     Sig: TAKE 1 TABLET(50 MG) BY MOUTH DAILY       Angiotensin-II Receptors Passed - 6/17/2019  4:10 PM        Passed - Blood pressure under 140/90 in past 12 months     BP Readings from Last 3 Encounters:   06/13/19 136/76   03/13/19 130/78   12/12/18 130/74                 Passed - Recent (12 mo) or future (30 days) visit within the authorizing provider's specialty     Patient had office visit in the last 12 months or has a visit in the next 30 days with authorizing provider or within the authorizing provider's specialty.  See \"Patient Info\" tab in inbasket, or \"Choose Columns\" in Meds & Orders section of the refill encounter.              Passed - Medication is active on med list        Passed - Patient is age 18 or older        Passed - Normal serum creatinine on file in past 12 months     Recent Labs   Lab Test 03/05/19  0828   CR 0.90             Passed - Normal serum potassium on file in past 12 months     Recent Labs   Lab Test 03/05/19  0828   POTASSIUM 3.6                    "

## 2019-06-19 ENCOUNTER — PATIENT OUTREACH (OUTPATIENT)
Dept: GERIATRIC MEDICINE | Facility: CLINIC | Age: 79
End: 2019-06-19

## 2019-06-19 RX ORDER — LOSARTAN POTASSIUM 50 MG/1
TABLET ORAL
Qty: 90 TABLET | Refills: 0 | OUTPATIENT
Start: 2019-06-19

## 2019-06-19 NOTE — PROGRESS NOTES
[facility] Care Coordination Contact    Rec'd notification from [employer] that member's MA will end 6/30/19 because 12 month  MA renewal not rec'd.  Call placed to [employer] worker to f/u. Alok states that he rec'd a call from member's A-rep that they did not receive the annual renewal and a new form was just mailed on 6/17/19.  CC to follow.  Emerita Pineda RN, BC  Manager [facility] Care Coordinator   [phone]  (Fax)

## 2019-06-20 NOTE — PROGRESS NOTES
[facility] Care Coordination Contact    6/19/19 Rec'd vm from member stating that he did not receive the paperwork and would like CC to call Alok @ [facility]  6/20/19 call placed to member, shared that CC spoke with Alok and he reported that he mailed the paperwork on Monday. Chip will call CC back on 6/24/19 if paperwork not rec'd.  Chip requests that Alok mail all paperwork to him and not his son's home.  Call placed to Alok Anaya to inform.   Emerita Pineda RN, BC  Manager [facility] Care Coordinator   [phone]  (Fax)

## 2019-07-03 ENCOUNTER — PATIENT OUTREACH (OUTPATIENT)
Dept: GERIATRIC MEDICINE | Facility: CLINIC | Age: 79
End: 2019-07-03

## 2019-07-03 NOTE — PROGRESS NOTES
[facility] Care Coordination Contact    Rec'd notice that member is not active with MA.  Call placed to [employer] worker to inquire if he has rec'd members MA renewal application.  Emerita Pineda RN, BC  Manager [facility] Care Coordinator   [phone]  (Fax)

## 2019-07-08 ENCOUNTER — PATIENT OUTREACH (OUTPATIENT)
Dept: GERIATRIC MEDICINE | Facility: CLINIC | Age: 79
End: 2019-07-08

## 2019-07-08 NOTE — PROGRESS NOTES
[facility] Care Coordination Contact    Rec'd vm from [employer] Worker reporting that MA paperwork has been received and approved.  Call placed to member to inform.  Emerita Pineda RN, BC  Manager [facility] Care Coordinator   [phone]  (Fax)

## 2019-07-08 NOTE — PROGRESS NOTES
[facility] Care Coordination Contact    Call placed to member to schedule annual assessment.  Visit scheduled for 7/23/19 @ 12 noon. Member's preference is [employer] .  Call placed to Ramone, she states that she is available.  Call placed to GULSHAN to request above .  Emerita Pineda RN, BC  Manager [facility] Care Coordinator   [phone]  (Fax)

## 2019-07-23 ENCOUNTER — PATIENT OUTREACH (OUTPATIENT)
Dept: GERIATRIC MEDICINE | Facility: CLINIC | Age: 79
End: 2019-07-23

## 2019-07-23 ASSESSMENT — ACTIVITIES OF DAILY LIVING (ADL): DEPENDENT_IADLS:: CLEANING;LAUNDRY;SHOPPING

## 2019-07-23 NOTE — PROGRESS NOTES
[facility] Care Coordination Contact    [facility] Home Visit Assessment     Home visit for Health Risk Assessment with Chip CLIFFORD Rimma completed on July 23, 2019    Type of residence:: Apartment  Current living arrangement:: I live in a private home with family. The apt is one bedroom, member sleeps on a bed in the living area. The apt is unkept, soiled carpet. Member states that he is responsible to clean the apt, stating that he has a .     Assessment completed with:: Patient and [employer] .     Current Care Plan  Member currently receiving the following home care services:   N/A   Member currently receiving the following community resources: [facility], Transportation Services, [facility]      Medication Review  Medication reconciliation completed in Epic: Yes  Medication set-up & administration: Independent and sets up on own weekly.  Self-administers medications.  Medication Risk Assessment Medication (1 or more, place referral to MTM): Chip states that he does not like to take Metformin, states that he takes 2 tablets in the morning and usually  1 tablet in the evening. Offered MTM. Chip declines at this time.   MTM Referral Placed: No: members choices    Mental/Behavioral Health   Depression Screening: See PHQ assessment flowsheet.   Mental health DX:: No      Mental Health Diagnosis: No  Mental Health Services: None: No further intervention needed at this time.    Falls Assessment:   Fallen 2 or more times in the past year?: No   Any fall with injury in the past year?: No    ADL/IADL Dependencies:   Dependent ADLs:: Ambulation-cane  Dependent IADLs:: Cleaning, Laundry, Shopping    [facility] benefits: Shared information re: Silver Sneakers/gym memberships, ASA, Calcium +D.    PCA Assessment completed at visit: No     Elderly Waiver Eligibility: Yes-will continue on EW    Care Plan & Recommendations:   Chip is requesting assistance with scheduling a  dental appt for new dentures Chip would like assistance with scheduling an eye exam, last appt 11/27/18  Enc'd scheduling a Wellness Annual Exam, Chip in agreement.   7/24/19 Call placed to PCC, changed DM f/u appt to Annual exam on 9/20/19 Call placed to member to inform.     See CC for detailed assessment information.    Follow-Up Plan: Member informed of future contact, plan to f/u with member with a 6 month telephone assessment.  Contact information shared with member and family, encouraged member to call with any questions or concerns at any time.    [facility] continuum providers: Please refer to Health Care Home on the Epic Problem List to view this patient's [facility] Care Plan Summary.  MMIS completed, Rate Cell B, Case Mix L. Care Plan completed.  Emerita Pineda RN, BC  Manager [facility] Care Coordinator   [phone]  (Fax)

## 2019-08-01 ENCOUNTER — PATIENT OUTREACH (OUTPATIENT)
Dept: GERIATRIC MEDICINE | Facility: CLINIC | Age: 79
End: 2019-08-01

## 2019-08-01 NOTE — LETTER
August 1, 2019      CHIP DUMONT  [address] [place] [id number]      Dear Chip:    At [facility], we are dedicated to improving your health and well-being. Enclosed is the Comprehensive Care Plan that we developed with you on 7/23/2019. Please review the Care Plan carefully.    As a reminder, some of the things we discussed at your visit include:    Your physical and mental health    Ways to reduce falls    Health care needs you may have    Don t forget to contact your care coordinator if you:    Have been hospitalized or plan to be hospitalized     Have had a fall     Have experienced a change in physical health    Are experiencing emotional problems     If you do not agree with your Care Plan, have questions about it, or have experienced a change in your needs, please call me at [phone]. If you are hearing impaired, please call the [place] [employer] at 360 or [phone] (krzxdo-lb-diesgw relay service).    Sincerely,    Emerita Pineda RN    E-mail: [email or web address]  Phone: [phone]      [facility] is a health plan that contracts with both Medicare and the [place] Medical Assistance (Medicaid) program to provide benefits of both programs to enrollees. Enrollment in [facility] depends on contract renewal.    [id number])     [id number] (11/18)

## 2019-08-01 NOTE — PROGRESS NOTES
[facility] Care Coordination Contact    Received after visit chart from care coordinator.  Completed following tasks: Mailed copy of care plan to client, Updated services in access and Submitted referrals/auths for lifeline  Chart was returned to CC.    and Provider Signature - No POC Shared:  Member indicates that they do not want their POC shared with any EW providers.     Scheduled the following appts:  Thursday, August 15 at 10:00am (arrival time 9:45am)  [facility] [address] [address]  [phone]    Monday, December 5 at 10:15am (arrival time 10:00am)  [facility] - Dr. MELINA Guerrero  [address]  [place] [address]  [phone]      Landy Holm  Care Management Specialist  [facility]  [phone]

## 2019-08-07 ENCOUNTER — PATIENT OUTREACH (OUTPATIENT)
Dept: GERIATRIC MEDICINE | Facility: CLINIC | Age: 79
End: 2019-08-07

## 2019-08-07 NOTE — PROGRESS NOTES
"[facility] Care Coordination Contact    8/6/19 Rec'd vm from member requesting a return call. Member states that he is no longer receiving his social security benefits, \"I have no money.\"  8/6/19 VM left with Alok, Financial Worker at [facility] requesting a return call regarding member.   8/6/19 Call placed to member to report that CC has a call to [facility] and will call him back with an .  8/7/19 Rec'd tele call from Alok, Alok states that he processed members renewal on 7/3/19 (late) to retro to 7/1/19. Alok states that member receives RSDI and SSI, but because of not completing the renewal timely, the RSDI has been deducted at this time, 135.50.  Alok states that QMB stopped and will be reinstated in a couple of months and social security will pay member back his premium. Alok states that member does not need to do anything, but it will take a couple of months for the , \"systems to talk together.\"   8/7/19 Call placed to member with an  to share above info.  Member expressed concern regarding paying his rent. Inquired if he could discuss with his family members and then pay them back when receives his back payment   CC provided information on emergency cash assistance, he would need to go to [facility] to apply in person. Member states that his children will be able to assist him.       Emerita Pineda RN, BC  Manager [facility] Care Coordinator   [phone]  (Fax)    "

## 2019-09-06 DIAGNOSIS — E11.21 TYPE 2 DIABETES MELLITUS WITH DIABETIC NEPHROPATHY, WITHOUT LONG-TERM CURRENT USE OF INSULIN (H): ICD-10-CM

## 2019-09-06 NOTE — TELEPHONE ENCOUNTER
Requested Prescriptions   Pending Prescriptions Disp Refills     metFORMIN (GLUCOPHAGE) 500 MG tablet [Pharmacy Med Name: METFORMIN  Last Written Prescription Date:  3/13/2019  Last Fill Quantity: 360,  # refills: 1   Last office visit: 6/13/2019 with prescribing provider:     Future Office Visit:   Next 5 appointments (look out 90 days)    Sep 20, 2019  2:00 PM CDT  PHYSICAL with Liam Esquivel MD  [facility] ([facility]) [address]  [facility] [id number] [phone]        500MG TABLETS] 360 tablet 0     Sig: TAKE 2 TABLETS BY MOUTH EVERY MORNING AND 1 TO 2 WITH EACH SUPPER.       Biguanide Agents Failed - 9/6/2019 12:58 PM        Failed - Patient has had a Microalbumin in the past 15 mos.     Recent Labs   Lab Test 06/01/18  0749   MICROL 116   UMALCR 81.69*             Failed - Patient has documented A1c within the specified period of time.     If HgbA1C is 8 or greater, it needs to be on file within the past 3 months.  If less than 8, must be on file within the past 6 months.     Recent Labs   Lab Test 06/06/19  0759   A1C 8.0*             Passed - Blood pressure less than 140/90 in past 6 months     BP Readings from Last 3 Encounters:   06/13/19 136/76   03/13/19 130/78   12/12/18 130/74                 Passed - Patient has documented LDL within the past 12 mos.     Recent Labs   Lab Test 03/05/19  0828   LDL 64             Passed - Patient is age 10 or older        Passed - Patient's CR is NOT>1.4 OR Patient's EGFR is NOT<45 within past 12 mos.     Recent Labs   Lab Test 03/05/19  0828   GFRESTIMATED 82   GFRESTBLACK >90       Recent Labs   Lab Test 03/05/19  0828   CR 0.90             Passed - Patient does NOT have a diagnosis of CHF.        Passed - Medication is active on med list        Passed - Recent (6 mo) or future (30 days) visit within the authorizing provider's specialty     Patient had office visit in the last 6 months or has a visit in the next  "30 days with authorizing provider or within the authorizing provider's specialty.  See \"Patient Info\" tab in inbasket, or \"Choose Columns\" in Meds & Orders section of the refill encounter.            "

## 2019-09-09 NOTE — TELEPHONE ENCOUNTER
Medication is being filled for 1 time refill only due to:  patient has an upcoming appointment     Next 5 appointments (look out 90 days)    Sep 20, 2019  2:00 PM CDT  PHYSICAL with Liam Esquivel MD  [facility] ([facility]) [address]  [place] [address]  [phone]

## 2019-09-13 DIAGNOSIS — E11.21 TYPE 2 DIABETES MELLITUS WITH DIABETIC NEPHROPATHY, WITHOUT LONG-TERM CURRENT USE OF INSULIN (H): ICD-10-CM

## 2019-09-13 LAB
HBA1C MFR BLD: 7.9 % (ref 0–5.6)
TSH SERPL DL<=0.005 MIU/L-ACNC: 1.2 MU/L (ref 0.4–4)

## 2019-09-13 PROCEDURE — 83036 HEMOGLOBIN GLYCOSYLATED A1C: CPT | Performed by: INTERNAL MEDICINE

## 2019-09-13 PROCEDURE — 36415 COLL VENOUS BLD VENIPUNCTURE: CPT | Performed by: INTERNAL MEDICINE

## 2019-09-13 PROCEDURE — 82043 UR ALBUMIN QUANTITATIVE: CPT | Performed by: INTERNAL MEDICINE

## 2019-09-13 PROCEDURE — 84443 ASSAY THYROID STIM HORMONE: CPT | Performed by: INTERNAL MEDICINE

## 2019-09-14 LAB
CREAT UR-MCNC: 150 MG/DL
MICROALBUMIN UR-MCNC: 459 MG/L
MICROALBUMIN/CREAT UR: 306 MG/G CR (ref 0–17)

## 2019-09-20 ENCOUNTER — OFFICE VISIT (OUTPATIENT)
Dept: INTERNAL MEDICINE | Facility: CLINIC | Age: 79
End: 2019-09-20
Payer: COMMERCIAL

## 2019-09-20 VITALS
TEMPERATURE: 98 F | RESPIRATION RATE: 18 BRPM | BODY MASS INDEX: 33.65 KG/M2 | SYSTOLIC BLOOD PRESSURE: 138 MMHG | DIASTOLIC BLOOD PRESSURE: 80 MMHG | HEART RATE: 77 BPM | WEIGHT: 171.4 LBS | HEIGHT: 60 IN | OXYGEN SATURATION: 96 %

## 2019-09-20 DIAGNOSIS — E78.5 HYPERLIPIDEMIA LDL GOAL <100: ICD-10-CM

## 2019-09-20 DIAGNOSIS — I10 ESSENTIAL HYPERTENSION WITH GOAL BLOOD PRESSURE LESS THAN 140/90: ICD-10-CM

## 2019-09-20 DIAGNOSIS — H92.01 RIGHT EAR PAIN: ICD-10-CM

## 2019-09-20 DIAGNOSIS — Z00.00 ENCOUNTER FOR MEDICARE ANNUAL WELLNESS EXAM: Primary | ICD-10-CM

## 2019-09-20 DIAGNOSIS — E11.21 TYPE 2 DIABETES MELLITUS WITH DIABETIC NEPHROPATHY, WITHOUT LONG-TERM CURRENT USE OF INSULIN (H): ICD-10-CM

## 2019-09-20 PROCEDURE — 99397 PER PM REEVAL EST PAT 65+ YR: CPT | Performed by: INTERNAL MEDICINE

## 2019-09-20 RX ORDER — LOSARTAN POTASSIUM 50 MG/1
TABLET ORAL
Qty: 90 TABLET | Refills: 1 | Status: SHIPPED | OUTPATIENT
Start: 2019-09-20 | End: 2020-02-26

## 2019-09-20 RX ORDER — NEOMYCIN SULFATE, POLYMYXIN B SULFATE AND HYDROCORTISONE 10; 3.5; 1 MG/ML; MG/ML; [USP'U]/ML
3 SUSPENSION/ DROPS AURICULAR (OTIC) 3 TIMES DAILY
Qty: 10 ML | Refills: 0 | Status: SHIPPED | OUTPATIENT
Start: 2019-09-20 | End: 2021-01-13

## 2019-09-20 SDOH — ECONOMIC STABILITY: TRANSPORTATION INSECURITY
IN THE PAST 12 MONTHS, HAS LACK OF TRANSPORTATION KEPT YOU FROM MEETINGS, WORK, OR FROM GETTING THINGS NEEDED FOR DAILY LIVING?: NO

## 2019-09-20 SDOH — HEALTH STABILITY: PHYSICAL HEALTH: ON AVERAGE, HOW MANY MINUTES DO YOU ENGAGE IN EXERCISE AT THIS LEVEL?: 30 MIN

## 2019-09-20 SDOH — SOCIAL STABILITY: SOCIAL NETWORK: IN A TYPICAL WEEK, HOW MANY TIMES DO YOU TALK ON THE PHONE WITH FAMILY, FRIENDS, OR NEIGHBORS?: THREE TIMES A WEEK

## 2019-09-20 SDOH — ECONOMIC STABILITY: FOOD INSECURITY: WITHIN THE PAST 12 MONTHS, THE FOOD YOU BOUGHT JUST DIDN'T LAST AND YOU DIDN'T HAVE MONEY TO GET MORE.: NEVER TRUE

## 2019-09-20 SDOH — ECONOMIC STABILITY: TRANSPORTATION INSECURITY
IN THE PAST 12 MONTHS, HAS THE LACK OF TRANSPORTATION KEPT YOU FROM MEDICAL APPOINTMENTS OR FROM GETTING MEDICATIONS?: NO

## 2019-09-20 SDOH — SOCIAL STABILITY: SOCIAL NETWORK
DO YOU BELONG TO ANY CLUBS OR ORGANIZATIONS SUCH AS CHURCH GROUPS UNIONS, FRATERNAL OR ATHLETIC GROUPS, OR SCHOOL GROUPS?: NO

## 2019-09-20 SDOH — HEALTH STABILITY: PHYSICAL HEALTH: ON AVERAGE, HOW MANY DAYS PER WEEK DO YOU ENGAGE IN MODERATE TO STRENUOUS EXERCISE (LIKE A BRISK WALK)?: 6 DAYS

## 2019-09-20 SDOH — SOCIAL STABILITY: SOCIAL NETWORK: HOW OFTEN DO YOU GET TOGETHER WITH FRIENDS OR RELATIVES?: THREE TIMES A WEEK

## 2019-09-20 SDOH — HEALTH STABILITY: MENTAL HEALTH: HOW OFTEN DO YOU HAVE A DRINK CONTAINING ALCOHOL?: NEVER

## 2019-09-20 SDOH — HEALTH STABILITY: MENTAL HEALTH
STRESS IS WHEN SOMEONE FEELS TENSE, NERVOUS, ANXIOUS, OR CAN'T SLEEP AT NIGHT BECAUSE THEIR MIND IS TROUBLED. HOW STRESSED ARE YOU?: NOT AT ALL

## 2019-09-20 SDOH — ECONOMIC STABILITY: FOOD INSECURITY: WITHIN THE PAST 12 MONTHS, YOU WORRIED THAT YOUR FOOD WOULD RUN OUT BEFORE YOU GOT MONEY TO BUY MORE.: NEVER TRUE

## 2019-09-20 SDOH — ECONOMIC STABILITY: INCOME INSECURITY: HOW HARD IS IT FOR YOU TO PAY FOR THE VERY BASICS LIKE FOOD, HOUSING, MEDICAL CARE, AND HEATING?: NOT HARD AT ALL

## 2019-09-20 SDOH — SOCIAL STABILITY: SOCIAL NETWORK: HOW OFTEN DO YOU ATTEND CHURCH OR RELIGIOUS SERVICES?: NEVER

## 2019-09-20 SDOH — SOCIAL STABILITY: SOCIAL NETWORK: ARE YOU MARRIED, WIDOWED, DIVORCED, SEPARATED, NEVER MARRIED, OR LIVING WITH A PARTNER?: SEPARATED

## 2019-09-20 SDOH — SOCIAL STABILITY: SOCIAL NETWORK: HOW OFTEN DO YOU ATTENT MEETINGS OF THE CLUB OR ORGANIZATION YOU BELONG TO?: NEVER

## 2019-09-20 ASSESSMENT — ENCOUNTER SYMPTOMS
NERVOUS/ANXIOUS: 0
EYE PAIN: 0
FREQUENCY: 1
NAUSEA: 0
HEADACHES: 0
HEARTBURN: 0
PARESTHESIAS: 0
HEMATOCHEZIA: 0
DYSURIA: 0
ARTHRALGIAS: 1
HEMATURIA: 0
DIZZINESS: 0
SHORTNESS OF BREATH: 0
SORE THROAT: 0
JOINT SWELLING: 0
MYALGIAS: 1
ABDOMINAL PAIN: 0
CONSTIPATION: 0
COUGH: 0
DIARRHEA: 0
PALPITATIONS: 0
WEAKNESS: 0
CHILLS: 0
FEVER: 1

## 2019-09-20 ASSESSMENT — MIFFLIN-ST. JEOR: SCORE: 1337.03

## 2019-09-20 ASSESSMENT — ACTIVITIES OF DAILY LIVING (ADL): CURRENT_FUNCTION: NO ASSISTANCE NEEDED

## 2019-09-20 NOTE — PROGRESS NOTES
"SUBJECTIVE:   Chip Shanks is a 79 year old male who presents for Preventive Visit.    Are you in the first 12 months of your Medicare coverage?  No    Healthy Habits:     In general, how would you rate your overall health?  Fair    Frequency of exercise:  6-7 days/week    Duration of exercise:  15-30 minutes    Do you usually eat at least 4 servings of fruit and vegetables a day, include whole grains    & fiber and avoid regularly eating high fat or \"junk\" foods?  Yes    Taking medications regularly:  Yes    Medication side effects:  None    Ability to successfully perform activities of daily living:  No assistance needed    Home Safety:  No safety concerns identified    Hearing Impairment:  No hearing concerns    In the past 6 months, have you been bothered by leaking of urine?  No    In general, how would you rate your overall mental or emotional health?  Excellent      PHQ-2 Total Score: 0    Additional concerns today:  Yes    Do you feel safe in your environment? Yes    Do you have a Health Care Directive? Yes: Advance Directive has been received and scanned.    Fall risk  Fallen 2 or more times in the past year?: No  Any fall with injury in the past year?: No    Cognitive Screening   1) Repeat 3 items (Leader, Season, Table)    2) Clock draw: NORMAL   3) 3 item recall: Recalls 3 objects  Results: NORMAL clock ok, 3 items recalled:     Mini-CogTM Copyright SALLY Parks. Licensed by the author for use in [facility]; reprinted with permission (gladys@.[facility]). All rights reserved.      Do you have sleep apnea, excessive snoring or daytime drowsiness?: yes    Patient present with interpretor due to language barrier.     Right ear pain  Last night he had a fever and right ear pain. He used a Q-tip to gently clean his ear and noticed there was blood on it. At the moment he reports no ear pain.     Past/recent records reviewed and discussed for:  -Reviewed and updated medical hx, medications, social hx, " family hx, and immunizations  -Reviewed labs from 9/13/2019 with patient  -Patient complains of right calf pain, ongoing for 4 days. He states it is exercise related-- he's been using the treadmill.     Reviewed and updated as needed this visit by clinical staff  Tobacco  Allergies  Meds  Problems  Med Hx  Surg Hx  Fam Hx  Soc Hx          Reviewed and updated as needed this visit by Provider  Tobacco  Allergies  Meds  Problems  Soc Hx       Social History     Tobacco Use     Smoking status: Never Smoker     Smokeless tobacco: Never Used   Substance Use Topics     Alcohol use: No     Frequency: Never     If you drink alcohol do you typically have >3 drinks per day or >7 drinks per week? No    Alcohol Use 9/20/2019   Prescreen: >3 drinks/day or >7 drinks/week? Not Applicable   Prescreen: >3 drinks/day or >7 drinks/week? -   No flowsheet data found.    Current providers sharing in care for this patient include:   Patient Care Team:  Liam Esquivel MD as PCP - General  Liam Esquivel MD as Assigned PCP  Alejandra Pineda RN as Lead Care Coordinator  Ashlyn Becerril as Other (see comments)  Dulce Maria Ulrich    The following health maintenance items are reviewed in Epic and correct as of today:  Health Maintenance   Topic Date Due     ZOSTER IMMUNIZATION (1 of 2) 04/01/1991     MEDICARE ANNUAL WELLNESS VISIT  09/26/2006     PNEUMOCOCCAL IMMUNIZATION 65+ LOW/MEDIUM RISK (2 of 2 - PCV13) 09/06/2008     DIABETIC FOOT EXAM  05/31/2018     INFLUENZA VACCINE (1) 09/01/2019     EYE EXAM  11/01/2019     BMP  03/05/2020     LIPID  03/05/2020     A1C  03/13/2020     FALL RISK ASSESSMENT  07/23/2020     MICROALBUMIN  09/13/2020     TSH W/FREE T4 REFLEX  09/13/2021     ADVANCE CARE PLANNING  09/21/2022     DTAP/TDAP/TD IMMUNIZATION (3 - Td) 01/22/2023     PHQ-2  Completed     IPV IMMUNIZATION  Aged Out     MENINGITIS IMMUNIZATION  Aged Out     Labs reviewed in EPIC    Review of Systems   Constitutional: Positive for  "fever. Negative for chills.   HENT: Positive for ear pain (right ear). Negative for congestion, hearing loss and sore throat.    Eyes: Negative for pain and visual disturbance.   Respiratory: Negative for cough and shortness of breath.    Cardiovascular: Negative for chest pain, palpitations and peripheral edema.   Gastrointestinal: Negative for abdominal pain, constipation, diarrhea, heartburn, hematochezia and nausea.   Genitourinary: Positive for frequency. Negative for discharge, dysuria, genital sores, hematuria, impotence and urgency.   Musculoskeletal: Positive for arthralgias and myalgias (right calf). Negative for joint swelling.   Skin: Negative for rash.   Neurological: Negative for dizziness, weakness, headaches and paresthesias.   Psychiatric/Behavioral: Negative for mood changes. The patient is not nervous/anxious.      This document serves as a record of the services and decisions personally performed and made by Liam Esquivel MD. It was created on his behalf by Becky Gomez, a trained medical scribe. The creation of this document is based on the provider's statements to the medical scribe.  Becky Gomez September 20, 2019 2:53 PM     OBJECTIVE:   /80 (BP Location: Right arm, Patient Position: Sitting, Cuff Size: Adult Large)   Pulse 77   Temp 98  F (36.7  C) (Oral)   Resp 18   Ht 1.511 m (4' 11.5\")   Wt 77.7 kg (171 lb 6.4 oz)   SpO2 96%   BMI 34.04 kg/m   Estimated body mass index is 34.04 kg/m  as calculated from the following:    Height as of this encounter: 1.511 m (4' 11.5\").    Weight as of this encounter: 77.7 kg (171 lb 6.4 oz).     Physical Exam  GENERAL: healthy, alert and no distress  EYES: Eyes grossly normal to inspection, PERRL and conjunctivae and sclerae normal  HENT: ear canals and TM's normal, nose and mouth without ulcers or lesions  NECK: no adenopathy, no asymmetry, masses, or scars and thyroid normal to palpation  RESP: lungs clear to auscultation - no rales, " rhonchi or wheezes  CV: regular rate and rhythm, normal S1 S2, no S3 or S4, no murmur, click or rub, no peripheral edema and peripheral pulses strong  ABDOMEN: soft, nontender, no hepatosplenomegaly, no masses and bowel sounds normal   (male): normal male genitalia without lesions or urethral discharge, no hernia  RECTAL: normal sphincter tone, no rectal masses, prostate normal size, smooth, nontender without nodules or masses  MS: no gross musculoskeletal defects noted, no edema  SKIN: no suspicious lesions or rashes  NEURO: Normal strength and tone, mentation intact and speech normal  PSYCH: mentation appears normal, affect normal/bright    Diagnostic Test Results:  Labs reviewed in Epic    ASSESSMENT / PLAN:   (Z00.00) Encounter for Medicare annual wellness exam  (primary encounter diagnosis)  Comment: Stable health. See epic orders.    (E11.21) Type 2 diabetes mellitus with diabetic nephropathy, without long-term current use of insulin (H)  Comment: A1c at target. Continue current measures.  Plan: losartan (COZAAR) 50 MG tablet          (I10) Essential hypertension with goal blood pressure less than 140/90  Comment: BP at target. Continue current meds.  Plan: losartan (COZAAR) 50 MG tablet          (H92.01) Right ear pain  Comment: Normal ear exam, but still offered rx   Plan: neomycin-polymyxin-hydrocortisone (CORTISPORIN)        [id number] otic suspension            Everything looks fine!    Refills of Losartan have been faxed to your pharmacy.     See me for a diabetes check in four months, with fasting labs a week in advance.       End of Life Planning:  Patient currently has an advanced directive: Yes.  Practitioner is supportive of decision.    COUNSELING:  Reviewed preventive health counseling, as reflected in patient instructions       Regular exercise       Healthy diet/nutrition       Prostate cancer screening    Estimated body mass index is 34.04 kg/m  as calculated from the following:    Height as  "of this encounter: 1.511 m (4' 11.5\").    Weight as of this encounter: 77.7 kg (171 lb 6.4 oz).  Weight management plan: Discussed healthy diet and exercise guidelines   reports that he has never smoked. He has never used smokeless tobacco.    Appropriate preventive services were discussed with this patient, including applicable screening as appropriate for cardiovascular disease, diabetes, osteopenia/osteoporosis, and glaucoma.  As appropriate for age/gender, discussed screening for colorectal cancer, prostate cancer, breast cancer, and cervical cancer. Checklist reviewing preventive services available has been given to the patient.    Reviewed patients plan of care and provided an AVS. The Basic Care Plan (routine screening as documented in Health Maintenance) for Chip meets the Care Plan requirement. This Care Plan has been established and reviewed with the Patient and interpretor.    Counseling Resources:  ATP IV Guidelines  Pooled Cohorts Equation Calculator  Breast Cancer Risk Calculator  FRAX Risk Assessment  ICSI Preventive Guidelines  Dietary Guidelines for Americans, 2010  USDA's MyPlate  ASA Prophylaxis  Lung CA Screening      The information in this document, created by the medical scribe for me, accurately reflects the services I personally performed and the decisions made by me. I have reviewed and approved this document for accuracy prior to leaving the patient care area.  September 20, 2019 3:14 PM    Liam Esquivel MD,   [facility]    Identified Health Risks:  "

## 2019-09-20 NOTE — LETTER
"  [facility]  [address] [place] [address]  [phone]    9/20/2019    Chip Shanks  [address]   [facility] [address]           Dear Mr. Pattonnyasia,    The results of your lab tests are enclosed. Everything looks fine. Unless noted otherwise below, any results that are outside the \"normal\" range are within acceptable limits and are of no concern.    Hemoglobin A1C measures control of diabetes. Your Hemoglobin A1C was 7.9, with the target being under 8.0.     The results of your recent urine test showed an elevation in microalbumin, an early indicator of ill-effects to the kidney from diabetes. You are already taking the standard treatment for this situation, which is an ARB medication like Losartan.     TSH measures thyroid function. Yours is normal.     If you have any further questions or problems, please contact our office.    Sincerely,        Liam Esquivel MD  Attachment: Lab results      "

## 2019-09-20 NOTE — PATIENT INSTRUCTIONS
Patient Education           Everything looks fine!    Refills of Losartan have been faxed to your pharmacy.     See me for a diabetes check in four months, with fasting labs a week in advance.

## 2019-12-16 DIAGNOSIS — E11.21 TYPE 2 DIABETES MELLITUS WITH DIABETIC NEPHROPATHY, WITHOUT LONG-TERM CURRENT USE OF INSULIN (H): ICD-10-CM

## 2019-12-16 NOTE — TELEPHONE ENCOUNTER
Requested Prescriptions   Pending Prescriptions Disp Refills     metFORMIN (GLUCOPHAGE) 500 MG tablet [Pharmacy Med Name: METFORMIN 500MG TABLETS] 360 tablet 0     Sig: TAKE 2 TABLETS BY MOUTH EVERY MORNING AND 1 TO 2 WITH EACH SUPPER.   Last Written Prescription Date:  09/09/2019  Last Fill Quantity: 360,  # refills: 0   Last office visit: 9/20/2019 with prescribing provider:     Future Office Visit:   Next 5 appointments (look out 90 days)    Jan 22, 2020  2:00 PM CST  SHORT with Liam Esquivel MD  [facility] ([facility]) [address]  [facility] [address]  [phone]           Biguanide Agents Passed - 12/16/2019 11:45 AM        Passed - Blood pressure less than 140/90 in past 6 months     BP Readings from Last 3 Encounters:   09/20/19 138/80   06/13/19 136/76   03/13/19 130/78                 Passed - Patient has documented LDL within the past 12 mos.     Recent Labs   Lab Test 03/05/19  0828   LDL 64             Passed - Patient has had a Microalbumin in the past 15 mos.     Recent Labs   Lab Test 09/13/19  0815   MICROL 459   UMALCR 306.00*             Passed - Patient is age 10 or older        Passed - Patient has documented A1c within the specified period of time.     If HgbA1C is 8 or greater, it needs to be on file within the past 3 months.  If less than 8, must be on file within the past 6 months.     Recent Labs   Lab Test 09/13/19  0815   A1C 7.9*             Passed - Patient's CR is NOT>1.4 OR Patient's EGFR is NOT<45 within past 12 mos.     Recent Labs   Lab Test 03/05/19  0828   GFRESTIMATED 82   GFRESTBLACK >90       Recent Labs   Lab Test 03/05/19  0828   CR 0.90             Passed - Patient does NOT have a diagnosis of CHF.        Passed - Medication is active on med list        Passed - Recent (6 mo) or future (30 days) visit within the authorizing provider's specialty     Patient had office visit in the last 6 months or has a visit in the next  "30 days with authorizing provider or within the authorizing provider's specialty.  See \"Patient Info\" tab in inbasket, or \"Choose Columns\" in Meds & Orders section of the refill encounter.            "

## 2020-01-10 ENCOUNTER — PATIENT OUTREACH (OUTPATIENT)
Dept: GERIATRIC MEDICINE | Facility: CLINIC | Age: 80
End: 2020-01-10

## 2020-01-10 NOTE — PROGRESS NOTES
[facility] Care Coordination Contact    Called member to complete six month assessment and left a message requesting a return call.  Emerita Pineda RN, BC  Manager [facility] Care Coordinator   [phone]  (Fax)

## 2020-01-15 NOTE — PROGRESS NOTES
[facility] Care Coordination Contact    1/15/2020 Second message left with member to complete 6 month telephone assessment. Request a call back ( used)  Emerita Pineda RN, BC  Manager [facility] Care Coordinator   [phone]  (Fax)

## 2020-01-21 NOTE — PROGRESS NOTES
[facility] Care Coordination Contact    Third attempt to reach member to complete 6 month telephone assessment, left vm requesting a return call  Emerita Pineda RN, BC  Manager [facility] Care Coordinator   [phone]  (Fax)

## 2020-02-03 NOTE — PROGRESS NOTES
[facility] Care Coordination Contact      [facility] Six-Month Telephone Assessment    6 month telephone assessment completed on 2/3/2020  Rec'd telephone call from member, per member's request, obtained .  Member states that he is doing well, states that he has no concerns.      ER visits: No  Hospitalizations: No  TCU stays: No  Significant health status changes: None reported   Falls/Injuries: No  ADL/IADL changes: No  Changes in services: No    Caregiver Assessment follow up:  NA     Goals: See POC in chart for goal progress documentation.    CC will assist with scheduling a dental appt     Will see member in 6 months for an annual health risk assessment.   Encouraged member to call CC with any questions or concerns in the meantime.   Emerita Pineda RN, BC  Manager [facility] Care Coordinator   [phone]  (Fax)

## 2020-02-04 ENCOUNTER — PATIENT OUTREACH (OUTPATIENT)
Dept: GERIATRIC MEDICINE | Facility: CLINIC | Age: 80
End: 2020-02-04

## 2020-02-04 NOTE — PROGRESS NOTES
[facility] Care Coordination Contact    Per CC request for Dental Appt.    Called [facility] and was directed to [facility] for Appt.    Called and scheduled Appt for member for Tuesday Feb 11th at Noon.  Member will need to call and confirm appointment by Monday Feb 10th or appt will be canceled due to Member being prior no show.    [facility] [address] [address]  [phone]    Calli Clark  Care Management Specialist  [facility]  [phone]

## 2020-02-05 NOTE — PROGRESS NOTES
[facility] Care Coordination Contact    2/5/2020 Call placed to member with an  to provide information below  Member states that he does not need transportation, provided clinic's address.  Call placed to dental clinic with member and  to confirm appointment and request an .  Emerita Pineda RN, BC  Manager [facility] Care Coordinator   [phone]  (Fax)

## 2020-02-15 DIAGNOSIS — I10 ESSENTIAL HYPERTENSION WITH GOAL BLOOD PRESSURE LESS THAN 140/90: ICD-10-CM

## 2020-02-17 RX ORDER — ATENOLOL 50 MG/1
TABLET ORAL
Qty: 90 TABLET | Refills: 0 | Status: SHIPPED | OUTPATIENT
Start: 2020-02-17 | End: 2020-05-12

## 2020-02-17 NOTE — TELEPHONE ENCOUNTER
Medication is being filled for 1 time refill only due to:  pt has a future appt scheduled.     Next 5 appointments (look out 90 days)    Feb 26, 2020 11:20 AM CST  SHORT with Liam Esquivel MD  [facility] ([facility]) [address]  [facility] [address]  [phone]

## 2020-02-17 NOTE — TELEPHONE ENCOUNTER
"Requested Prescriptions   Pending Prescriptions Disp Refills     atenolol (TENORMIN) 50 MG tablet [Pharmacy Med Name: ATENOLOL 50MG  Last Written Prescription Date:  3/13/19  Last Fill Quantity: 90,  # refills: 3   Last Office Visit: 9/20/2019   Future Office Visit:    Next 5 appointments (look out 90 days)    Feb 26, 2020 11:20 AM CST  SHORT with Liam Esquivel MD  [facility] ([facility]) [address]  [facility] [address]  [phone]          TABLETS] 90 tablet 3     Sig: TAKE 1 TABLET(50 MG) BY MOUTH DAILY       Beta-Blockers Protocol Passed - 2/15/2020 12:59 PM        Passed - Blood pressure under 140/90 in past 12 months     BP Readings from Last 3 Encounters:   09/20/19 138/80   06/13/19 136/76   03/13/19 130/78                 Passed - Patient is age 6 or older        Passed - Recent (12 mo) or future (30 days) visit within the authorizing provider's specialty     Patient has had an office visit with the authorizing provider or a provider within the authorizing providers department within the previous 12 mos or has a future within next 30 days. See \"Patient Info\" tab in inbasket, or \"Choose Columns\" in Meds & Orders section of the refill encounter.              Passed - Medication is active on med list          "

## 2020-02-19 DIAGNOSIS — E11.21 TYPE 2 DIABETES MELLITUS WITH DIABETIC NEPHROPATHY, WITHOUT LONG-TERM CURRENT USE OF INSULIN (H): ICD-10-CM

## 2020-02-19 DIAGNOSIS — E78.5 HYPERLIPIDEMIA LDL GOAL <100: ICD-10-CM

## 2020-02-19 LAB
ALBUMIN SERPL-MCNC: 3.5 G/DL (ref 3.4–5)
ALP SERPL-CCNC: 46 U/L (ref 40–150)
ALT SERPL W P-5'-P-CCNC: 52 U/L (ref 0–70)
ANION GAP SERPL CALCULATED.3IONS-SCNC: 4 MMOL/L (ref 3–14)
AST SERPL W P-5'-P-CCNC: 42 U/L (ref 0–45)
BILIRUB SERPL-MCNC: 0.8 MG/DL (ref 0.2–1.3)
BUN SERPL-MCNC: 11 MG/DL (ref 7–30)
CALCIUM SERPL-MCNC: 9 MG/DL (ref 8.5–10.1)
CHLORIDE SERPL-SCNC: 104 MMOL/L (ref 94–109)
CHOLEST SERPL-MCNC: 126 MG/DL
CO2 SERPL-SCNC: 28 MMOL/L (ref 20–32)
CREAT SERPL-MCNC: 0.9 MG/DL (ref 0.66–1.25)
GFR SERPL CREATININE-BSD FRML MDRD: 81 ML/MIN/{1.73_M2}
GLUCOSE SERPL-MCNC: 133 MG/DL (ref 70–99)
HBA1C MFR BLD: 8.5 % (ref 0–5.6)
HDLC SERPL-MCNC: 38 MG/DL
LDLC SERPL CALC-MCNC: 58 MG/DL
NONHDLC SERPL-MCNC: 88 MG/DL
POTASSIUM SERPL-SCNC: 4.2 MMOL/L (ref 3.4–5.3)
PROT SERPL-MCNC: 8.4 G/DL (ref 6.8–8.8)
SODIUM SERPL-SCNC: 136 MMOL/L (ref 133–144)
TRIGL SERPL-MCNC: 149 MG/DL

## 2020-02-19 PROCEDURE — 80053 COMPREHEN METABOLIC PANEL: CPT | Performed by: INTERNAL MEDICINE

## 2020-02-19 PROCEDURE — 36415 COLL VENOUS BLD VENIPUNCTURE: CPT | Performed by: INTERNAL MEDICINE

## 2020-02-19 PROCEDURE — 83036 HEMOGLOBIN GLYCOSYLATED A1C: CPT | Performed by: INTERNAL MEDICINE

## 2020-02-19 PROCEDURE — 80061 LIPID PANEL: CPT | Performed by: INTERNAL MEDICINE

## 2020-02-26 ENCOUNTER — OFFICE VISIT (OUTPATIENT)
Dept: INTERNAL MEDICINE | Facility: CLINIC | Age: 80
End: 2020-02-26
Payer: COMMERCIAL

## 2020-02-26 VITALS
DIASTOLIC BLOOD PRESSURE: 72 MMHG | BODY MASS INDEX: 33.34 KG/M2 | WEIGHT: 169.8 LBS | HEART RATE: 83 BPM | TEMPERATURE: 97.9 F | HEIGHT: 60 IN | SYSTOLIC BLOOD PRESSURE: 138 MMHG | OXYGEN SATURATION: 100 % | RESPIRATION RATE: 18 BRPM

## 2020-02-26 DIAGNOSIS — I10 ESSENTIAL HYPERTENSION WITH GOAL BLOOD PRESSURE LESS THAN 140/90: ICD-10-CM

## 2020-02-26 DIAGNOSIS — E11.21 TYPE 2 DIABETES MELLITUS WITH DIABETIC NEPHROPATHY, WITHOUT LONG-TERM CURRENT USE OF INSULIN (H): Primary | ICD-10-CM

## 2020-02-26 DIAGNOSIS — E78.5 HYPERLIPIDEMIA LDL GOAL <100: ICD-10-CM

## 2020-02-26 PROCEDURE — 99214 OFFICE O/P EST MOD 30 MIN: CPT | Performed by: INTERNAL MEDICINE

## 2020-02-26 RX ORDER — LOSARTAN POTASSIUM 100 MG/1
100 TABLET ORAL DAILY
Qty: 90 TABLET | Refills: 1 | Status: SHIPPED | OUTPATIENT
Start: 2020-02-26 | End: 2020-07-23

## 2020-02-26 ASSESSMENT — MIFFLIN-ST. JEOR: SCORE: 1329.77

## 2020-02-26 NOTE — PROGRESS NOTES
Subjective     Chip Shanks is a 79 year old male who presents to clinic today for the following health issues:    Patient is being seen for a Diabetic Follow up ( HTN, LDL, DM).    Patient present with interpretor.     HPI   Diabetes Follow-up    How often are you checking your blood sugar? One time daily  What time of day are you checking your blood sugars (select all that apply)?  Before meals  Have you had any blood sugars above 200?  No  Have you had any blood sugars below 70?  No    What symptoms do you notice when your blood sugar is low?  None    What concerns do you have today about your diabetes? None     Do you have any of these symptoms? (Select all that apply)  No numbness or tingling in feet.  No redness, sores or blisters on feet.  No complaints of excessive thirst.  No reports of blurry vision.  No significant changes to weight.    Have you had a diabetic eye exam in the last 12 months? Yes- Date of last eye exam: [facility],  Location: 2020 This year    Lab Results   Component Value Date    A1C 8.5 02/19/2020    A1C 7.9 09/13/2019    A1C 8.0 06/06/2019    A1C 8.3 03/05/2019    A1C 7.9 12/07/2018     Patient checks glucose levels almost daily. AM fasting levels range  mg/dL. Notes that he's recently started monitoring his diet and glucose levels were higher last fall. He walks outside daily. He is currently taking metformin 1,000 mg twice daily. Recall that glipizide was discontinued in the past due to hypoglycemia.     Hyperlipidemia Follow-Up      Are you regularly taking any medication or supplement to lower your cholesterol?   Yes- Simvastatin    Are you having muscle aches or other side effects that you think could be caused by your cholesterol lowering medication?  No    Recent Labs   Lab Test 02/19/20  0758 03/05/19  0828  10/07/15  0814 06/12/15  0807   CHOL 126 132   < > 196 160   HDL 38* 45   < > 43 42   LDL 58 64   < > 123 83   TRIG 149 114   < > 151* 174*   CHOLHDLRATIO  --   --   " --  4.6 3.8    < > = values in this interval not displayed.     LDL managed with simvastatin 40 mg daily.     Hypertension Follow-up      Do you check your blood pressure regularly outside of the clinic? Yes     Are you following a low salt diet? Yes    Are your blood pressures ever more than 140 on the top number (systolic) OR more   than 90 on the bottom number (diastolic), for example 140/90? No    How many servings of fruits and vegetables do you eat daily?  2-3    On average, how many sweetened beverages do you drink each day (Examples: soda, juice, sweet tea, etc.  Do NOT count diet or artificially sweetened beverages)?   1    How many days per week do you exercise enough to make your heart beat faster? 3 or less    How many minutes a day do you exercise enough to make your heart beat faster? 9 or less    How many days per week do you miss taking your medication? 0    BP Readings from Last 3 Encounters:   02/26/20 138/72   09/20/19 138/80   06/13/19 136/76     Taking atenolol 50 mg and losartan 50 mg daily.         Reviewed and updated as needed this visit by Provider         Review of Systems   No dyspnea or cough. No chest discomfort, dizziness or palpitations. No diarrhea, abdominal pain or rectal bleeding.   No acute problems with vision or speech, lateralizing weakness or paresthesias.    ROS: as above or negative for Respiratory, CV, GI, endocrine, neuro systems.    This document serves as a record of the services and decisions personally performed and made by Liam Esquivel MD. It was created on his behalf by Becky Gomez, a trained medical scribe. The creation of this document is based on the provider's statements to the medical scribe.  Becky Gmoez February 26, 2020 11:17 AM         Objective    /72   Pulse 83   Temp 97.9  F (36.6  C) (Oral)   Resp 18   Ht 1.511 m (4' 11.5\")   Wt 77 kg (169 lb 12.8 oz)   SpO2 100%   BMI 33.72 kg/m    Body mass index is 33.72 kg/m .     Physical Exam "   GENERAL: healthy, alert and no distress  RESP: lungs clear to auscultation - no rales, rhonchi or wheezes  CV: regular rate and rhythm, normal S1 S2, no S3 or S4, no murmur, click or rub, no peripheral edema and peripheral pulses strong  MS: no gross musculoskeletal defects noted, no edema  SKIN: no suspicious lesions or rashes  NEURO: Normal strength and tone, mentation intact and speech normal  PSYCH: mentation appears normal, affect normal/bright      Diagnostic Test Results:  Labs reviewed in Epic        Assessment & Plan     (E11.21) Type 2 diabetes mellitus with diabetic nephropathy, without long-term current use of insulin (H)  (primary encounter diagnosis)  Comment: A1c increased. Patient is already on highest dose of metformin and experienced hypoglycemia with glimepiride. He does NOT want to add another medication today.   Encouraged diet changes (reduce carb and sweet consumption) and regular exercise. Recommended seeing diabetes education, patient declined.   Plan: losartan (COZAAR) 100 MG tablet          (I10) Essential hypertension with goal blood pressure less than 140/90  Comment: BP elevated today. Increased losartan to 100 mg daily.   Plan: losartan (COZAAR) 100 MG tablet          (E78.5) Hyperlipidemia LDL goal <100  Comment: LDL at target. Continue current meds.  Plan:      FUTURE APPOINTMENTS:       - Follow-up visit in 3 months    Patient Instructions   No new diabetes medications today.   Keep taking four metformin tablets each day, and watching your carbohydrate intake carefully.     Blood pressure a bit too high. Increase your Losartan from 50 mg to 100 mg each day.     LDL-Cholesterol looks fine.     See me back in three months, with labs a few days in advance.       The information in this document, created by the medical scribe for me, accurately reflects the services I personally performed and the decisions made by me. I have reviewed and approved this document for accuracy prior to  leaving the patient care area.  February 26, 2020 11:28 AM    Liam Esquivel MD,   [facility]

## 2020-02-26 NOTE — NURSING NOTE
"BP (!) 144/83 (BP Location: Right arm, Patient Position: Sitting, Cuff Size: Adult Large)   Pulse 83   Temp 97.9  F (36.6  C) (Oral)   Resp 18   Ht 1.511 m (4' 11.5\")   Wt 77 kg (169 lb 12.8 oz)   SpO2 100%   BMI 33.72 kg/m    Patient is being seen for a Diabetic follow up ( HTN, LDL, DM).  "

## 2020-02-26 NOTE — PATIENT INSTRUCTIONS
No new diabetes medications today.   Keep taking four metformin tablets each day, and watching your carbohydrate intake carefully.     Blood pressure a bit too high. Increase your Losartan from 50 mg to 100 mg each day.     LDL-Cholesterol looks fine.     See me back in three months, with labs a few days in advance.

## 2020-03-05 ENCOUNTER — PATIENT OUTREACH (OUTPATIENT)
Dept: GERIATRIC MEDICINE | Facility: CLINIC | Age: 80
End: 2020-03-05

## 2020-03-05 NOTE — PROGRESS NOTES
[facility] Care Coordination Contact    Call placed to member with an  to inquire if he rec'd and mailed the AVS form to [facility]. Member states that he has not rec'd the forms. Explained that CC will mail him the forms, explained that it is important to mail the forms back to [facility], address will be highlighted.  Emerita Pineda RN, BC  Manager [facility] Care Coordinator   [phone]  (Fax)

## 2020-03-15 DIAGNOSIS — K21.9 GASTROESOPHAGEAL REFLUX DISEASE WITHOUT ESOPHAGITIS: ICD-10-CM

## 2020-03-15 DIAGNOSIS — E11.21 TYPE 2 DIABETES MELLITUS WITH DIABETIC NEPHROPATHY, WITHOUT LONG-TERM CURRENT USE OF INSULIN (H): ICD-10-CM

## 2020-03-16 NOTE — TELEPHONE ENCOUNTER
"Requested Prescriptions   Pending Prescriptions Disp Refills     ranitidine (ZANTAC) 150 MG tablet [Pharmacy Med Name: RANITIDINE 150MG TABLETS]  Last Written Prescription Date:  3/13/2019  Last Fill Quantity: 90,  # refills: 3   Last office visit: 2/26/2020 with prescribing provider:     Future Office Visit:   Next 5 appointments (look out 90 days)    May 27, 2020  1:00 PM CDT  Office Visit with Liam Esquivel MD  [facility] ([facility]) [address]  [facility] [address]  [phone]        90 tablet 3     Sig: TAKE 1 TABLET(150 MG) BY MOUTH DAILY FOR HEARTBURN       H2 Blockers Protocol Passed - 3/15/2020  1:32 PM        Passed - Patient is age 12 or older        Passed - Recent (12 mo) or future (30 days) visit within the authorizing provider's specialty     Patient has had an office visit with the authorizing provider or a provider within the authorizing providers department within the previous 12 mos or has a future within next 30 days. See \"Patient Info\" tab in inbasket, or \"Choose Columns\" in Meds & Orders section of the refill encounter.              Passed - Medication is active on med list           blood glucose (ONETOUCH ULTRA) test strip [Pharmacy Med Name: ONE TOUCH ULTRA BLUE TESTST(NEW)100]  Last Written Prescription Date:  3/13/2019  Last Fill Quantity: 100,  # refills: 3   Last office visit: 2/26/2020 with prescribing provider:     Future Office Visit:   Next 5 appointments (look out 90 days)    May 27, 2020  1:00 PM CDT  Office Visit with Liam Esquivel MD  [facility] ([facility]) [address]  [facility] [address]  [phone]        200 strip      Sig: TEST EVERY DAY AS DIRECTED       Diabetic Supplies Protocol Passed - 3/15/2020  1:32 PM        Passed - Medication is active on med list        Passed - Patient is 18 years of age or older        Passed - Recent (6 mo) or future (30 days) visit " "within the authorizing provider's specialty     Patient had office visit in the last 6 months or has a visit in the next 30 days with authorizing provider.  See \"Patient Info\" tab in inbasket, or \"Choose Columns\" in Meds & Orders section of the refill encounter.               "

## 2020-03-17 ENCOUNTER — TELEPHONE (OUTPATIENT)
Dept: INTERNAL MEDICINE | Facility: CLINIC | Age: 80
End: 2020-03-17

## 2020-03-31 DIAGNOSIS — I10 ESSENTIAL HYPERTENSION WITH GOAL BLOOD PRESSURE LESS THAN 140/90: ICD-10-CM

## 2020-03-31 DIAGNOSIS — E11.21 TYPE 2 DIABETES MELLITUS WITH DIABETIC NEPHROPATHY, WITHOUT LONG-TERM CURRENT USE OF INSULIN (H): ICD-10-CM

## 2020-03-31 NOTE — TELEPHONE ENCOUNTER
"Requested Prescriptions   Pending Prescriptions Disp Refills     losartan (COZAAR) 50 MG tablet [Pharmacy Med Name: LOSARTAN 50MG TABLETS] 90 tablet 1     Sig: TAKE 1 TABLET(50 MG) BY MOUTH DAILY   Last Written Prescription Date:  02/26/2020  Last Fill Quantity: 90,  # refills: 01   Last office visit: 2/26/2020 with prescribing provider:     Future Office Visit:   Next 5 appointments (look out 90 days)    May 27, 2020  1:00 PM CDT  Office Visit with Liam Esquivel MD  [facility] ([facility]) [address]  [facility] [address]  [phone]           Angiotensin-II Receptors Passed - 3/31/2020  5:30 PM        Passed - Last blood pressure under 140/90 in past 12 months     BP Readings from Last 3 Encounters:   02/26/20 138/72   09/20/19 138/80   06/13/19 136/76                 Passed - Recent (12 mo) or future (30 days) visit within the authorizing provider's specialty     Patient has had an office visit with the authorizing provider or a provider within the authorizing providers department within the previous 12 mos or has a future within next 30 days. See \"Patient Info\" tab in inbasket, or \"Choose Columns\" in Meds & Orders section of the refill encounter.              Passed - Medication is active on med list        Passed - Patient is age 18 or older        Passed - Normal serum creatinine on file in past 12 months     Recent Labs   Lab Test 02/19/20  0758   CR 0.90       Ok to refill medication if creatinine is low          Passed - Normal serum potassium on file in past 12 months     Recent Labs   Lab Test 02/19/20  0758   POTASSIUM 4.2                       "

## 2020-04-01 RX ORDER — LOSARTAN POTASSIUM 50 MG/1
TABLET ORAL
Qty: 90 TABLET | Refills: 1 | OUTPATIENT
Start: 2020-04-01

## 2020-05-12 DIAGNOSIS — I10 ESSENTIAL HYPERTENSION WITH GOAL BLOOD PRESSURE LESS THAN 140/90: ICD-10-CM

## 2020-05-12 RX ORDER — ATENOLOL 50 MG/1
50 TABLET ORAL DAILY
Qty: 90 TABLET | Refills: 0 | Status: SHIPPED | OUTPATIENT
Start: 2020-05-12 | End: 2020-07-22

## 2020-05-20 DIAGNOSIS — E11.21 TYPE 2 DIABETES MELLITUS WITH DIABETIC NEPHROPATHY, WITHOUT LONG-TERM CURRENT USE OF INSULIN (H): ICD-10-CM

## 2020-05-20 LAB — HBA1C MFR BLD: 8.1 % (ref 0–5.6)

## 2020-05-20 PROCEDURE — 83036 HEMOGLOBIN GLYCOSYLATED A1C: CPT | Performed by: INTERNAL MEDICINE

## 2020-05-20 PROCEDURE — 36415 COLL VENOUS BLD VENIPUNCTURE: CPT | Performed by: INTERNAL MEDICINE

## 2020-05-27 ENCOUNTER — VIRTUAL VISIT (OUTPATIENT)
Dept: INTERNAL MEDICINE | Facility: CLINIC | Age: 80
End: 2020-05-27
Payer: COMMERCIAL

## 2020-05-27 DIAGNOSIS — E11.21 TYPE 2 DIABETES MELLITUS WITH DIABETIC NEPHROPATHY, WITHOUT LONG-TERM CURRENT USE OF INSULIN (H): Primary | ICD-10-CM

## 2020-05-27 DIAGNOSIS — I10 ESSENTIAL HYPERTENSION WITH GOAL BLOOD PRESSURE LESS THAN 140/90: ICD-10-CM

## 2020-05-27 DIAGNOSIS — E78.5 HYPERLIPIDEMIA LDL GOAL <100: ICD-10-CM

## 2020-05-27 PROCEDURE — 99213 OFFICE O/P EST LOW 20 MIN: CPT | Mod: 95 | Performed by: INTERNAL MEDICINE

## 2020-05-27 RX ORDER — AMLODIPINE BESYLATE 5 MG/1
5 TABLET ORAL
COMMUNITY
Start: 2020-03-20 | End: 2021-01-13

## 2020-05-27 NOTE — PROGRESS NOTES
"[id number]    Telephone visit: 4 minute conversation.    Chip Shanks is a 80 year old male who is being evaluated via a billable telephone visit.      The patient has been notified of following:     \"This telephone visit will be conducted via a call between you and your physician/provider. We have found that certain health care needs can be provided without the need for a physical exam.  This service lets us provide the care you need with a short phone conversation.  If a prescription is necessary we can send it directly to your pharmacy.  If lab work is needed we can place an order for that and you can then stop by our lab to have the test done at a later time.    Telephone visits are billed at different rates depending on your insurance coverage. During this emergency period, for some insurers they may be billed the same as an in-person visit.  Please reach out to your insurance provider with any questions.    If during the course of the call the physician/provider feels a telephone visit is not appropriate, you will not be charged for this service.\"    Patient has given verbal consent for Telephone visit?  Yes    What phone number would you like to be contacted at? [phone]    How would you like to obtain your AVS? Mail a copy    Subjective     Chip Shanks is a 80 year old male who presents via phone visit today for the following health issues:    HPI  Diabetes Follow-up    How often are you checking your blood sugar? One time daily  What time of day are you checking your blood sugars (select all that apply)?  Before meals  Have you had any blood sugars above 200?  No  Have you had any blood sugars below 70?  No    What symptoms do you notice when your blood sugar is low?  None    What concerns do you have today about your diabetes? None     Do you have any of these symptoms? (Select all that apply)  No numbness or tingling in feet.  No redness, sores or blisters on feet.  No complaints of excessive " thirst.  No reports of blurry vision.  No significant changes to weight.    Have you had a diabetic eye exam in the last 12 months? No          Hyperlipidemia Follow-Up      Are you regularly taking any medication or supplement to lower your cholesterol?   Yes- Simvastatin    Are you having muscle aches or other side effects that you think could be caused by your cholesterol lowering medication?  No    Hypertension Follow-up      Do you check your blood pressure regularly outside of the clinic? Yes     Are you following a low salt diet? Yes    Are your blood pressures ever more than 140 on the top number (systolic) OR more   than 90 on the bottom number (diastolic), for example 140/90? No    BP Readings from Last 2 Encounters:   02/26/20 138/72   09/20/19 138/80     Hemoglobin A1C (%)   Date Value   05/20/2020 8.1 (H)   02/19/2020 8.5 (H)     LDL Cholesterol Calculated (mg/dL)   Date Value   02/19/2020 58   03/05/2019 64         How many servings of fruits and vegetables do you eat daily?  2-3    On average, how many sweetened beverages do you drink each day (Examples: soda, juice, sweet tea, etc.  Do NOT count diet or artificially sweetened beverages)?   1    How many days per week do you exercise enough to make your heart beat faster? 3 or less    How many minutes a day do you exercise enough to make your heart beat faster? 9 or less    How many days per week do you miss taking your medication? 0    The patient reports checking glucoses each morning.  He states that they run in the 99-1 40 range.    Past medical, family and social histories as well as medications reviewed and updated as needed.     Reviewed and updated as needed this visit by Provider         Review of Systems   No dyspnea or cough. No chest discomfort, dizziness or palpitations.  No acute problems with vision or speech, lateralizing weakness or paresthesias.    ROS: as above or negative for Respiratory, CV, endocrine, neuro systems.         Objective   Reported vitals:  There were no vitals taken for this visit.     PSYCH: Alert and oriented times 3; coherent speech, normal   rate and volume, able to articulate logical thoughts.  RESP: No cough, no audible wheezing, able to talk in full sentences  Remainder of exam unable to be completed due to telephone visits    Diagnostic Test Results:  Labs reviewed in Epic        Assessment/Plan:  1. Type 2 diabetes mellitus with diabetic nephropathy, without long-term current use of insulin (H)  BP just above target. Continue current meds.   - Albumin Random Urine Quantitative with Creat Ratio; Future  - **A1C FUTURE 3mo; Future  - blood glucose (NO BRAND SPECIFIED) lancets standard; Use to test blood sugar once daily or as directed.  Dispense: 100 each; Refill: 1    2. Hyperlipidemia LDL goal <100  LDL at target. Continue current meds.     3. Essential hypertension with goal blood pressure less than 140/90  BP appears controlled. Continue current meds.   - Basic metabolic panel  (Ca, Cl, CO2, Creat, Gluc, K, Na, BUN); Future    No follow-ups on file.      Phone call duration:  4 minutes    Liam Esquivel MD,

## 2020-05-29 ENCOUNTER — PATIENT OUTREACH (OUTPATIENT)
Dept: GERIATRIC MEDICINE | Facility: CLINIC | Age: 80
End: 2020-05-29

## 2020-05-29 NOTE — PROGRESS NOTES
[facility] Care Coordination Contact    Call placed to member to schedule annual assessment. Explained that the assessment will be completed by telephone due to COVID 19, member stated understanding.  Assessment scheduled for 6/3/20 @ 11 AM.   Explained that CC will utilize an  for the assessment.  Emerita Pineda RN, BC  Manager [facility] Care Coordinator   [phone]  (Fax)

## 2020-06-03 ENCOUNTER — PATIENT OUTREACH (OUTPATIENT)
Dept: GERIATRIC MEDICINE | Facility: CLINIC | Age: 80
End: 2020-06-03

## 2020-06-03 DIAGNOSIS — Z76.89 HEALTH CARE HOME: ICD-10-CM

## 2020-06-03 ASSESSMENT — ACTIVITIES OF DAILY LIVING (ADL): DEPENDENT_IADLS:: CLEANING;LAUNDRY;SHOPPING

## 2020-06-03 NOTE — LETTER
Linnea 3, 2020      DIEGO DUMONT  [address] [place] [id number]      Dear Diego:    At [facility], we are dedicated to improving your health and well-being. Enclosed is the Comprehensive Care Plan that we developed with you on 06/03/2020. Please review the Care Plan carefully.    As a reminder, some of the things we discussed at your visit include:    Your physical and mental health    Ways to reduce falls    Health care needs you may have    Don t forget to contact your care coordinator if you:    Have been hospitalized or plan to be hospitalized     Have had a fall     Have experienced a change in physical health    Are experiencing emotional problems     If you do not agree with your Care Plan, have questions about it, or have experienced a change in your needs, please call me at [phone]. If you are hearing impaired, please call the [place] [employer] at 894 or [phone] (hnabhk-hp-vsenwe relay service).    Sincerely,    Emerita Pineda RN    E-mail: [email or web address]  Phone: [phone]      [facility]) is a health plan that contracts with both Medicare and the [place] Medical Assistance (Medicaid) program to provide benefits of both programs to enrollees. Enrollment in [facility] depends on contract renewal.    [id number])     [id number] (11/18)

## 2020-06-03 NOTE — PROGRESS NOTES
[facility] Care Coordination Contact    Received after visit chart from care coordinator.  Completed following tasks: Mailed copy of care plan to client, Updated services in access and Submitted referrals/auths for lifeline services. [employer] Card continuously refilled.   and Provider Signature - No POC Shared:  Member indicates that they do not want their POC shared with any EW providers.     Member was mailed a copy of the POC signature sheet to sign and return mail.  This assessment was completed telephonically due to Covid-19.    Ana Hodges  Care Management Specialist  [facility]  [phone]

## 2020-06-03 NOTE — PROGRESS NOTES
[facility] Care Coordination Contact    [facility] Home Visit Assessment   Health Risk Assessment with Chip Shanks completed on Linnea 3, 2020. Assessment completed remotely due to COVID 19.  Chip reports no concerns, hared that he has support of his adult children that all live locally.     Type of residence:: Apartment  Current living arrangement:: I live in a private home with family. Chip resides in an apt with his adult son. The apt is located on the second floor, no elevator. Chip reports that at times it is difficult to go up and down the steps due to knee pain    Assessment completed with:: Patient    Current Care Plan  Member currently receiving the following home care services:   NA  Member currently receiving the following community resources: Lifeline, Transportation Services      Medication Review  Medication reconciliation completed in Epic: Yes  Medication set-up & administration: Independent and sets up on own weekly.  Self-administers medications.  Medication Risk Assessment Medication (1 or more, place referral to MTM): N/A: No risk factors identified  MTM Referral Placed: No: No risk factors idenified   Member is not taking Amlodipine that was recently prescribed by a [facility] provider, CC will update Dr. Esquivel.     Mental/Behavioral Health   Depression Screening: PHQ 2=0. Chip reports that he is happy, denies feeling anxious or depressed.   PHQ-2 Total Score (Adult) - Positive if 3 or more points; Administer PHQ-9 if positive: 0       Mental health DX:: No        Falls Assessment:   Fallen 2 or more times in the past year?: No   Any fall with injury in the past year?: No    ADL/IADL Dependencies:   Dependent ADLs:: Ambulation-cane  Dependent IADLs:: Cleaning, Laundry, Shopping    [facility] Plan sponsored benefits: Shared information re: Silver Sneakers/gym memberships, ASA, Calcium +D.    PCA Assessment completed at visit: No     Elderly Waiver Eligibility: Yes-will  continue on EW    Care Plan & Recommendations:   Continue with Lifeline referral and [employer] passes.  CC will outreach to apt management to inquire on wait list for a first floor apt.   CC will contact Chip early August to assist with scheduling his P[facility] apt.     See [facility] for detailed assessment information.    Follow-Up Plan: Member informed of future contact, plan to f/u with member with a 6 month telephone assessment.  Contact information shared with member and family, encouraged member to call with any questions or concerns at any time.    [facility] care continuum providers: Please refer to Health Care Home on the Epic Problem List to view this patient's [facility] Care Plan Summary.  Emerita Pineda RN, BC  Manager [facility] Care Coordinator   [phone]  (Fax)

## 2020-06-10 ENCOUNTER — PATIENT OUTREACH (OUTPATIENT)
Dept: GERIATRIC MEDICINE | Facility: CLINIC | Age: 80
End: 2020-06-10

## 2020-06-10 NOTE — PROGRESS NOTES
[facility] Care Coordination Contact    Per member's request, call placed to apt management to request that member be put on a wait list for a first floor apartment.  CC informed that there is one person ahead of Chip, he was placed on the wait list.  Emerita Pineda RN, BC  Manager [facility] Care Coordinator   [phone]  (Fax)

## 2020-07-21 DIAGNOSIS — I10 ESSENTIAL HYPERTENSION WITH GOAL BLOOD PRESSURE LESS THAN 140/90: ICD-10-CM

## 2020-07-22 DIAGNOSIS — I10 ESSENTIAL HYPERTENSION WITH GOAL BLOOD PRESSURE LESS THAN 140/90: ICD-10-CM

## 2020-07-22 DIAGNOSIS — E11.21 TYPE 2 DIABETES MELLITUS WITH DIABETIC NEPHROPATHY, WITHOUT LONG-TERM CURRENT USE OF INSULIN (H): ICD-10-CM

## 2020-07-22 RX ORDER — ATENOLOL 50 MG/1
50 TABLET ORAL DAILY
Qty: 90 TABLET | Refills: 2 | Status: SHIPPED | OUTPATIENT
Start: 2020-07-22 | End: 2021-01-13

## 2020-07-22 NOTE — TELEPHONE ENCOUNTER
"Requested Prescriptions   Pending Prescriptions Disp Refills     atenolol (TENORMIN) 50 MG tablet 90 tablet 0     Sig: Take 1 tablet (50 mg) by mouth daily       Beta-Blockers Protocol Passed - 7/21/2020  1:06 PM        Passed - Blood pressure under 140/90 in past 12 months     BP Readings from Last 3 Encounters:   02/26/20 138/72   09/20/19 138/80   06/13/19 136/76                 Passed - Patient is age 6 or older        Passed - Recent (12 mo) or future (30 days) visit within the authorizing provider's specialty     Patient has had an office visit with the authorizing provider or a provider within the authorizing providers department within the previous 12 mos or has a future within next 30 days. See \"Patient Info\" tab in inbasket, or \"Choose Columns\" in Meds & Orders section of the refill encounter.              Passed - Medication is active on med list             Last virtual visit 5-27-20    Prescription approved per [facility] Refill Protocol.    "

## 2020-07-23 RX ORDER — LOSARTAN POTASSIUM 100 MG/1
100 TABLET ORAL DAILY
Qty: 90 TABLET | Refills: 1 | Status: SHIPPED | OUTPATIENT
Start: 2020-07-23 | End: 2020-12-16

## 2020-07-23 NOTE — TELEPHONE ENCOUNTER
Pending Prescriptions:                       Disp   Refills    losartan (COZAAR) 100 MG tablet           90 tab*1            Sig: Take 1 tablet (100 mg) by mouth daily    Prescription approved per [facility] Refill Protocol.

## 2020-08-03 ENCOUNTER — PATIENT OUTREACH (OUTPATIENT)
Dept: GERIATRIC MEDICINE | Facility: CLINIC | Age: 80
End: 2020-08-03

## 2020-08-03 NOTE — PROGRESS NOTES
[facility] Care Coordination Contact    Rec'd vm from member requesting assistance with scheduling his appt with Dr. Esquivel. Call placed to member, conference call to PCC to schedule lab appt and virtual visit with PCP.  Offered  to ensure that Chip understands his appt's, Chip repeated dates/times and declined .  Emerita Pineda RN, BC  Manager [facility] Care Coordinator   [phone]  (Fax)

## 2020-08-13 DIAGNOSIS — I10 ESSENTIAL HYPERTENSION WITH GOAL BLOOD PRESSURE LESS THAN 140/90: ICD-10-CM

## 2020-08-13 DIAGNOSIS — E11.21 TYPE 2 DIABETES MELLITUS WITH DIABETIC NEPHROPATHY, WITHOUT LONG-TERM CURRENT USE OF INSULIN (H): ICD-10-CM

## 2020-08-13 LAB
ANION GAP SERPL CALCULATED.3IONS-SCNC: 4 MMOL/L (ref 3–14)
BUN SERPL-MCNC: 8 MG/DL (ref 7–30)
CALCIUM SERPL-MCNC: 9.2 MG/DL (ref 8.5–10.1)
CHLORIDE SERPL-SCNC: 105 MMOL/L (ref 94–109)
CO2 SERPL-SCNC: 30 MMOL/L (ref 20–32)
CREAT SERPL-MCNC: 1.05 MG/DL (ref 0.66–1.25)
GFR SERPL CREATININE-BSD FRML MDRD: 67 ML/MIN/{1.73_M2}
GLUCOSE SERPL-MCNC: 125 MG/DL (ref 70–99)
HBA1C MFR BLD: 7.8 % (ref 0–5.6)
POTASSIUM SERPL-SCNC: 3.9 MMOL/L (ref 3.4–5.3)
SODIUM SERPL-SCNC: 139 MMOL/L (ref 133–144)

## 2020-08-13 PROCEDURE — 80048 BASIC METABOLIC PNL TOTAL CA: CPT | Performed by: INTERNAL MEDICINE

## 2020-08-13 PROCEDURE — 83036 HEMOGLOBIN GLYCOSYLATED A1C: CPT | Performed by: INTERNAL MEDICINE

## 2020-08-13 PROCEDURE — 36415 COLL VENOUS BLD VENIPUNCTURE: CPT | Performed by: INTERNAL MEDICINE

## 2020-08-13 PROCEDURE — 82043 UR ALBUMIN QUANTITATIVE: CPT | Performed by: INTERNAL MEDICINE

## 2020-08-14 ENCOUNTER — VIRTUAL VISIT (OUTPATIENT)
Dept: INTERNAL MEDICINE | Facility: CLINIC | Age: 80
End: 2020-08-14
Payer: COMMERCIAL

## 2020-08-14 VITALS
HEIGHT: 60 IN | WEIGHT: 173.3 LBS | RESPIRATION RATE: 18 BRPM | DIASTOLIC BLOOD PRESSURE: 60 MMHG | BODY MASS INDEX: 34.02 KG/M2 | TEMPERATURE: 98.9 F | OXYGEN SATURATION: 95 % | SYSTOLIC BLOOD PRESSURE: 132 MMHG | HEART RATE: 77 BPM

## 2020-08-14 DIAGNOSIS — E78.5 HYPERLIPIDEMIA LDL GOAL <100: ICD-10-CM

## 2020-08-14 DIAGNOSIS — H66.91 ACUTE RIGHT OTITIS MEDIA: ICD-10-CM

## 2020-08-14 DIAGNOSIS — E11.21 TYPE 2 DIABETES MELLITUS WITH DIABETIC NEPHROPATHY, WITHOUT LONG-TERM CURRENT USE OF INSULIN (H): ICD-10-CM

## 2020-08-14 DIAGNOSIS — H60.391 INFECTIVE OTITIS EXTERNA, RIGHT: Primary | ICD-10-CM

## 2020-08-14 DIAGNOSIS — I10 ESSENTIAL HYPERTENSION WITH GOAL BLOOD PRESSURE LESS THAN 140/90: ICD-10-CM

## 2020-08-14 DIAGNOSIS — K21.9 GASTROESOPHAGEAL REFLUX DISEASE, ESOPHAGITIS PRESENCE NOT SPECIFIED: ICD-10-CM

## 2020-08-14 LAB
CREAT UR-MCNC: 124 MG/DL
MICROALBUMIN UR-MCNC: 200 MG/L
MICROALBUMIN/CREAT UR: 161.29 MG/G CR (ref 0–17)

## 2020-08-14 PROCEDURE — 99214 OFFICE O/P EST MOD 30 MIN: CPT | Performed by: INTERNAL MEDICINE

## 2020-08-14 RX ORDER — CIPROFLOXACIN AND DEXAMETHASONE 3; 1 MG/ML; MG/ML
4 SUSPENSION/ DROPS AURICULAR (OTIC) 2 TIMES DAILY
Qty: 7.5 ML | Refills: 0 | Status: SHIPPED | OUTPATIENT
Start: 2020-08-14 | End: 2020-08-21

## 2020-08-14 RX ORDER — FAMOTIDINE 20 MG/1
20 TABLET, FILM COATED ORAL DAILY
Qty: 90 TABLET | Refills: 3 | Status: SHIPPED | OUTPATIENT
Start: 2020-08-14 | End: 2021-07-14

## 2020-08-14 ASSESSMENT — MIFFLIN-ST. JEOR: SCORE: 1340.64

## 2020-08-14 NOTE — PATIENT INSTRUCTIONS
"Right ear canal looks red. Can't see the eardrum.   Will treat with ear drop and pill antibiotics.     Everything else looks fine.   No fevers, heart rate and blood pressure looks okay, and exam otherwise looks okay.     No other medication changes. Diabetes looks good.     See me in six months for diabetes recheck, with \"lab only\" appointment a few days before the office appointment.   "

## 2020-08-14 NOTE — PROGRESS NOTES
Chip Shanks is a 80 year old male who was originally scheduled for a billable telephone visit.    However, due to history obtained on the phone, he was worked in for a face to face office visit today.     A Laotian  was unavailable for our visit, however, his daughter provided this function while on telephone speaker.       Subjective     Chip Shanks is a 80 year old male who presents for office visit today for the following health issues:  Patient is being seen for a follow up ( HTN, LDL, DM).  He also reports feeling feverish and chilled, with some dizziness, palpitations and right ear discomfort.     HPI    Diabetes Follow-up    How often are you checking your blood sugar? One time daily  What time of day are you checking your blood sugars (select all that apply)?  Before meals  Have you had any blood sugars above 200?  No  Have you had any blood sugars below 70?  No    What symptoms do you notice when your blood sugar is low?  None    What concerns do you have today about your diabetes? None     Do you have any of these symptoms? (Select all that apply)  No numbness or tingling in feet.  No redness, sores or blisters on feet.  No complaints of excessive thirst.  No reports of blurry vision.  No significant changes to weight.    Have you had a diabetic eye exam in the last 12 months? No        Hyperlipidemia Follow-Up      Are you regularly taking any medication or supplement to lower your cholesterol?   Yes- Simvastatin    Are you having muscle aches or other side effects that you think could be caused by your cholesterol lowering medication?  No    Hypertension Follow-up      Do you check your blood pressure regularly outside of the clinic? Yes     Are you following a low salt diet? No    Are your blood pressures ever more than 140 on the top number (systolic) OR more   than 90 on the bottom number (diastolic), for example 140/90? No    BP Readings from Last 2 Encounters:   02/26/20 138/72  "  09/20/19 138/80     Hemoglobin A1C (%)   Date Value   08/13/2020 7.8 (H)   05/20/2020 8.1 (H)     LDL Cholesterol Calculated (mg/dL)   Date Value   02/19/2020 58   03/05/2019 64         How many servings of fruits and vegetables do you eat daily?  0-1    On average, how many sweetened beverages do you drink each day (Examples: soda, juice, sweet tea, etc.  Do NOT count diet or artificially sweetened beverages)?   0    How many days per week do you exercise enough to make your heart beat faster? 3 or less    How many minutes a day do you exercise enough to make your heart beat faster? 9 or less    How many days per week do you miss taking your medication? 0       While initally obtaining telephone history for presumed telephone visit, the patient and his daughter report that he felt feverish and chilled last night and today. He also reports that his heart felt like it was \"racing\" and that he felt dizzy last night and today. He did not have a thermometer to check his home temperature.     Also, his daughter reports that a BP reading at home today was 173/101. A repeat BP was 158/93.     This history prompted a request that he come in for a face to face office visit today.     He does endorse having some right ear discomfort. No nasal congestion, sinus pain, sore throat. No cough or dyspnea.   Occasional heartburn. No nausea or vomiting, abdominal pain or stool changes. No dysuria or hematuria.     Past medical, family and social histories as well as medications reviewed and updated as needed.      Reviewed and updated as needed this visit by Provider         Review of Systems   REVIEW OF SYSTEMS: The following systems have been completely reviewed and are negative except as noted above:   Constitutional, HEENT, respiratory, cardiovascular, gastrointestinal, genitourinary, musculoskeletal, endocrine, and neurologic systems.         Objective      Vitals: /60   Pulse 77   Temp 98.9  F (37.2  C) (Oral)   Resp " "18   Ht 1.511 m (4' 11.5\")   Wt 78.6 kg (173 lb 4.8 oz)   SpO2 95%   BMI 34.42 kg/m    BMI= Body mass index is 34.42 kg/m .     Head normal.  Eyes are normal. PERRLA, corneas, lids and conjunctivae normal.   External ear on the right appears slightly red and edematous, tender with any attempts to remove wax with curette. TM obscured by wax--unable to remove with curette.   Canal clear on the left with normal TM..   Nose normal without lesions or discharge. Oropharynx normal. Neck supple without palpable adenopathy.    Chest wall normal to inspection and palpation. Good excursion bilaterally. Lungs clear to auscultation. Good air movement bilaterally without rales, wheezes, or rhonchi.    CV:  Normal to precordial palpation and auscultation.    Abdomen: soft without tenderness, guarding, mass or organomegaly.     Skin: Normal to inspection and palpation.    Ext:  All four extremities normal to inspection and palpation.  Lymph: No cervical lymphadenopathy.    Neuro: Alert, well-oriented. Speech fluent. CN II-XII intact.  Moves all extremities with equal strength and facility.  DTR's symmetric to all extremities. Gait narrow-based.     Diagnostic Test Results:  Labs reviewed in Epic        Assessment & Plan     (H60.391) Infective otitis externa, right  (primary encounter diagnosis)  Comment: Possibly irritated by use of Q-tips. Offered Rx. Unclear whether finding is responsible for reported fever or chills, but no other obvious source of infection identified today.   Plan: ciprofloxacin-dexamethasone (CIPRODEX) 0.3-0.1         % otic suspension          (H66.91) Acute right otitis media  Comment: TM not visualized, but will treat empirically with antibiotic.   Plan: amoxicillin-clavulanate (AUGMENTIN) 875-125 MG         tablet          (E11.21) Type 2 diabetes mellitus with diabetic nephropathy, without long-term current use of insulin (H)  Comment: A1c at target. Continue current measures.     (I10) Essential " "hypertension with goal blood pressure less than 140/90  Comment: BP at target. Continue current meds.     (E78.5) Hyperlipidemia LDL goal <100  Comment: LDL at target. Continue current meds.     (K21.9) Gastroesophageal reflux disease, esophagitis presence not specified  Comment: Offered Rx for famotidine, as he was unable to fill ranitidine which was previously prescribed but has been removed from the market.   Plan: famotidine (PEPCID) 20 MG tablet             BMI:   Estimated body mass index is 34.42 kg/m  as calculated from the following:    Height as of this encounter: 1.511 m (4' 11.5\").    Weight as of this encounter: 78.6 kg (173 lb 4.8 oz).   Weight management plan: Discussed healthy diet and exercise guidelines        Patient Instructions   Right ear canal looks red. Can't see the eardrum.   Will treat with ear drop and pill antibiotics.     Everything else looks fine.   No fevers, heart rate and blood pressure looks okay, and exam otherwise looks okay.     No other medication changes. Diabetes looks good.     See me in six months for diabetes recheck, with \"lab only\" appointment a few days before the office appointment.       Return in about 6 months (around 2/14/2021) for Diabetes Recheck.    Liam Esquivel MD,   [facility]                "

## 2020-09-28 DIAGNOSIS — M25.561 RIGHT KNEE PAIN, UNSPECIFIED CHRONICITY: ICD-10-CM

## 2020-09-29 DIAGNOSIS — E11.21 TYPE 2 DIABETES MELLITUS WITH DIABETIC NEPHROPATHY, WITHOUT LONG-TERM CURRENT USE OF INSULIN (H): ICD-10-CM

## 2020-09-29 DIAGNOSIS — L50.9 URTICARIA: ICD-10-CM

## 2020-09-29 RX ORDER — CETIRIZINE HYDROCHLORIDE 10 MG/1
TABLET ORAL
Qty: 90 TABLET | Refills: 0 | Status: SHIPPED | OUTPATIENT
Start: 2020-09-29 | End: 2020-12-17

## 2020-09-29 RX ORDER — LIDOCAINE 50 MG/G
PATCH TOPICAL
Qty: 30 PATCH | Refills: 3 | Status: SHIPPED | OUTPATIENT
Start: 2020-09-29 | End: 2021-04-14

## 2020-09-29 NOTE — TELEPHONE ENCOUNTER
Pending Prescriptions:                       Disp   Refills    lidocaine (LIDODERM) 5 % patch [Pharmacy *30 pat*3            Sig: APPLY UP TO 3 PATCHES TO PAINFUL AREA FOR UP TO           12 HOURS WITHIN A 24 HOUR PERIOD.    Prescription approved per [facility] Refill Protocol.

## 2020-09-30 NOTE — TELEPHONE ENCOUNTER
Pending Prescriptions:                       Disp   Refills    metFORMIN (GLUCOPHAGE) 500 MG tablet      360 ta*1            Sig: TAKE 2 TABLETS BY MOUTH EVERY MORNING AND 1 TO 2           WITH EACH SUPPER.    Prescription approved per [facility] Refill Protocol.

## 2020-09-30 NOTE — TELEPHONE ENCOUNTER
"Requested Prescriptions   Pending Prescriptions Disp Refills     metFORMIN (GLUCOPHAGE) 500 MG tablet 360 tablet 1     Sig: TAKE 2 TABLETS BY MOUTH EVERY MORNING AND 1 TO 2 WITH EACH SUPPER.       Biguanide Agents Passed - 9/29/2020  3:39 PM        Passed - Patient is age 10 or older        Passed - Patient has documented A1c within the specified period of time.     If HgbA1C is 8 or greater, it needs to be on file within the past 3 months.  If less than 8, must be on file within the past 6 months.     Recent Labs   Lab Test 08/13/20  0801   A1C 7.8*             Passed - Patient's CR is NOT>1.4 OR Patient's EGFR is NOT<45 within past 12 mos.     Recent Labs   Lab Test 08/13/20  0801   GFRESTIMATED 67   GFRESTBLACK 78       Recent Labs   Lab Test 08/13/20  0801   CR 1.05             Passed - Patient does NOT have a diagnosis of CHF.        Passed - Medication is active on med list        Passed - Recent (6 mo) or future (30 days) visit within the authorizing provider's specialty     Patient had office visit in the last 6 months or has a visit in the next 30 days with authorizing provider or within the authorizing provider's specialty.  See \"Patient Info\" tab in inbasket, or \"Choose Columns\" in Meds & Orders section of the refill encounter.                 "

## 2020-10-16 ENCOUNTER — TELEPHONE (OUTPATIENT)
Dept: INTERNAL MEDICINE | Facility: CLINIC | Age: 80
End: 2020-10-16

## 2020-10-16 NOTE — TELEPHONE ENCOUNTER
Prior Authorization Retail Medication Request    Medication/Dose: Lidocaine patch  ICD code (if different than what is on RX):  unknown  Previously Tried and Failed:  unkown  Rationale:  unknown    Insurance Name:  unknown  Insurance ID:  unknown      Pharmacy Information (if different than what is on RX)  Name:  [facility]  Phone:  [phone]

## 2020-10-19 NOTE — TELEPHONE ENCOUNTER
Central Prior Authorization Team   Phone: [phone]      Prior Authorization Approval    Authorization Effective Date: 9/19/2020  Authorization Expiration Date: 10/19/2021  Medication: Lidocaine patch - APPROVED  Approved Dose/Quantity: 90 FOR 30  Reference #:     Insurance Company: [employer] - Phone [phone] Fax [phone]  Expected CoPay:       CoPay Card Available:      Foundation Assistance Needed:    Which Pharmacy is filling the prescription (Not needed for infusion/clinic administered): [id number] - [address] [facility] [address]  Pharmacy Notified: Yes  Patient Notified: Yes (**Instructed pharmacy to notify patient when script is ready to /ship.**)

## 2020-11-30 ENCOUNTER — PATIENT OUTREACH (OUTPATIENT)
Dept: GERIATRIC MEDICINE | Facility: CLINIC | Age: 80
End: 2020-11-30

## 2020-11-30 ENCOUNTER — TELEPHONE (OUTPATIENT)
Dept: INTERNAL MEDICINE | Facility: CLINIC | Age: 80
End: 2020-11-30

## 2020-11-30 DIAGNOSIS — H92.01 RIGHT EAR PAIN: ICD-10-CM

## 2020-11-30 DIAGNOSIS — H66.91 ACUTE RIGHT OTITIS MEDIA: ICD-10-CM

## 2020-11-30 DIAGNOSIS — H60.391 INFECTIVE OTITIS EXTERNA, RIGHT: Primary | ICD-10-CM

## 2020-11-30 NOTE — PROGRESS NOTES
[facility] Care Coordination Contact    11/27/20 Out of office message. Rec'd vm from member stating that he was given a prescription for his ear pain and the pharmacy gave him something for his throat. Member is asking assistance to call the pharmacy.  11/30/0 Call placed to member with an , he shared that he went to [facility] clinic on 11/25/20 due to ear pain and when he went to [facility] to p/u the medication it was a nasal spray.  Conference call to [facility] [phone], member informed that the prescription that was received was for Flonase. Member stated that he does not want a nasal spray and stated he has no sinus pressure. Member requested CC to assist in calling [facility] clinic, conference call placed to clinic, not able to speak with a team member or leave a vm at this time. This information communicated to member, CC inquired if his daughter could assist him with calling [facility] clinic. Member stated that he would prefer to make an appt with PCP. Explained that CC can assist him later in calling [facility], stating that if he is not satisfied with the recommendations, then CC will assist him in scheduling an appt with PCP.  Call placed back to member with an . Member inquired if he could see a specialist regarding his right ear pain. Member agreeable to contact his PCP. CC spoke with Liv at [facility], she will review above information with  and f/u with this CC. Member stated understanding, will wait for CC to call him back.   Emerita Pineda RN, BC  Manager [facility] Care Coordinator   [phone]  (Fax)

## 2020-11-30 NOTE — PROGRESS NOTES
[facility] Care Coordination Contact      [facility] Six-Month Telephone Assessment    6 month telephone assessment completed on 11/30/2020.  Epic notes reviewed, call placed to client with an , member shared no changes but continues to have right ear discomfort. Member was seen by another provider 11/25/20, but CC is not able to access this visit note. Member would like to see a specialist, refer to earlier Epic note.     ER visits: No  Hospitalizations: No  TCU stays: No  Significant health status changes: see previous note regarding right ear pain, no other changes.   Falls/Injuries: No  ADL/IADL changes: No  Changes in services: No    Caregiver Assessment follow up:  NA    Goals: See POC in chart for goal progress documentation.      Will see member in 6 months for an annual health risk assessment.   Encouraged member to call CC with any questions or concerns in the meantime.   Emerita Pineda RN, BC  Manager [facility] Care Coordinator   [phone]  (Fax)

## 2020-11-30 NOTE — TELEPHONE ENCOUNTER
Patient requesting a referral to ENT that is near where he lives as he doesn't drive very far. Patient was seen recent by Dr Esquivel for the problem and it continues. Please advise. Ok to call pt or [facility] Nurse care cord with Bryan at [phone]

## 2020-12-01 NOTE — TELEPHONE ENCOUNTER
I'm not aware of any options closer to him than the two ENT clinics in [place]. Orders entered.   Please contact Shakir

## 2020-12-02 ENCOUNTER — PATIENT OUTREACH (OUTPATIENT)
Dept: GERIATRIC MEDICINE | Facility: CLINIC | Age: 80
End: 2020-12-02

## 2020-12-15 DIAGNOSIS — I10 ESSENTIAL HYPERTENSION WITH GOAL BLOOD PRESSURE LESS THAN 140/90: ICD-10-CM

## 2020-12-15 DIAGNOSIS — E11.21 TYPE 2 DIABETES MELLITUS WITH DIABETIC NEPHROPATHY, WITHOUT LONG-TERM CURRENT USE OF INSULIN (H): ICD-10-CM

## 2020-12-15 DIAGNOSIS — L50.9 URTICARIA: ICD-10-CM

## 2020-12-16 RX ORDER — LOSARTAN POTASSIUM 100 MG/1
100 TABLET ORAL DAILY
Qty: 90 TABLET | Refills: 2 | Status: SHIPPED | OUTPATIENT
Start: 2020-12-16 | End: 2021-04-14

## 2020-12-16 NOTE — TELEPHONE ENCOUNTER
Routing refill request to provider for review/approval because:  Drug not on the [facility] refill protocol due to pt age  Christine Romero RN, BSN

## 2020-12-17 RX ORDER — CETIRIZINE HYDROCHLORIDE 10 MG/1
10 TABLET ORAL DAILY
Qty: 90 TABLET | Refills: 0 | Status: SHIPPED | OUTPATIENT
Start: 2020-12-17 | End: 2021-05-06

## 2020-12-20 NOTE — TELEPHONE ENCOUNTER
PRIOR AUTHORIZATION DENIED    Medication: VICTORIANO CONTOUR TEST STRIPS - DENIED    Denial Date: 6/15/2018    Denial Rational: PT NEEDS TO TRY/FAIL ONETOUCH ULTRA AND ONETOUCH VERIO          Appeal Information:        Pt in bed, looking at wall, states "my friends of 30 years are here". no visitors noted in room. BP elevated?? unsure of bp accuracy. Vitals otherwise stable. Pt sweating in bed.

## 2021-01-08 DIAGNOSIS — E11.21 TYPE 2 DIABETES MELLITUS WITH DIABETIC NEPHROPATHY, WITHOUT LONG-TERM CURRENT USE OF INSULIN (H): ICD-10-CM

## 2021-01-08 DIAGNOSIS — E78.5 HYPERLIPIDEMIA LDL GOAL <100: ICD-10-CM

## 2021-01-08 LAB
ALBUMIN SERPL-MCNC: 3.9 G/DL (ref 3.4–5)
ALP SERPL-CCNC: 51 U/L (ref 40–150)
ALT SERPL W P-5'-P-CCNC: 46 U/L (ref 0–70)
ANION GAP SERPL CALCULATED.3IONS-SCNC: 7 MMOL/L (ref 3–14)
AST SERPL W P-5'-P-CCNC: 34 U/L (ref 0–45)
BILIRUB SERPL-MCNC: 0.8 MG/DL (ref 0.2–1.3)
BUN SERPL-MCNC: 10 MG/DL (ref 7–30)
CALCIUM SERPL-MCNC: 8.8 MG/DL (ref 8.5–10.1)
CHLORIDE SERPL-SCNC: 107 MMOL/L (ref 94–109)
CHOLEST SERPL-MCNC: 134 MG/DL
CO2 SERPL-SCNC: 24 MMOL/L (ref 20–32)
CREAT SERPL-MCNC: 1.03 MG/DL (ref 0.66–1.25)
GFR SERPL CREATININE-BSD FRML MDRD: 68 ML/MIN/{1.73_M2}
GLUCOSE SERPL-MCNC: 141 MG/DL (ref 70–99)
HBA1C MFR BLD: 7.5 % (ref 0–5.6)
HDLC SERPL-MCNC: 45 MG/DL
LDLC SERPL CALC-MCNC: 62 MG/DL
NONHDLC SERPL-MCNC: 89 MG/DL
POTASSIUM SERPL-SCNC: 3.5 MMOL/L (ref 3.4–5.3)
PROT SERPL-MCNC: 8.7 G/DL (ref 6.8–8.8)
SODIUM SERPL-SCNC: 138 MMOL/L (ref 133–144)
TRIGL SERPL-MCNC: 137 MG/DL
TSH SERPL DL<=0.005 MIU/L-ACNC: 1.28 MU/L (ref 0.4–4)

## 2021-01-08 PROCEDURE — 36415 COLL VENOUS BLD VENIPUNCTURE: CPT | Performed by: INTERNAL MEDICINE

## 2021-01-08 PROCEDURE — 80061 LIPID PANEL: CPT | Performed by: INTERNAL MEDICINE

## 2021-01-08 PROCEDURE — 80053 COMPREHEN METABOLIC PANEL: CPT | Performed by: INTERNAL MEDICINE

## 2021-01-08 PROCEDURE — 83036 HEMOGLOBIN GLYCOSYLATED A1C: CPT | Performed by: INTERNAL MEDICINE

## 2021-01-08 PROCEDURE — 84443 ASSAY THYROID STIM HORMONE: CPT | Performed by: INTERNAL MEDICINE

## 2021-01-13 ENCOUNTER — OFFICE VISIT (OUTPATIENT)
Dept: INTERNAL MEDICINE | Facility: CLINIC | Age: 81
End: 2021-01-13
Payer: COMMERCIAL

## 2021-01-13 VITALS
HEIGHT: 60 IN | WEIGHT: 172.3 LBS | OXYGEN SATURATION: 98 % | SYSTOLIC BLOOD PRESSURE: 181 MMHG | DIASTOLIC BLOOD PRESSURE: 91 MMHG | RESPIRATION RATE: 18 BRPM | TEMPERATURE: 98.3 F | HEART RATE: 82 BPM | BODY MASS INDEX: 33.83 KG/M2

## 2021-01-13 DIAGNOSIS — E78.5 HYPERLIPIDEMIA LDL GOAL <100: ICD-10-CM

## 2021-01-13 DIAGNOSIS — H92.01 OTALGIA, RIGHT: Primary | ICD-10-CM

## 2021-01-13 DIAGNOSIS — E11.21 TYPE 2 DIABETES MELLITUS WITH DIABETIC NEPHROPATHY, WITHOUT LONG-TERM CURRENT USE OF INSULIN (H): ICD-10-CM

## 2021-01-13 DIAGNOSIS — I10 ESSENTIAL HYPERTENSION WITH GOAL BLOOD PRESSURE LESS THAN 140/90: ICD-10-CM

## 2021-01-13 PROCEDURE — 99214 OFFICE O/P EST MOD 30 MIN: CPT | Performed by: INTERNAL MEDICINE

## 2021-01-13 PROCEDURE — 99207 PR FOOT EXAM NO CHARGE: CPT | Mod: 25 | Performed by: INTERNAL MEDICINE

## 2021-01-13 RX ORDER — SIMVASTATIN 20 MG
20 TABLET ORAL AT BEDTIME
Qty: 90 TABLET | Refills: 3 | Status: SHIPPED | OUTPATIENT
Start: 2021-01-13 | End: 2021-07-14

## 2021-01-13 RX ORDER — AMLODIPINE BESYLATE 5 MG/1
5 TABLET ORAL DAILY
Qty: 90 TABLET | Refills: 3 | Status: SHIPPED | OUTPATIENT
Start: 2021-01-13 | End: 2022-01-12

## 2021-01-13 RX ORDER — ATENOLOL 50 MG/1
50 TABLET ORAL DAILY
Qty: 90 TABLET | Refills: 3 | Status: SHIPPED | OUTPATIENT
Start: 2021-01-13 | End: 2021-03-15

## 2021-01-13 RX ORDER — LANCETS
EACH MISCELLANEOUS
Qty: 200 EACH | Refills: 1 | Status: SHIPPED | OUTPATIENT
Start: 2021-01-13 | End: 2022-01-12

## 2021-01-13 ASSESSMENT — MIFFLIN-ST. JEOR: SCORE: 1336.11

## 2021-01-13 NOTE — PATIENT INSTRUCTIONS
Blood pressure a bit high today.   Add amlodipine 5 mg once a day.  Stay on losartan 100 mg per day, and  Atenolol 50 mg once per day.     Reduced the simvastatin from 40 mg to 20 mg each day.   The amlodipine may sometimes raise the simvastatin level slightly.     Diabetes and A1c look great.     Recommend seeing the ear doctor (ENT) because you are still having right ear symptoms.     See me back in about three months.

## 2021-01-13 NOTE — PROGRESS NOTES
Assessment & Plan   (H92.01) Otalgia, right  (primary encounter diagnosis)  Comment: Persistent symptoms for months. Recently prescribed Augmentin. Urged ENT consult.   Plan: OTOLARYNGOLOGY REFERRAL          (I10) Essential hypertension with goal blood pressure less than 140/90  Comment: BP elevated. Add amlodipine to atenolol and losartan.   Plan: atenolol (TENORMIN) 50 MG tablet, amLODIPine         (NORVASC) 5 MG tablet          (E78.5) Hyperlipidemia LDL goal <100  Comment: LDL at target. Continue current meds.   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 Continue current measures.   Plan: Microlet Lancets MISC             Patient Instructions   Blood pressure a bit high today.   Add amlodipine 5 mg once a day.  Stay on losartan 100 mg per day, and  Atenolol 50 mg once per day.     Reduced the simvastatin from 40 mg to 20 mg each day.   The amlodipine may sometimes raise the simvastatin level slightly.     Diabetes and A1c look great.     Recommend seeing the ear doctor (ENT) because you are still having right ear symptoms.     See me back in about three months.       Return in about 3 months (around 4/13/2021) for Routine Visit.    Liam Esquivel MD,   [facility]    Sarabjit Saunders is a 80 year old who presents to clinic today for the following health issues  accompanied by his :  Patient is being seen for an Diabetic follow up ( hypertension and diabetes). Also for right ear pain and hyperlipidemia.   HPI       Diabetes Follow-up    How often are you checking your blood sugar? One time daily  What time of day are you checking your blood sugars (select all that apply)?  Before meals  Have you had any blood sugars above 200?  No  Have you had any blood sugars below 70?  No    What symptoms do you notice when your blood sugar is low?   "None    What concerns do you have today about your diabetes? None     Do you have any of these symptoms? (Select all that apply)  No numbness or tingling in feet.  No redness, sores or blisters on feet.  No complaints of excessive thirst.  No reports of blurry vision.  No significant changes to weight.    Have you had a diabetic eye exam in the last 12 months? No        BP Readings from Last 2 Encounters:   08/14/20 132/60   02/26/20 138/72     Hemoglobin A1C (%)   Date Value   01/08/2021 7.5 (H)   08/13/2020 7.8 (H)     LDL Cholesterol Calculated (mg/dL)   Date Value   01/08/2021 62   02/19/2020 58               Hypertension Follow-up      Do you check your blood pressure regularly outside of the clinic? Yes     Are you following a low salt diet? Yes    Are your blood pressures ever more than 140 on the top number (systolic) OR more   than 90 on the bottom number (diastolic), for example 140/90? Yes      Hyperlipidemia Follow-Up      How many servings of fruits and vegetables do you eat daily?  0-1    On average, how many sweetened beverages do you drink each day (Examples: soda, juice, sweet tea, etc.  Do NOT count diet or artificially sweetened beverages)?   1    How many days per week do you exercise enough to make your heart beat faster? 5 days    How many minutes a day do you exercise enough to make your heart beat faster? 20 - 29    How many days per week do you miss taking your medication? 0    He notes that his \"ear bothers\" him \"when blood pressure goes up\". He has had some right ear discomfort for months.   He was treated for possible otitis externa and media at appointment with me in mid-August.     He reports that his glucoses have been fairly well controlled.     His BP appears elevated on current medications.     Recent LDL-Cholesterol appears to be at target on current statin therapy.     Past medical, family and social histories as well as medications reviewed and updated as needed.    Review of " "Systems   No dyspnea or cough. No chest discomfort, dizziness or palpitations. No diarrhea, abdominal pain or rectal bleeding.   No acute problems with vision or speech, lateralizing weakness or paresthesias.    ROS: as above or negative for Respiratory, CV, GI, endocrine, neuro systems.       Vitals: BP (!) 181/91   Pulse 82   Temp 98.3  F (36.8  C) (Oral)   Resp 18   Ht 1.511 m (4' 11.5\")   Wt 78.2 kg (172 lb 4.8 oz)   SpO2 98%   BMI 34.22 kg/m    BMI= Body mass index is 34.22 kg/m .    Physical Exam   GENERAL: healthy, alert and no distress  Ears: Right TM intact, perhaps slightly reduced light reflex, no obvious erythema. Left TM clear.   NECK: no adenopathy, no asymmetry, masses, or scars and thyroid normal to palpation  RESP: lungs clear to auscultation - no rales, rhonchi or wheezes  CV: regular rate and rhythm, normal S1 S2, no S3 or S4, no murmur, click or rub, no peripheral edema and peripheral pulses strong  MS: no gross musculoskeletal defects noted, no edema  Diabetic foot exam: normal DP and PT pulses, no trophic changes or ulcerative lesions and normal sensory exam    Lab Results   Component Value Date    A1C 7.5 01/08/2021    A1C 7.8 08/13/2020    A1C 8.1 05/20/2020    A1C 8.5 02/19/2020    A1C 7.9 09/13/2019     LDL Cholesterol Calculated   Date Value Ref Range Status   01/08/2021 62 <100 mg/dL Final     Comment:     Desirable:       <100 mg/dl                "

## 2021-01-25 ENCOUNTER — TRANSFERRED RECORDS (OUTPATIENT)
Dept: HEALTH INFORMATION MANAGEMENT | Facility: CLINIC | Age: 81
End: 2021-01-25

## 2021-02-15 ENCOUNTER — TRANSFERRED RECORDS (OUTPATIENT)
Dept: HEALTH INFORMATION MANAGEMENT | Facility: CLINIC | Age: 81
End: 2021-02-15

## 2021-03-01 ENCOUNTER — IMMUNIZATION (OUTPATIENT)
Dept: NURSING | Facility: CLINIC | Age: 81
End: 2021-03-01
Payer: COMMERCIAL

## 2021-03-01 PROCEDURE — 0001A PR COVID VAC PFIZER DIL RECON 30 MCG/0.3 ML IM: CPT

## 2021-03-01 PROCEDURE — 91300 PR COVID VAC PFIZER DIL RECON 30 MCG/0.3 ML IM: CPT

## 2021-03-12 DIAGNOSIS — E11.21 TYPE 2 DIABETES MELLITUS WITH DIABETIC NEPHROPATHY, WITHOUT LONG-TERM CURRENT USE OF INSULIN (H): ICD-10-CM

## 2021-03-12 DIAGNOSIS — I10 ESSENTIAL HYPERTENSION WITH GOAL BLOOD PRESSURE LESS THAN 140/90: ICD-10-CM

## 2021-03-12 NOTE — TELEPHONE ENCOUNTER
Pending Prescriptions:                       Disp   Refills    atenolol (TENORMIN) 50 MG tablet [Pharmacy*90 tab*3        Sig: TAKE 1 TABLET(50 MG) BY MOUTH DAILY    metFORMIN (GLUCOPHAGE) 500 MG tablet [Phar*360 ta*1        Sig: TAKE 2 TABLETS BY MOUTH EVERY MORNING AND 1 TO 2           TABLETS EACH SUPPER AS DIRECTED    Routing refill request to provider for review/approval because:  BP Readings from Last 3 Encounters:   01/13/21 (!) 181/91   08/14/20 132/60   02/26/20 138/72

## 2021-03-15 RX ORDER — ATENOLOL 50 MG/1
TABLET ORAL
Qty: 90 TABLET | Refills: 3 | Status: SHIPPED | OUTPATIENT
Start: 2021-03-15 | End: 2022-01-12

## 2021-03-22 ENCOUNTER — IMMUNIZATION (OUTPATIENT)
Dept: NURSING | Facility: CLINIC | Age: 81
End: 2021-03-22
Attending: INTERNAL MEDICINE
Payer: COMMERCIAL

## 2021-03-22 DIAGNOSIS — E11.21 TYPE 2 DIABETES MELLITUS WITH DIABETIC NEPHROPATHY, WITHOUT LONG-TERM CURRENT USE OF INSULIN (H): ICD-10-CM

## 2021-03-22 PROCEDURE — 91300 PR COVID VAC PFIZER DIL RECON 30 MCG/0.3 ML IM: CPT

## 2021-03-22 PROCEDURE — 0002A PR COVID VAC PFIZER DIL RECON 30 MCG/0.3 ML IM: CPT

## 2021-03-22 NOTE — TELEPHONE ENCOUNTER
Medication Question or Refill    Who is calling: [facility] #[id number] [place] [address] [address] AT [facility] [address]    What medication are you calling about (include dose and sig)?: blood glucose monitoring (ONE TOUCH DELICA) lancets    Controlled Substance Agreement on file:     Who prescribed the medication?: Ganzer    Do you need a refill? Yes: blood glucose monitoring (ONE TOUCH DELICA) lancets    When did you use the medication last?     Patient offered an appointment? No    Do you have any questions or concerns?  No    Requested Pharmacy: [facility] #[id number] [place] [address] [address] [facility] [address]    Okay to leave a detailed message?: Yes at Cell number on file:    Telephone Information:   Mobile [phone]

## 2021-03-25 ENCOUNTER — DOCUMENTATION ONLY (OUTPATIENT)
Dept: INTERNAL MEDICINE | Facility: CLINIC | Age: 81
End: 2021-03-25

## 2021-03-29 ENCOUNTER — DOCUMENTATION ONLY (OUTPATIENT)
Dept: INTERNAL MEDICINE | Facility: CLINIC | Age: 81
End: 2021-03-29

## 2021-03-29 DIAGNOSIS — E11.21 TYPE 2 DIABETES MELLITUS WITH DIABETIC NEPHROPATHY, WITHOUT LONG-TERM CURRENT USE OF INSULIN (H): Primary | ICD-10-CM

## 2021-04-05 DIAGNOSIS — E11.21 TYPE 2 DIABETES MELLITUS WITH DIABETIC NEPHROPATHY, WITHOUT LONG-TERM CURRENT USE OF INSULIN (H): ICD-10-CM

## 2021-04-05 LAB
CREAT UR-MCNC: 187 MG/DL
HBA1C MFR BLD: 8.4 % (ref 0–5.6)
MICROALBUMIN UR-MCNC: 718 MG/L
MICROALBUMIN/CREAT UR: 383.96 MG/G CR (ref 0–17)

## 2021-04-05 PROCEDURE — 82043 UR ALBUMIN QUANTITATIVE: CPT | Performed by: INTERNAL MEDICINE

## 2021-04-05 PROCEDURE — 36415 COLL VENOUS BLD VENIPUNCTURE: CPT | Performed by: INTERNAL MEDICINE

## 2021-04-05 PROCEDURE — 83036 HEMOGLOBIN GLYCOSYLATED A1C: CPT | Performed by: INTERNAL MEDICINE

## 2021-04-14 ENCOUNTER — OFFICE VISIT (OUTPATIENT)
Dept: INTERNAL MEDICINE | Facility: CLINIC | Age: 81
End: 2021-04-14
Payer: COMMERCIAL

## 2021-04-14 VITALS
OXYGEN SATURATION: 97 % | DIASTOLIC BLOOD PRESSURE: 72 MMHG | RESPIRATION RATE: 18 BRPM | TEMPERATURE: 98.4 F | HEIGHT: 60 IN | BODY MASS INDEX: 34.46 KG/M2 | HEART RATE: 85 BPM | SYSTOLIC BLOOD PRESSURE: 148 MMHG | WEIGHT: 175.5 LBS

## 2021-04-14 DIAGNOSIS — I10 ESSENTIAL HYPERTENSION WITH GOAL BLOOD PRESSURE LESS THAN 140/90: ICD-10-CM

## 2021-04-14 DIAGNOSIS — M25.561 RIGHT KNEE PAIN, UNSPECIFIED CHRONICITY: ICD-10-CM

## 2021-04-14 DIAGNOSIS — E66.01 MORBID OBESITY (H): ICD-10-CM

## 2021-04-14 DIAGNOSIS — E11.21 TYPE 2 DIABETES MELLITUS WITH DIABETIC NEPHROPATHY, WITHOUT LONG-TERM CURRENT USE OF INSULIN (H): Primary | ICD-10-CM

## 2021-04-14 DIAGNOSIS — E78.5 HYPERLIPIDEMIA LDL GOAL <100: ICD-10-CM

## 2021-04-14 PROCEDURE — 99214 OFFICE O/P EST MOD 30 MIN: CPT | Performed by: INTERNAL MEDICINE

## 2021-04-14 RX ORDER — HYDROCHLOROTHIAZIDE 12.5 MG/1
12.5 CAPSULE ORAL DAILY
Qty: 90 CAPSULE | Refills: 1 | Status: SHIPPED | OUTPATIENT
Start: 2021-04-14 | End: 2021-07-14

## 2021-04-14 RX ORDER — LOSARTAN POTASSIUM 100 MG/1
100 TABLET ORAL DAILY
Qty: 90 TABLET | Refills: 3 | Status: SHIPPED | OUTPATIENT
Start: 2021-04-14 | End: 2022-01-12

## 2021-04-14 RX ORDER — LIDOCAINE 50 MG/G
PATCH TOPICAL
Qty: 30 PATCH | Refills: 3 | Status: SHIPPED | OUTPATIENT
Start: 2021-04-14 | End: 2024-02-13

## 2021-04-14 ASSESSMENT — MIFFLIN-ST. JEOR: SCORE: 1345.62

## 2021-04-14 NOTE — PROGRESS NOTES
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 He was not interested in seeing a diabetes educator again. We tried glimepiride in the past but this caused him to feel shaky and hunger, so he stopped taking this medication. At today's visit he was not interested in starting an injectable. We discussed working on eating habits and continuing to exercise often and we will recheck an A1C in 3 months.   Plan: losartan (COZAAR) 100 MG tablet, **A1C FUTURE         3mo            (I10) Essential hypertension with goal blood pressure less than 140/90  Comment: BP today was 148/72. Many of his recorded home BP's were above 140/90. We will start him on hydrochlorothiazide 12.5 MG today on top of his losartan and will recheck BP in 3 months.  Plan: losartan (COZAAR) 100 MG tablet,         hydrochlorothiazide (MICROZIDE) 12.5 MG capsule            (E78.5) Hyperlipidemia LDL goal <100  Comment: LDL not checked at today's visit. Last LDL taken on 1/8/21 was at goal at 62. Will continue current dose of simvastatin 20 MG.    (M25.561) Right knee pain, unspecified chronicity  Plan: lidocaine (LIDODERM) 5 % patch      Patient Instructions   For the high blood pressure, let's add one new medication called hydrochlorothiazide 12.5 mg, one capsule each day. Stay on the other blood pressure medications.     The LDL-Cholesterol looks fine--stay on the same dose of simvastatin.     Diabetes not as well-controlled as in January--A1c now up from 7.5 to 8.4.  Continue same dose of metformin. The other pill for diabetes caused you to have low glucoses in the past.  Continue good exercise habits.   Try to reduce food portions.     See me back in three months, with a repeat A1c blood test  "a few days in advance.     If A1c is still over 8.0, we can talk more about a once weekly injectable medication that can reduce A1c, also reduce appetite and weight.         BMI:   Estimated body mass index is 34.85 kg/m  as calculated from the following:    Height as of this encounter: 1.511 m (4' 11.5\").    Weight as of this encounter: 79.6 kg (175 lb 8 oz).   Weight management plan: Discussed healthy diet and exercise guidelines      Return in about 3 months (around 7/14/2021) for Diabetes Recheck.    Shruthi Lentz  Nurse Practitioner Student    I have reviewed the patient's history and exam with Shruthi Lentz. I have interviewed and examined the patient independently. Agree with above note including Assessment and Plan.    Liam Esquivel MD  [facility]          Sarabjit Saunders is a 81 year old who presents for the following health issues:  Patient is being seen for an 3 month follow up.  HPI     ( used for today's visit)  We discussed that his A1c and blood pressure are elevated.    Pt takes his BG at home 1x/day in the morning before breakfast, running between 126-153. BP's over the past few weeks have been 130-149/79-92. He has been exercising about 30-50 minutes almost every day. He noted maybe increasing the amount of snacking over the past few months, otherwise he doesn't recall any diet changes that may have caused the increase in his A1C.     Diabetes Follow-up    How often are you checking your blood sugar? One time daily  What time of day are you checking your blood sugars (select all that apply)?  Before meals  Have you had any blood sugars above 200?  No  Have you had any blood sugars below 70?  No    What symptoms do you notice when your blood sugar is low?  None    What concerns do you have today about your diabetes? None     Do you have any of these symptoms? (Select all that apply)  No numbness or tingling in feet.  No redness, sores or blisters on feet.  No " complaints of excessive thirst.  No reports of blurry vision.  No significant changes to weight.    Have you had a diabetic eye exam in the last 12 months? No      Hyperlipidemia Follow-Up      Are you regularly taking any medication or supplement to lower your cholesterol?   Yes- simvastatin    Are you having muscle aches or other side effects that you think could be caused by your cholesterol lowering medication?  No    Hypertension Follow-up      Do you check your blood pressure regularly outside of the clinic? Yes     Are you following a low salt diet? Yes    Are your blood pressures ever more than 140 on the top number (systolic) OR more   than 90 on the bottom number (diastolic), for example 140/90? No    BP Readings from Last 2 Encounters:   01/13/21 (!) 181/91   08/14/20 132/60     Hemoglobin A1C (%)   Date Value   04/05/2021 8.4 (H)   01/08/2021 7.5 (H)     LDL Cholesterol Calculated (mg/dL)   Date Value   01/08/2021 62   02/19/2020 58         How many servings of fruits and vegetables do you eat daily?  0-1    On average, how many sweetened beverages do you drink each day (Examples: soda, juice, sweet tea, etc.  Do NOT count diet or artificially sweetened beverages)?   0    How many days per week do you exercise enough to make your heart beat faster? 3 or less    How many minutes a day do you exercise enough to make your heart beat faster? 9 or less    How many days per week do you miss taking your medication? 0      Past medical, family and social histories as well as medications reviewed and updated as needed.      Review of Systems   Constitutional: Negative for chills and fever.   HENT: Negative for ear pain.    Eyes: Negative for visual disturbance.   Respiratory: Negative for cough, chest tightness and shortness of breath.    Cardiovascular: Negative for chest pain and palpitations.   Musculoskeletal: Negative for arthralgias.   Neurological: Negative for speech difficulty and weakness.         "  Objective    BP (!) 148/72   Pulse 85   Temp 98.4  F (36.9  C) (Oral)   Resp 18   Ht 1.511 m (4' 11.5\")   Wt 79.6 kg (175 lb 8 oz)   SpO2 97%   BMI 34.85 kg/m       Physical Exam   GENERAL: healthy, alert and no distress  EYES: Eyes grossly normal to inspection, PERRL and conjunctivae and sclerae normal  RESP: lungs clear to auscultation - no rales, rhonchi or wheezes  CV: regular rate and rhythm, normal S1 S2, no S3 or S4, no murmur, click or rub, no peripheral edema and peripheral pulses strong  NEURO: Normal strength and tone, mentation intact and speech normal  PSYCH: mentation appears normal, affect normal/bright      Hemoglobin A1C   Date Value Ref Range Status   04/05/2021 8.4 (H) 0 - 5.6 % Final     Comment:     Normal <5.7% Prediabetes 5.7-6.4%  Diabetes 6.5% or higher - adopted from ADA   consensus guidelines.  Results confirmed by repeat test     01/08/2021 7.5 (H) 0 - 5.6 % Final     Comment:     Normal <5.7% Prediabetes 5.7-6.4%  Diabetes 6.5% or higher - adopted from ADA   consensus guidelines.     08/13/2020 7.8 (H) 0 - 5.6 % Final     Comment:     Normal <5.7% Prediabetes 5.7-6.4%  Diabetes 6.5% or higher - adopted from ADA   consensus guidelines.               "

## 2021-04-14 NOTE — PATIENT INSTRUCTIONS
For the high blood pressure, let's add one new medication called hydrochlorothiazide 12.5 mg, one capsule each day. Stay on the other blood pressure medications.     The LDL-Cholesterol looks fine--stay on the same dose of simvastatin.     Diabetes not as well-controlled as in January--A1c now up from 7.5 to 8.4.  Continue same dose of metformin. The other pill for diabetes caused you to have low glucoses in the past.  Continue good exercise habits.   Try to reduce food portions.     See me back in three months, with a repeat A1c blood test a few days in advance.     If A1c is still over 8.0, we can talk more about a once weekly injectable medication that can reduce A1c, also reduce appetite and weight.

## 2021-04-15 ENCOUNTER — TELEPHONE (OUTPATIENT)
Dept: INTERNAL MEDICINE | Facility: CLINIC | Age: 81
End: 2021-04-15

## 2021-04-15 ASSESSMENT — ENCOUNTER SYMPTOMS
SHORTNESS OF BREATH: 0
PALPITATIONS: 0
WEAKNESS: 0
SPEECH DIFFICULTY: 0
CHEST TIGHTNESS: 0
ARTHRALGIAS: 0
CHILLS: 0
FEVER: 0
COUGH: 0

## 2021-04-15 NOTE — TELEPHONE ENCOUNTER
[facility] calling to verify quantity and sig of lidocaine patches. Writer advised of dispense 30 patches with 3 refills. Sig noted apply 3 patches to affected area on 12h off 12h.    Pharmacy stated an understanding and agreed with plan.      Lewis PIERSON RN   [facility]

## 2021-04-30 ENCOUNTER — PATIENT OUTREACH (OUTPATIENT)
Dept: GERIATRIC MEDICINE | Facility: CLINIC | Age: 81
End: 2021-04-30

## 2021-04-30 NOTE — PROGRESS NOTES
[facility] Care Coordination Contact    Called member to schedule annual HRA home visit. HRA has been scheduled for 5/3/2021 @ 2 PM.   Emerita Pineda RN, BC  Manager [facility] Care Coordinator   [phone]  (Fax)

## 2021-05-02 DIAGNOSIS — L50.9 URTICARIA: ICD-10-CM

## 2021-05-03 ENCOUNTER — PATIENT OUTREACH (OUTPATIENT)
Dept: GERIATRIC MEDICINE | Facility: CLINIC | Age: 81
End: 2021-05-03

## 2021-05-03 ASSESSMENT — ACTIVITIES OF DAILY LIVING (ADL): DEPENDENT_IADLS:: CLEANING;LAUNDRY;SHOPPING

## 2021-05-03 NOTE — PROGRESS NOTES
[facility] Care Coordination Contact    [facility] Visit Assessment     Health Risk Assessment with Chip Shanks completed on May 3, 2021. The assessment completed remotely due to COVID     Type of residence:: Apartment  Current living arrangement:: I live in a private home with family. Chip resides with his son in an apt on the second floor. Chip no longer wishes to move to the first floor. Chip shared that he can move to his daughter's home at anytime.      Assessment completed with:: Patient, [place]  through [facility].     Current Care Plan  Member currently receiving the following home care services:   None  Member currently receiving the following community resources: Lifeline, Transportation Services ([employer] bus passes)      Medication Review  Medication reconciliation completed in Epic: Yes  Medication set-up & administration: Independent and sets up on own weekly.  Self-administers medications.  Medication Risk Assessment Medication (1 or more, place referral to MTM): N/A: No risk factors identified  MTM Referral Placed: No: Encouraged, Chip declined at this time.    Mental/Behavioral Health   Depression Screening:   PHQ-2 Total Score (Adult) - Positive if 3 or more points; Administer PHQ-9 if positive: 0       Mental health DX:: Yes   Mental health DX how managed:: None    Falls Assessment:   Fallen 2 or more times in the past year?: No   Any fall with injury in the past year?: No    ADL/IADL Dependencies:   Dependent ADLs:: Ambulation-cane  Dependent IADLs:: Cleaning, Laundry, Shopping    [facility] Plan sponsored benefits: Shared information re: Silver Sneakers/gym memberships, ASA, Calcium +D. Chip stated that he will resume the [employer] in a short while.     PCA Assessment completed at visit: No     Elderly Waiver Eligibility: Yes-will continue on EW  [employer]eline and [employer] bus passes  Chip does not think his Lifeline is not working. Conference call to JYOTHI  member informed that his 3G network has sunset and a new unit will be mailed to his home, he should expect to receive in 3-4 days.     Care Plan & Recommendations:   Discussed DM Education and [employer]'s supplemental benefit for Nutritional Counseling-Chip declines at this time. Chip states that he is decreasing noodles and rice and increasing exercise to twice per day.       See [facility] for detailed assessment information.    Follow-Up Plan: Member informed of future contact, plan to f/u with member with a 6 month telephone assessment.  Contact information shared with member and family, encouraged member to call with any questions or concerns at any time.    [place] care continuum providers: Please refer to Health Care Home on the Epic Problem List to view this patient's [facility] Care Plan Summary.  Emerita Pineda RN, BC  Manager [facility] Care Coordinator   [phone]  (Fax)

## 2021-05-04 ENCOUNTER — PATIENT OUTREACH (OUTPATIENT)
Dept: GERIATRIC MEDICINE | Facility: CLINIC | Age: 81
End: 2021-05-04

## 2021-05-04 NOTE — LETTER
May 4, 2021      CHIP DUMONT  [address] [place] [id number]      Dear Chip:    At [facility], we are dedicated to improving your health and well-being. Enclosed is the Comprehensive Care Plan that we developed with you on 05/03/2021. Please review the Care Plan carefully.    As a reminder, some of the things we discussed at your visit include:    Your physical and mental health    Ways to reduce falls    Health care needs you may have    Don t forget to contact your care coordinator if you:    Have been hospitalized or plan to be hospitalized     Have had a fall     Have experienced a change in physical health    Are experiencing emotional problems     If you do not agree with your Care Plan, have questions about it, or have experienced a change in your needs, please call me at [phone]. If you are hearing impaired, please call the [place] Relay at 830 or [phone] (ftrvmg-wi-npdcoy relay service).    Sincerely,    Emerita Pineda RN    E-mail: [email or web address]  Phone: [phone]      [facility]) is a health plan that contracts with both Medicare and the [place] Medical Assistance (Medicaid) program to provide benefits of both programs to enrollees. Enrollment in [facility] depends on contract renewal.    [id number])     [id number] (11/18)

## 2021-05-04 NOTE — TELEPHONE ENCOUNTER
Routing refill request to provider for review/approval because:  Patient does not meet age recommendation for RN refill per protocol

## 2021-05-04 NOTE — PROGRESS NOTES
[facility] Care Coordination Contact    Received after visit chart from care coordinator.  Completed following tasks: Mailed copy of care plan to client, Updated services in access and Submitted referrals/auths for lifeline.  No need to resubmit transportation referral - member is on tracking spreadsheet.   and Provider Signature - No POC Shared:  Member indicates that they do not want their POC shared with any EW providers.     Member was mailed a copy of the POC signature sheet to sign and return mail with a SASE.  This assessment was completed telephonically due to Covid-19.    Ana Hodges  Care Management Specialist  [facility]  [phone]

## 2021-05-06 RX ORDER — CETIRIZINE HYDROCHLORIDE 10 MG/1
TABLET ORAL
Qty: 90 TABLET | Refills: 0 | Status: SHIPPED | OUTPATIENT
Start: 2021-05-06 | End: 2021-07-14

## 2021-05-14 ENCOUNTER — TRANSFERRED RECORDS (OUTPATIENT)
Dept: HEALTH INFORMATION MANAGEMENT | Facility: CLINIC | Age: 81
End: 2021-05-14

## 2021-05-14 LAB — RETINOPATHY: NEGATIVE

## 2021-05-29 ENCOUNTER — HEALTH MAINTENANCE LETTER (OUTPATIENT)
Age: 81
End: 2021-05-29

## 2021-07-07 DIAGNOSIS — E11.21 TYPE 2 DIABETES MELLITUS WITH DIABETIC NEPHROPATHY, WITHOUT LONG-TERM CURRENT USE OF INSULIN (H): ICD-10-CM

## 2021-07-07 LAB — HBA1C MFR BLD: 7.2 % (ref 0–5.6)

## 2021-07-07 PROCEDURE — 36415 COLL VENOUS BLD VENIPUNCTURE: CPT | Performed by: INTERNAL MEDICINE

## 2021-07-07 PROCEDURE — 83036 HEMOGLOBIN GLYCOSYLATED A1C: CPT | Performed by: INTERNAL MEDICINE

## 2021-07-14 ENCOUNTER — PATIENT OUTREACH (OUTPATIENT)
Dept: GERIATRIC MEDICINE | Facility: CLINIC | Age: 81
End: 2021-07-14

## 2021-07-14 ENCOUNTER — OFFICE VISIT (OUTPATIENT)
Dept: INTERNAL MEDICINE | Facility: CLINIC | Age: 81
End: 2021-07-14
Payer: COMMERCIAL

## 2021-07-14 VITALS
HEART RATE: 77 BPM | WEIGHT: 165 LBS | RESPIRATION RATE: 16 BRPM | BODY MASS INDEX: 32.39 KG/M2 | DIASTOLIC BLOOD PRESSURE: 60 MMHG | TEMPERATURE: 98.8 F | OXYGEN SATURATION: 98 % | HEIGHT: 60 IN | SYSTOLIC BLOOD PRESSURE: 124 MMHG

## 2021-07-14 DIAGNOSIS — I10 ESSENTIAL HYPERTENSION WITH GOAL BLOOD PRESSURE LESS THAN 140/90: ICD-10-CM

## 2021-07-14 DIAGNOSIS — E78.5 HYPERLIPIDEMIA LDL GOAL <100: ICD-10-CM

## 2021-07-14 DIAGNOSIS — L50.9 URTICARIA: ICD-10-CM

## 2021-07-14 DIAGNOSIS — E11.21 TYPE 2 DIABETES MELLITUS WITH DIABETIC NEPHROPATHY, WITHOUT LONG-TERM CURRENT USE OF INSULIN (H): Primary | ICD-10-CM

## 2021-07-14 DIAGNOSIS — K21.9 GASTROESOPHAGEAL REFLUX DISEASE WITHOUT ESOPHAGITIS: ICD-10-CM

## 2021-07-14 PROCEDURE — 99214 OFFICE O/P EST MOD 30 MIN: CPT | Performed by: INTERNAL MEDICINE

## 2021-07-14 RX ORDER — SIMVASTATIN 20 MG
20 TABLET ORAL AT BEDTIME
Qty: 90 TABLET | Refills: 3 | Status: SHIPPED | OUTPATIENT
Start: 2021-07-14 | End: 2022-01-07

## 2021-07-14 RX ORDER — HYDROCHLOROTHIAZIDE 12.5 MG/1
12.5 CAPSULE ORAL DAILY
Qty: 90 CAPSULE | Refills: 3 | Status: SHIPPED | OUTPATIENT
Start: 2021-07-14 | End: 2021-09-28

## 2021-07-14 RX ORDER — LATANOPROST 50 UG/ML
SOLUTION/ DROPS OPHTHALMIC
COMMUNITY
Start: 2021-05-03

## 2021-07-14 RX ORDER — FAMOTIDINE 20 MG/1
20 TABLET, FILM COATED ORAL DAILY
Qty: 90 TABLET | Refills: 3 | Status: SHIPPED | OUTPATIENT
Start: 2021-07-14 | End: 2022-07-12

## 2021-07-14 RX ORDER — CETIRIZINE HYDROCHLORIDE 10 MG/1
10 TABLET ORAL DAILY
Qty: 90 TABLET | Refills: 3 | Status: SHIPPED | OUTPATIENT
Start: 2021-07-14 | End: 2022-10-02

## 2021-07-14 ASSESSMENT — MIFFLIN-ST. JEOR: SCORE: 1298

## 2021-07-14 NOTE — PROGRESS NOTES
Assessment & Plan   (E11.21) Type 2 diabetes mellitus with diabetic nephropathy, without long-term current use of insulin (H)  (primary encounter diagnosis)  Comment: Improved and Well controlled. Continue current measures.  Plan: metFORMIN (GLUCOPHAGE) 500 MG tablet, blood         glucose (NO BRAND SPECIFIED) lancets standard          (E78.5) Hyperlipidemia LDL goal <100  Comment: Last LDL at target. Continue current meds.   Plan: simvastatin (ZOCOR) 20 MG tablet          (I10) Essential hypertension with goal blood pressure less than 140/90  Comment: BP at target. Continue current meds.   Plan: hydrochlorothiazide (MICROZIDE) 12.5 MG capsule          (L50.9) Urticaria  Comment: Refilled Zyrtec.   Plan: cetirizine (ZYRTEC) 10 MG tablet          (K21.9) Gastroesophageal reflux disease without esophagitis  Comment: Well controlled. Continue current meds.   Plan: famotidine (PEPCID) 20 MG tablet             Patient Instructions   Labs look great!    No medication changes needed today.   Refilled all needed medications.     Follow-up with me in six months with lab tests a few days in advance.      Return in about 6 months (around 1/14/2022) for Diabetes Recheck.    Liam Esquivel MD, MD  [facility] MIGUELANGEL Saunders is a 81 year old who presents for the following health issues : follow up    HPI     Diabetes Follow-up    How often are you checking your blood sugar? One time daily  What time of day are you checking your blood sugars (select all that apply)?  Before meals  Have you had any blood sugars above 200?  No  Have you had any blood sugars below 70?  No    What symptoms do you notice when your blood sugar is low?  None    What concerns do you have today about your diabetes? None     Do you have any of these symptoms? (Select all that apply)  No numbness or tingling in feet.  No redness, sores or blisters on feet.  No complaints of excessive thirst.  No reports of blurry vision.  No  significant changes to weight.      BP Readings from Last 2 Encounters:   07/14/21 124/60   04/14/21 (!) 148/72     Hemoglobin A1C (%)   Date Value   07/07/2021 7.2 (H)   04/05/2021 8.4 (H)     LDL Cholesterol Calculated (mg/dL)   Date Value   01/08/2021 62   02/19/2020 58         Hyperlipidemia Follow-Up      Are you regularly taking any medication or supplement to lower your cholesterol?   Yes- Simvastatin    Are you having muscle aches or other side effects that you think could be caused by your cholesterol lowering medication?  No    Hypertension Follow-up      Do you check your blood pressure regularly outside of the clinic? Yes     Are you following a low salt diet? Yes    Are your blood pressures ever more than 140 on the top number (systolic) OR more   than 90 on the bottom number (diastolic), for example 140/90? No      How many servings of fruits and vegetables do you eat daily?  2-3    On average, how many sweetened beverages do you drink each day (Examples: soda, juice, sweet tea, etc.  Do NOT count diet or artificially sweetened beverages)?   1    How many days per week do you exercise enough to make your heart beat faster? 7    How many minutes a day do you exercise enough to make your heart beat faster? 30 - 60    How many days per week do you miss taking your medication? 0    The patient reports working harder on eating and activity, and has lost about eight pounds.      BP and most recent LDL-Cholesterol at target.     He is provided refills of several medications.   Zyrtec has been helpful for urticaria, and famotidine has helped control reflux symptoms.     Past medical, family and social histories as well as medications reviewed and updated as needed.    Review of Systems   No dyspnea or cough. No chest discomfort, dizziness or palpitations. No diarrhea, abdominal pain or rectal bleeding.   No acute problems with vision or speech, lateralizing weakness or paresthesias.    ROS: as above or  "negative for Respiratory, CV, GI, endocrine, neuro systems.       Objective    /60   Pulse 77   Temp 98.8  F (37.1  C) (Oral)   Resp 16   Ht 1.511 m (4' 11.5\")   Wt 74.8 kg (165 lb)   SpO2 98%   BMI 32.77 kg/m    Body mass index is 32.77 kg/m .     Physical Exam   GENERAL: healthy, alert and no distress  RESP: lungs clear to auscultation - no rales, rhonchi or wheezes  CV: regular rate and rhythm, normal S1 S2, no S3 or S4, no murmur, click or rub, no peripheral edema and peripheral pulses strong  MS: no gross musculoskeletal defects noted, no edema           "

## 2021-07-14 NOTE — PATIENT INSTRUCTIONS
Labs look great!    No medication changes needed today.   Refilled all needed medications.     Follow-up with me in six months with lab tests a few days in advance.

## 2021-07-14 NOTE — PROGRESS NOTES
[facility] Care Coordination Contact    Rec'd vm from member requesting a return call, stating that the clinic is requesting his Medicare card.  Call placed to member, he does have his Medicare card and will provide both [employer] and Medicare card to the clinic. Explained that all billing for Medicare should go directly to [facility].  Emerita Pineda RN, BC  Manager [facility] Care Coordinator   [phone]  (Fax)

## 2021-07-15 NOTE — PROGRESS NOTES
[facility] Care Coordination Contact    CC received request from member's current CC on calling member regarding his Medicare. CC left a voice mail message for member to call to discuss.    Yoav Quintana RN Care Coordinator  [facility] Care Coordination  Office: [phone]  Fax: [phone]  [email or web address]

## 2021-07-15 NOTE — PROGRESS NOTES
[facility] Care Coordination Contact    CC received a returned call from member. CC provided the new Medicare number to member. He was able to repeat back for clarification. He shared that his current card has old number on it. CC recommended if he wants a new card, he will need to have someone to call on his behalf to request a card with Medicare. He stated he will have his daughter call.       Yoav Quintana RN Care Coordinator  [facility] Care Coordination  Office: [phone]  Fax: [phone]  [email or web address]

## 2021-08-20 DIAGNOSIS — E11.21 TYPE 2 DIABETES MELLITUS WITH DIABETIC NEPHROPATHY, WITHOUT LONG-TERM CURRENT USE OF INSULIN (H): ICD-10-CM

## 2021-08-20 DIAGNOSIS — I10 ESSENTIAL HYPERTENSION WITH GOAL BLOOD PRESSURE LESS THAN 140/90: ICD-10-CM

## 2021-08-20 RX ORDER — LOSARTAN POTASSIUM 100 MG/1
TABLET ORAL
Qty: 90 TABLET | Refills: 3 | OUTPATIENT
Start: 2021-08-20

## 2021-09-18 ENCOUNTER — HEALTH MAINTENANCE LETTER (OUTPATIENT)
Age: 81
End: 2021-09-18

## 2021-10-29 ENCOUNTER — PATIENT OUTREACH (OUTPATIENT)
Dept: GERIATRIC MEDICINE | Facility: CLINIC | Age: 81
End: 2021-10-29

## 2021-10-29 NOTE — PROGRESS NOTES
"[facility] Care Coordination Contact      [facility] Six-Month Telephone Assessment    6 month telephone assessment completed on 10/29/2021.  Epic notes reviewed. Call placed to member with an . Member reports that he is doing well, \"physically strong and healthy.\" Member shared occasional loose stools, uses OTC Pepto Bismol which is helpful. Enc'd member to review with PCP if ongoing loose stools.   Member shared that he will work with [facility] to discuss COVID Booster vaccine. Member will pursue flu vaccine.     ER visits: No  Hospitalizations: No  TCU stays: No  Significant health status changes: None reported  Falls/Injuries: No  ADL/IADL changes: No  Changes in services: No    Caregiver Assessment follow up:  NA     Goals: See POC in chart for goal progress documentation.      Will see member in 6 months for an annual health risk assessment.   Encouraged member to call CC with any questions or concerns in the meantime.   Emerita Pineda RN, BC  Manager [facility] Care Coordinator   [phone]  (Fax)          "

## 2021-12-07 DIAGNOSIS — E11.21 TYPE 2 DIABETES MELLITUS WITH DIABETIC NEPHROPATHY, WITHOUT LONG-TERM CURRENT USE OF INSULIN (H): ICD-10-CM

## 2021-12-07 RX ORDER — BLOOD SUGAR DIAGNOSTIC
STRIP MISCELLANEOUS
Qty: 200 STRIP | Refills: 1 | Status: SHIPPED | OUTPATIENT
Start: 2021-12-07 | End: 2022-11-03

## 2021-12-15 ENCOUNTER — PATIENT OUTREACH (OUTPATIENT)
Dept: GERIATRIC MEDICINE | Facility: CLINIC | Age: 81
End: 2021-12-15
Payer: COMMERCIAL

## 2021-12-15 NOTE — PROGRESS NOTES
[facility] Care Coordination Contact    Rec'd vm from member reporting his new address change.  Call placed to member, confirmed address change, member stating that he and his son moved from the apt to a new home. Member inquired if CC will contact his financial worker. Explained that CC will send a communication form with new address, and that PT PAL will inform his health plan.   [employer] [id number] faxed to Brooklynn SANCHEZ @ [employer] Epic address updated.  Emerita Pineda RN, BC  Manager [facility] Care Coordinator   [phone]  (Fax)

## 2021-12-30 NOTE — PROGRESS NOTES
[facility] Care Coordination Contact    12/29/21 Rec'd vm from member stating that he rec'd mail from [facility] at his old apt building.   12/30/21 Call placed to Chip to report that  did communicate address change to [facility] and that [facility] will send address change to [facility] 1/1/22. Request that he contact me if there are still mailing concerns after 1/1/22.   Emerita Pineda RN, BC  Manager [facility] Coordinator   [phone]  (Fax)

## 2022-01-05 ENCOUNTER — LAB (OUTPATIENT)
Dept: LAB | Facility: CLINIC | Age: 82
End: 2022-01-05
Payer: COMMERCIAL

## 2022-01-05 ENCOUNTER — PATIENT OUTREACH (OUTPATIENT)
Dept: GERIATRIC MEDICINE | Facility: CLINIC | Age: 82
End: 2022-01-05

## 2022-01-05 DIAGNOSIS — E11.21 TYPE 2 DIABETES MELLITUS WITH DIABETIC NEPHROPATHY, WITHOUT LONG-TERM CURRENT USE OF INSULIN (H): ICD-10-CM

## 2022-01-05 LAB — HBA1C MFR BLD: 7.5 % (ref 0–5.6)

## 2022-01-05 PROCEDURE — 83036 HEMOGLOBIN GLYCOSYLATED A1C: CPT

## 2022-01-05 PROCEDURE — 36415 COLL VENOUS BLD VENIPUNCTURE: CPT

## 2022-01-05 NOTE — PROGRESS NOTES
[facility] Care Coordination Contact    Rec'd vm from member stating that he had lab tests done today and he was requested to provide his new [employer] card information.  Call placed to member to share that [facility] is mailing all members new [employer] cards with new ID numbers within the first two weeks of January. Member requests that CC contact the business office at [phone].  Call to [place] billing to report the above information. Per [facility] Customer Service message, that information has been mailed to all providers informing them of the change in ID numbers.   Per billing, when member receives new card, he should share information with billing.  Emerita Pineda RN, BC  Manager [facility] Care Coordinator   [phone]  (Fax)

## 2022-01-12 ENCOUNTER — OFFICE VISIT (OUTPATIENT)
Dept: INTERNAL MEDICINE | Facility: CLINIC | Age: 82
End: 2022-01-12
Payer: COMMERCIAL

## 2022-01-12 VITALS
WEIGHT: 168 LBS | BODY MASS INDEX: 32.98 KG/M2 | OXYGEN SATURATION: 98 % | TEMPERATURE: 97.9 F | HEART RATE: 81 BPM | HEIGHT: 60 IN | DIASTOLIC BLOOD PRESSURE: 74 MMHG | RESPIRATION RATE: 16 BRPM | SYSTOLIC BLOOD PRESSURE: 128 MMHG

## 2022-01-12 DIAGNOSIS — I10 ESSENTIAL HYPERTENSION WITH GOAL BLOOD PRESSURE LESS THAN 140/90: ICD-10-CM

## 2022-01-12 DIAGNOSIS — E78.5 HYPERLIPIDEMIA LDL GOAL <100: ICD-10-CM

## 2022-01-12 DIAGNOSIS — E11.21 TYPE 2 DIABETES MELLITUS WITH DIABETIC NEPHROPATHY, WITHOUT LONG-TERM CURRENT USE OF INSULIN (H): Primary | ICD-10-CM

## 2022-01-12 DIAGNOSIS — R19.7 DIARRHEA, UNSPECIFIED TYPE: ICD-10-CM

## 2022-01-12 PROBLEM — E66.01 MORBID OBESITY DUE TO EXCESS CALORIES (H): Status: RESOLVED | Noted: 2018-09-11 | Resolved: 2022-01-12

## 2022-01-12 PROCEDURE — 82043 UR ALBUMIN QUANTITATIVE: CPT | Performed by: INTERNAL MEDICINE

## 2022-01-12 PROCEDURE — 80053 COMPREHEN METABOLIC PANEL: CPT | Performed by: INTERNAL MEDICINE

## 2022-01-12 PROCEDURE — 36415 COLL VENOUS BLD VENIPUNCTURE: CPT | Performed by: INTERNAL MEDICINE

## 2022-01-12 PROCEDURE — 99207 PR FOOT EXAM NO CHARGE: CPT | Performed by: INTERNAL MEDICINE

## 2022-01-12 PROCEDURE — 99214 OFFICE O/P EST MOD 30 MIN: CPT | Performed by: INTERNAL MEDICINE

## 2022-01-12 PROCEDURE — 80061 LIPID PANEL: CPT | Performed by: INTERNAL MEDICINE

## 2022-01-12 PROCEDURE — 84443 ASSAY THYROID STIM HORMONE: CPT | Performed by: INTERNAL MEDICINE

## 2022-01-12 RX ORDER — HYDROCHLOROTHIAZIDE 12.5 MG/1
CAPSULE ORAL
Qty: 90 CAPSULE | Refills: 3 | Status: SHIPPED | OUTPATIENT
Start: 2022-01-12 | End: 2023-01-04

## 2022-01-12 RX ORDER — LANCETS
EACH MISCELLANEOUS
Qty: 200 EACH | Refills: 1 | Status: SHIPPED | OUTPATIENT
Start: 2022-01-12 | End: 2023-10-05

## 2022-01-12 RX ORDER — LOSARTAN POTASSIUM 100 MG/1
100 TABLET ORAL DAILY
Qty: 90 TABLET | Refills: 3 | Status: SHIPPED | OUTPATIENT
Start: 2022-01-12 | End: 2022-12-13

## 2022-01-12 RX ORDER — ATENOLOL 50 MG/1
50 TABLET ORAL DAILY
Qty: 90 TABLET | Refills: 3 | Status: SHIPPED | OUTPATIENT
Start: 2022-01-12 | End: 2022-12-13

## 2022-01-12 RX ORDER — AMLODIPINE BESYLATE 5 MG/1
5 TABLET ORAL DAILY
Qty: 90 TABLET | Refills: 3 | Status: SHIPPED | OUTPATIENT
Start: 2022-01-12 | End: 2023-01-04

## 2022-01-12 RX ORDER — SIMVASTATIN 20 MG
20 TABLET ORAL AT BEDTIME
Qty: 90 TABLET | Refills: 3 | Status: SHIPPED | OUTPATIENT
Start: 2022-01-12 | End: 2023-01-04

## 2022-01-12 ASSESSMENT — MIFFLIN-ST. JEOR: SCORE: 1311.6

## 2022-01-12 NOTE — PROGRESS NOTES
"  Assessment & Plan   (E11.21) Type 2 diabetes mellitus with diabetic nephropathy, without long-term current use of insulin (H)  (primary encounter diagnosis)  Comment: satisfactorily controlled. Continue current meds.   May try reducing number of metformin tabs taken daily if diarrhea remains a problem.   Plan: losartan (COZAAR) 100 MG tablet, Albumin Random        Urine Quantitative with Creat Ratio, **TSH with        free T4 reflex FUTURE 2mo, Microlet Lancets         MISC, metFORMIN (GLUCOPHAGE) 500 MG tablet,         FOOT EXAM, **A1C FUTURE 6mo          (I10) Essential hypertension with goal blood pressure less than 140/90  Comment: BP at target. Continue current meds.   Plan: losartan (COZAAR) 100 MG tablet, amLODIPine         (NORVASC) 5 MG tablet, atenolol (TENORMIN) 50         MG tablet, hydrochlorothiazide (MICROZIDE) 12.5        MG capsule          (E78.5) Hyperlipidemia LDL goal <100  Comment: Update non-fasting lipids today. Continue current meds.   Plan: Lipid panel reflex to direct LDL Non-fasting,         **Comprehensive metabolic panel FUTURE 2mo,         simvastatin (ZOCOR) 20 MG tablet,         **Comprehensive metabolic panel FUTURE 6mo,         Lipid panel reflex to direct LDL Fasting          (R19.7) Diarrhea, unspecified type  Comment: May try reducing number of metformin tablets taken daily from four to three to see if diarrhea improves. See pt instructions and epic orders.        BMI:   Estimated body mass index is 33.36 kg/m  as calculated from the following:    Height as of this encounter: 1.511 m (4' 11.5\").    Weight as of this encounter: 76.2 kg (168 lb).   Weight management plan: Discussed healthy diet and exercise guidelines    Patient Instructions   Okay to try taking 3 of the metformin tablets each day if diarrhea is a problem.  Go back to 4 tablets a day if glucoses start going high.     Stop by the lab today.     See me in six months, with labs in advance.       Return in about 6 " months (around 7/12/2022) for Diabetes Recheck.    Liam Esquivel MD,   [facility] is a 81 year [facility] who presents for the following health issues : follow up.    HPI     Diabetes Follow-up    How often are you checking your blood sugar? One time daily  What time of day are you checking your blood sugars (select all that apply)?  Before meals  Have you had any blood sugars above 200?  No  Have you had any blood sugars below 70?  No    What symptoms do you notice when your blood sugar is low?  None    What concerns do you have today about your diabetes? None     Do you have any of these symptoms? (Select all that apply)  No numbness or tingling in feet.  No redness, sores or blisters on feet.  No complaints of excessive thirst.  No reports of blurry vision.  No significant changes to weight.      BP Readings from Last 2 Encounters:   01/12/22 128/74   07/14/21 124/60     Hemoglobin A1C POCT (%)   Date Value   07/07/2021 7.2 (H)   04/05/2021 8.4 (H)     Hemoglobin A1C (%)   Date Value   01/05/2022 7.5 (H)     LDL Cholesterol Calculated (mg/dL)   Date Value   01/08/2021 62   02/19/2020 58       Hyperlipidemia Follow-Up      Are you regularly taking any medication or supplement to lower your cholesterol?   Yes- Simvastatin    Are you having muscle aches or other side effects that you think could be caused by your cholesterol lowering medication?  No    Hypertension Follow-up      Do you check your blood pressure regularly outside of the clinic? Yes     Are you following a low salt diet? Yes    Are your blood pressures ever more than 140 on the top number (systolic) OR more   than 90 on the bottom number (diastolic), for example 140/90? No      How many servings of fruits and vegetables do you eat daily?  2-3    On average, how many sweetened beverages do you drink each day (Examples: soda, juice, sweet tea, etc.  Do NOT count diet or artificially sweetened beverages)?    "0    How many days per week do you exercise enough to make your heart beat faster? 3 or less    How many minutes a day do you exercise enough to make your heart beat faster? 20 - 29    How many days per week do you miss taking your medication? 0    The patient's son Sathish was made available by phone and assisted in providing as a Laotian .    The patient reports checking glucoses about once per day, usually before breakfast. He reports that these typically run in the 110-120 range.    His son reports that the patient's higher glucoses tend to be associated with eating larger portions of \"sticky rice\".    The patient does report being bothered by loose stools.  We discussed trying to reduce his metformin from 4 tablets to 3 tablets/day, but trying to go back to 4/day of glucoses run out of control with this.    We reviewed that his blood pressure appears well controlled.  We agreed to update a nonfasting lipid panel today as this was last checked over a year ago.    Past medical, family and social histories as well as medications reviewed and updated as needed.    Review of Systems   No dyspnea or cough. No chest discomfort, dizziness or palpitations. No abdominal pain or rectal bleeding.   No acute problems with vision or speech, lateralizing weakness or paresthesias.    ROS: as above or negative for Respiratory, CV, GI, endocrine, neuro systems.       Objective    /74   Pulse 81   Temp 97.9  F (36.6  C) (Tympanic)   Resp 16   Ht 1.511 m (4' 11.5\")   Wt 76.2 kg (168 lb)   SpO2 98%   BMI 33.36 kg/m    Body mass index is 33.36 kg/m .     Physical Exam   GENERAL: healthy, alert and no distress  RESP: lungs clear to auscultation - no rales, rhonchi or wheezes  CV: regular rate and rhythm, normal S1 S2, no S3 or S4, no murmur, click or rub, no peripheral edema and peripheral pulses strong  MS: no gross musculoskeletal defects noted, no edema  Diabetic foot exam: normal DP and PT pulses, no trophic " changes or ulcerative lesions and normal sensory exam

## 2022-01-12 NOTE — PATIENT INSTRUCTIONS
Okay to try taking 3 of the metformin tablets each day if diarrhea is a problem.  Go back to 4 tablets a day if glucoses start going high.     Stop by the lab today.     See me in six months, with labs in advance.

## 2022-01-13 LAB
ALBUMIN SERPL-MCNC: 3.7 G/DL (ref 3.4–5)
ALP SERPL-CCNC: 43 U/L (ref 40–150)
ALT SERPL W P-5'-P-CCNC: 33 U/L (ref 0–70)
ANION GAP SERPL CALCULATED.3IONS-SCNC: 6 MMOL/L (ref 3–14)
AST SERPL W P-5'-P-CCNC: 19 U/L (ref 0–45)
BILIRUB SERPL-MCNC: 0.4 MG/DL (ref 0.2–1.3)
BUN SERPL-MCNC: 14 MG/DL (ref 7–30)
CALCIUM SERPL-MCNC: 9.2 MG/DL (ref 8.5–10.1)
CHLORIDE BLD-SCNC: 103 MMOL/L (ref 94–109)
CHOLEST SERPL-MCNC: 150 MG/DL
CO2 SERPL-SCNC: 27 MMOL/L (ref 20–32)
CREAT SERPL-MCNC: 1.38 MG/DL (ref 0.66–1.25)
CREAT UR-MCNC: 71 MG/DL
FASTING STATUS PATIENT QL REPORTED: NO
GFR SERPL CREATININE-BSD FRML MDRD: 52 ML/MIN/1.73M2
GLUCOSE BLD-MCNC: 150 MG/DL (ref 70–99)
HDLC SERPL-MCNC: 43 MG/DL
LDLC SERPL CALC-MCNC: 59 MG/DL
MICROALBUMIN UR-MCNC: 171 MG/L
MICROALBUMIN/CREAT UR: 240.85 MG/G CR (ref 0–17)
NONHDLC SERPL-MCNC: 107 MG/DL
POTASSIUM BLD-SCNC: 4.1 MMOL/L (ref 3.4–5.3)
PROT SERPL-MCNC: 8.5 G/DL (ref 6.8–8.8)
SODIUM SERPL-SCNC: 136 MMOL/L (ref 133–144)
TRIGL SERPL-MCNC: 238 MG/DL
TSH SERPL DL<=0.005 MIU/L-ACNC: 1 MU/L (ref 0.4–4)

## 2022-01-24 ENCOUNTER — PATIENT OUTREACH (OUTPATIENT)
Dept: GERIATRIC MEDICINE | Facility: CLINIC | Age: 82
End: 2022-01-24
Payer: COMMERCIAL

## 2022-01-24 NOTE — PROGRESS NOTES
[facility] Care Coordination Contact    Rec'd vm from member on 1/20/22 reporting that he received a bill from [place].  1/20/22 Call placed to member, he shared that he rec'd a bill for $43. Member unable to located date of service on the statement, Acct # [id number]. Explained that CC will f/u with billing in a couple of days and that CC will return follow up call if there are concerns.   1/24/22 [facility], informed that member has no outstanding bills, that a statement was mailed to him that is the responsibility of the health plan. Call placed to member, left vm to share information.   Emerita Pineda RN, BC  Manager [facility] Care Coordinator   [phone]  (Fax)

## 2022-03-14 DIAGNOSIS — K21.9 GASTROESOPHAGEAL REFLUX DISEASE WITHOUT ESOPHAGITIS: ICD-10-CM

## 2022-03-15 RX ORDER — FAMOTIDINE 20 MG/1
TABLET, FILM COATED ORAL
Qty: 90 TABLET | Refills: 3 | OUTPATIENT
Start: 2022-03-15

## 2022-03-15 NOTE — TELEPHONE ENCOUNTER
Refused Prescriptions:                       Disp   Refills    famotidine (PEPCID) 20 MG tablet [Pharmacy*90 tab*3        Sig: TAKE 1 TABLET(20 MG) BY MOUTH DAILY      90 tablets with 3 refills was sent on 7/14/2021. Patient should still have refills.       Kristin SANCHEZ RN   [facility]

## 2022-03-30 ENCOUNTER — PATIENT OUTREACH (OUTPATIENT)
Dept: GERIATRIC MEDICINE | Facility: CLINIC | Age: 82
End: 2022-03-30
Payer: COMMERCIAL

## 2022-03-30 NOTE — PROGRESS NOTES
[facility] Care Coordination Contact    Called member to schedule annual HRA home visit. HRA has been scheduled for 4/15/22 @ 9 AM..   Call placed to [facility] to request , informed that they only have 3 Bin interpreters and unsure if an in person visit can be placed.  [facility] to update CC.  Emerita Pineda RN, BC  Manager [facility] Care Coordinator   [phone]  (Fax)

## 2022-04-14 ENCOUNTER — PATIENT OUTREACH (OUTPATIENT)
Dept: GERIATRIC MEDICINE | Facility: CLINIC | Age: 82
End: 2022-04-14
Payer: COMMERCIAL

## 2022-04-14 NOTE — PROGRESS NOTES
[facility] Care Coordination Contact    Rec'd tele call from [facility]UL[facility] Quijano to report that they are not able to provide an in person .   Call placed to member, attempted to reach him twice, left vm to share above information and that the assessment will be completed remotely by phone.   CC will f/u with member tomorrow morning.  Emerita Pineda RN, BC  Manager [facility] Care Coordinator   [phone]  (Fax)

## 2022-04-15 ENCOUNTER — PATIENT OUTREACH (OUTPATIENT)
Dept: GERIATRIC MEDICINE | Facility: CLINIC | Age: 82
End: 2022-04-15
Payer: COMMERCIAL

## 2022-04-15 SDOH — HEALTH STABILITY: PHYSICAL HEALTH: ON AVERAGE, HOW MANY MINUTES DO YOU ENGAGE IN EXERCISE AT THIS LEVEL?: 30 MIN

## 2022-04-15 SDOH — ECONOMIC STABILITY: INCOME INSECURITY: IN THE LAST 12 MONTHS, WAS THERE A TIME WHEN YOU WERE NOT ABLE TO PAY THE MORTGAGE OR RENT ON TIME?: NO

## 2022-04-15 SDOH — HEALTH STABILITY: PHYSICAL HEALTH: ON AVERAGE, HOW MANY DAYS PER WEEK DO YOU ENGAGE IN MODERATE TO STRENUOUS EXERCISE (LIKE A BRISK WALK)?: 7 DAYS

## 2022-04-15 SDOH — ECONOMIC STABILITY: FOOD INSECURITY: WITHIN THE PAST 12 MONTHS, YOU WORRIED THAT YOUR FOOD WOULD RUN OUT BEFORE YOU GOT MONEY TO BUY MORE.: NEVER TRUE

## 2022-04-15 SDOH — ECONOMIC STABILITY: FOOD INSECURITY: WITHIN THE PAST 12 MONTHS, THE FOOD YOU BOUGHT JUST DIDN'T LAST AND YOU DIDN'T HAVE MONEY TO GET MORE.: NEVER TRUE

## 2022-04-15 ASSESSMENT — ACTIVITIES OF DAILY LIVING (ADL): DEPENDENT_IADLS:: CLEANING;LAUNDRY;SHOPPING;TRANSPORTATION

## 2022-04-15 ASSESSMENT — SOCIAL DETERMINANTS OF HEALTH (SDOH): HOW HARD IS IT FOR YOU TO PAY FOR THE VERY BASICS LIKE FOOD, HOUSING, MEDICAL CARE, AND HEATING?: NOT HARD AT ALL

## 2022-04-15 ASSESSMENT — PATIENT HEALTH QUESTIONNAIRE - PHQ9: SUM OF ALL RESPONSES TO PHQ QUESTIONS 1-9: 1

## 2022-04-15 NOTE — PROGRESS NOTES
"[facility] Care Coordination Contact    [facility] Home Visit Assessment     Health Risk Assessment with Chip Shanks completed on April 15, 2022  Assessment was to be in person, but unable to obtain an in person , the assessment was completed remotely by phone.    Type of residence:: Private home - stairs  Current living arrangement:: I live in a private home with family. Chip stated that he is very happy with his new home, \"lots of space, clean and nice.\"  Michelle moved into a home with stairs, he lives with both of his sons.     Assessment completed with:: Patient, Nicko Language line interpretation (Pavan/Elvia)    Current Care Plan  Member currently receiving the following home care services:   NA  Member currently receiving the following community resources: [facility]  Chip wishes to cancel bus passes, states that he does not use.     Medication Review  Medication reconciliation completed in Epic: Yes  Medication set-up & administration: Independent and sets up on own weekly.  Self-administers medications.  Medication Risk Assessment Medication (1 or more, place referral to MTM): Chip stated that he stopped taking hydrochlorothiazide about a week ago due to side effects, \"heart skipping beats\".  Chip stated that he feels better not taking this medication. Chip states that he checks his b/p daily and there are no changes since he stopped taking hydrochlorothiazide. Chip reports that b/p today 135/78.    Chip reports that in January he decreased his Metformin to 3 pills per day, loose stools have improved, states he takes Pepto Bismol weekly for upset stomach. Chip checks BS daily, reports AM .   MTM Referral Placed: No: Provided education on MTM, offered assistance with scheduling an appt. Chip defers at this time.     Mental/Behavioral Health   Depression Screening:   PHQ-2 Total Score (Adult) - Positive if 3 or more points; Administer PHQ-9 if positive: 0  PHQ-9 Total Score: " 1    Mental health DX:: Yes   Mental health DX how managed:: None    Falls Assessment:   Fallen 2 or more times in the past year?: No   Any fall with injury in the past year?: No    ADL/IADL Dependencies:   Dependent ADLs:: Ambulation-cane  Dependent IADLs:: Cleaning, Laundry, Shopping, Transportation    [facility] sponsored benefits: Shared information re: Silver Sneakers/gym memberships. Chip stated that he did begin to go to the [facility] again, but since he COVID booster vaccine, he feels muscle aches and prefers to wait. Chip reports walking on his treadmill 30 min day/7 days/wk.    PCA Assessment completed at visit: No     Elderly Waiver Eligibility: Yes-will continue on EW    Care Plan & Recommendations:   Will cont [employer], terminate bus passes.  Chip would like to continue goal to keep A1C 7.5 or less.  Chip was informed that POC signature page will be mailed to him. Chip in agreement with POC     See CC for detailed assessment information.    Follow-Up Plan: Member informed of future contact, plan to f/u with member with a 6 month telephone assessment.  Contact information shared with member and family, encouraged member to call with any questions or concerns at any time.    [place] care continuum providers: Please see Snapshot and Care Management Flowsheets for Specific details of care plan.    This CC note routed to PCP.  Emerita Pineda RN, BC  Manager [facility] Care Coordinator   [phone]  (Fax)

## 2022-04-21 ENCOUNTER — PATIENT OUTREACH (OUTPATIENT)
Dept: GERIATRIC MEDICINE | Facility: CLINIC | Age: 82
End: 2022-04-21
Payer: COMMERCIAL

## 2022-04-21 NOTE — TELEPHONE ENCOUNTER
[facility] Care Coordination Contact    Received after visit chart from care coordinator.  Completed following tasks: Mailed copy of care plan to client, Updated services in Database, Submitted referrals/auths for [facility], Mailed copy of POC signature sheet for member to sign and return in SASE  and Mailed [facility] Safe Medication Disposal    and Provider Signature - No POC Shared:  Member indicates that they do not want their POC shared with any EW providers.     Ana Hodges  Care Management Specialist  [facility]  [phone]

## 2022-04-21 NOTE — LETTER
April 21, 2022      [facility]  [address] [facility] [facility] [address]      Dear Diego:    At [facility], we are dedicated to improving your health and well-being. Enclosed is the Comprehensive Care Plan that we developed with you on 04/15/2022. Please review the Care Plan carefully.    As a reminder, some of the things we discussed at your visit include:    Your physical and mental health    Ways to reduce falls    Health care needs you may have    Don t forget to contact your care coordinator if you:    Have been hospitalized or plan to be hospitalized     Have had a fall     Have experienced a change in physical health    Are experiencing emotional problems     If you do not agree with your Care Plan, have questions about it, or have experienced a change in your needs, please call me at [phone]. If you are hearing impaired, please call the [place] Relay at 469 or [phone] (mxmmll-tf-usdbto relay service).    Sincerely,    Emerita Pineda RN    E-mail: [email or web address]  Phone: [phone]      [facility] [place] [facility]\"") is a health plan that contracts with both Medicare and the [place] Medical Assistance (Medicaid) program to provide benefits of both programs to enrollees. Enrollment in [facility] depends on contract renewal.    [id number])     [id number] (11/18)

## 2022-05-03 ENCOUNTER — TRANSFERRED RECORDS (OUTPATIENT)
Dept: HEALTH INFORMATION MANAGEMENT | Facility: CLINIC | Age: 82
End: 2022-05-03
Payer: COMMERCIAL

## 2022-05-03 LAB — RETINOPATHY: POSITIVE

## 2022-06-25 ENCOUNTER — HEALTH MAINTENANCE LETTER (OUTPATIENT)
Age: 82
End: 2022-06-25

## 2022-07-05 ENCOUNTER — LAB (OUTPATIENT)
Dept: LAB | Facility: CLINIC | Age: 82
End: 2022-07-05
Payer: COMMERCIAL

## 2022-07-05 DIAGNOSIS — E11.21 TYPE 2 DIABETES MELLITUS WITH DIABETIC NEPHROPATHY, WITHOUT LONG-TERM CURRENT USE OF INSULIN (H): ICD-10-CM

## 2022-07-05 DIAGNOSIS — E78.5 HYPERLIPIDEMIA LDL GOAL <100: ICD-10-CM

## 2022-07-05 LAB
ALBUMIN SERPL-MCNC: 4 G/DL (ref 3.4–5)
ALP SERPL-CCNC: 45 U/L (ref 40–150)
ALT SERPL W P-5'-P-CCNC: 50 U/L (ref 0–70)
ANION GAP SERPL CALCULATED.3IONS-SCNC: 7 MMOL/L (ref 3–14)
AST SERPL W P-5'-P-CCNC: 48 U/L (ref 0–45)
BILIRUB SERPL-MCNC: 0.6 MG/DL (ref 0.2–1.3)
BUN SERPL-MCNC: 11 MG/DL (ref 7–30)
CALCIUM SERPL-MCNC: 9.1 MG/DL (ref 8.5–10.1)
CHLORIDE BLD-SCNC: 104 MMOL/L (ref 94–109)
CHOLEST SERPL-MCNC: 153 MG/DL
CO2 SERPL-SCNC: 25 MMOL/L (ref 20–32)
CREAT SERPL-MCNC: 1.2 MG/DL (ref 0.66–1.25)
FASTING STATUS PATIENT QL REPORTED: YES
GFR SERPL CREATININE-BSD FRML MDRD: 61 ML/MIN/1.73M2
GLUCOSE BLD-MCNC: 138 MG/DL (ref 70–99)
HBA1C MFR BLD: 7.8 % (ref 0–5.6)
HDLC SERPL-MCNC: 45 MG/DL
LDLC SERPL CALC-MCNC: 69 MG/DL
NONHDLC SERPL-MCNC: 108 MG/DL
POTASSIUM BLD-SCNC: 4.2 MMOL/L (ref 3.4–5.3)
PROT SERPL-MCNC: 8.5 G/DL (ref 6.8–8.8)
SODIUM SERPL-SCNC: 136 MMOL/L (ref 133–144)
TRIGL SERPL-MCNC: 193 MG/DL

## 2022-07-05 PROCEDURE — 83036 HEMOGLOBIN GLYCOSYLATED A1C: CPT

## 2022-07-05 PROCEDURE — 36415 COLL VENOUS BLD VENIPUNCTURE: CPT

## 2022-07-05 PROCEDURE — 80053 COMPREHEN METABOLIC PANEL: CPT

## 2022-07-05 PROCEDURE — 80061 LIPID PANEL: CPT

## 2022-07-11 DIAGNOSIS — K21.9 GASTROESOPHAGEAL REFLUX DISEASE WITHOUT ESOPHAGITIS: ICD-10-CM

## 2022-07-12 ENCOUNTER — OFFICE VISIT (OUTPATIENT)
Dept: INTERNAL MEDICINE | Facility: CLINIC | Age: 82
End: 2022-07-12
Payer: COMMERCIAL

## 2022-07-12 VITALS
TEMPERATURE: 98.1 F | BODY MASS INDEX: 33.36 KG/M2 | RESPIRATION RATE: 16 BRPM | WEIGHT: 168 LBS | HEART RATE: 80 BPM | DIASTOLIC BLOOD PRESSURE: 70 MMHG | SYSTOLIC BLOOD PRESSURE: 130 MMHG | OXYGEN SATURATION: 97 %

## 2022-07-12 DIAGNOSIS — I10 ESSENTIAL HYPERTENSION WITH GOAL BLOOD PRESSURE LESS THAN 140/90: ICD-10-CM

## 2022-07-12 DIAGNOSIS — E78.5 HYPERLIPIDEMIA LDL GOAL <100: ICD-10-CM

## 2022-07-12 DIAGNOSIS — E11.21 TYPE 2 DIABETES MELLITUS WITH DIABETIC NEPHROPATHY, WITHOUT LONG-TERM CURRENT USE OF INSULIN (H): Primary | ICD-10-CM

## 2022-07-12 PROCEDURE — 99214 OFFICE O/P EST MOD 30 MIN: CPT | Performed by: INTERNAL MEDICINE

## 2022-07-12 RX ORDER — FAMOTIDINE 20 MG/1
TABLET, FILM COATED ORAL
Qty: 90 TABLET | Refills: 1 | Status: SHIPPED | OUTPATIENT
Start: 2022-07-12 | End: 2022-12-13

## 2022-07-12 NOTE — PATIENT INSTRUCTIONS
"Everything looks fine!    Refills of medications are on file at your pharmacy.     Lab results look fine.     See me in January 2023 for an office visit, with a fasting \"lab only\" appointment a few days in advance.   "

## 2022-07-12 NOTE — PROGRESS NOTES
"  Assessment & Plan   (E11.21) Type 2 diabetes mellitus with diabetic nephropathy, without long-term current use of insulin (H)  (primary encounter diagnosis)  Comment: A1c at target. Continue current measures.     (E78.5) Hyperlipidemia LDL goal <100  Comment: LDL at target. Continue current meds.     (I10) Essential hypertension with goal blood pressure less than 140/90  Comment: BP at target. Continue current meds.        BMI:   Estimated body mass index is 33.36 kg/m  as calculated from the following:    Height as of 1/12/22: 1.511 m (4' 11.5\").    Weight as of this encounter: 76.2 kg (168 lb).   Weight management plan: Discussed healthy diet and exercise guidelines    Patient Instructions   Everything looks fine!    Refills of medications are on file at your pharmacy.     Lab results look fine.     See me in January 2023 for an office visit, with a fasting \"lab only\" appointment a few days in advance.       No follow-ups on file.    Liam Esquivel MD,   [facility] is a 82 year old, presenting for the following health issues:  No chief complaint on file.      HPI     Diabetes Follow-up    How often are you checking your blood sugar? One time daily  What time of day are you checking your blood sugars (select all that apply)?  before breafast   Have you had any blood sugars above 200?  No  Have you had any blood sugars below 70?  No    What symptoms do you notice when your blood sugar is low?  None and does not happen     What concerns do you have today about your diabetes? None     Do you have any of these symptoms? (Select all that apply)  No numbness or tingling in feet.  No redness, sores or blisters on feet.  No complaints of excessive thirst.  No reports of blurry vision.  No significant changes to weight.        Hyperlipidemia Follow-Up      Are you regularly taking any medication or supplement to lower your cholesterol?   Yes- cozar     Are you having muscle " aches or other side effects that you think could be caused by your cholesterol lowering medication?  No    Hypertension Follow-up      Do you check your blood pressure regularly outside of the clinic? Yes     Are you following a low salt diet? Yes    Are your blood pressures ever more than 140 on the top number (systolic) OR more   than 90 on the bottom number (diastolic), for example 140/90? No    BP Readings from Last 2 Encounters:   01/12/22 128/74   07/14/21 124/60     Hemoglobin A1C POCT (%)   Date Value   07/07/2021 7.2 (H)   04/05/2021 8.4 (H)     Hemoglobin A1C (%)   Date Value   07/05/2022 7.8 (H)   01/05/2022 7.5 (H)     LDL Cholesterol Calculated (mg/dL)   Date Value   07/05/2022 69   01/12/2022 59   01/08/2021 62   02/19/2020 58         How many servings of fruits and vegetables do you eat daily?  2-3    On average, how many sweetened beverages do you drink each day (Examples: soda, juice, sweet tea, etc.  Do NOT count diet or artificially sweetened beverages)?   1    How many days per week do you exercise enough to make your heart beat faster? 7    How many minutes a day do you exercise enough to make your heart beat faster? 30 - 60    How many days per week do you miss taking your medication? 0    The patient reports that AM glucoses are usually below 150.   We reviewed recent labs, showing his A1c and LDL-Cholesterol to be at target.    BP appears satisfactorily controlled.   The patient is tolerating his current medications without any adverse effects.     Past medical, family and social histories as well as medications reviewed and updated as needed.    Review of Systems   No dyspnea or cough. No chest discomfort, dizziness or palpitations. No diarrhea, abdominal pain or rectal bleeding.   No acute problems with vision or speech, lateralizing weakness or paresthesias.    ROS: as above or negative for Respiratory, CV, GI, endocrine, neuro systems.        Objective    Vitals: /70   Pulse 80    Temp 98.1  F (36.7  C) (Oral)   Resp 16   Wt 76.2 kg (168 lb)   SpO2 97%   BMI 33.36 kg/m    BMI= Body mass index is 33.36 kg/m .     Physical Exam   GENERAL: healthy, alert and no distress  RESP: lungs clear to auscultation - no rales, rhonchi or wheezes  CV: regular rate and rhythm, normal S1 S2, no S3 or S4, no murmur, click or rub, no peripheral edema and peripheral pulses strong  MS: no gross musculoskeletal defects noted, no edema  NEURO: Normal strength and tone, mentation intact and speech normal  PSYCH: mentation appears normal, affect normal/bright

## 2022-07-12 NOTE — TELEPHONE ENCOUNTER
Prescription approved per [facility], [facility] or [facility] refill protocol.  Mayela ARECHIGA - Registered Nurse  [facility]  Acute and Diagnostic Services

## 2022-07-13 ENCOUNTER — PATIENT OUTREACH (OUTPATIENT)
Dept: GERIATRIC MEDICINE | Facility: CLINIC | Age: 82
End: 2022-07-13

## 2022-07-13 NOTE — PROGRESS NOTES
[facility] Care Coordination Contact    Rec'd vm from member requesting a return call.   Call placed to member, he requested assistance with scheduling his f/u with PCP in January.   Conference call placed to PCC, member scheduled fasting lab 12/28/22 @ 8 AM and f/u with PCP 1/4/23 @ 1  Member stated understanding.  Emerita Pineda RN, BC  Manager [facility] Care Coordinator   [phone]  (Fax)

## 2022-08-17 ENCOUNTER — PATIENT OUTREACH (OUTPATIENT)
Dept: GERIATRIC MEDICINE | Facility: CLINIC | Age: 82
End: 2022-08-17

## 2022-08-17 NOTE — PROGRESS NOTES
CC updated program tasks and targets for [employer] launch.  Emerita Pineda RN, BC  Manager [facility] Coordinator   [phone]  (Fax)

## 2022-10-25 ENCOUNTER — PATIENT OUTREACH (OUTPATIENT)
Dept: GERIATRIC MEDICINE | Facility: CLINIC | Age: 82
End: 2022-10-25

## 2022-10-25 NOTE — PROGRESS NOTES
[facility] Care Coordination Contact      [facility] Six-Month Telephone Assessment    6 month telephone assessment completed on 10/25/22.    ER visits: No  Hospitalizations: No  TCU stays: No  Significant health status changes: no   Falls/Injuries: No  ADL/IADL changes: No  Changes in services: No    Caregiver Assessment follow up:  na    Goals: See POC in chart for goal progress documentation.      Will see member in 6 months for an annual health risk assessment.   Encouraged member to call CC with any questions or concerns in the meantime.     Dannielle Frye RN  [facility]  Office:[phone]  Cell Phone: [phone]

## 2022-11-20 ENCOUNTER — HEALTH MAINTENANCE LETTER (OUTPATIENT)
Age: 82
End: 2022-11-20

## 2022-12-08 ENCOUNTER — DOCUMENTATION ONLY (OUTPATIENT)
Dept: LAB | Facility: CLINIC | Age: 82
End: 2022-12-08

## 2022-12-08 DIAGNOSIS — E11.21 TYPE 2 DIABETES MELLITUS WITH DIABETIC NEPHROPATHY, WITHOUT LONG-TERM CURRENT USE OF INSULIN (H): Primary | ICD-10-CM

## 2022-12-08 DIAGNOSIS — E78.5 HYPERLIPIDEMIA LDL GOAL <100: ICD-10-CM

## 2022-12-28 ENCOUNTER — LAB (OUTPATIENT)
Dept: LAB | Facility: CLINIC | Age: 82
End: 2022-12-28
Payer: COMMERCIAL

## 2022-12-28 DIAGNOSIS — E11.21 TYPE 2 DIABETES MELLITUS WITH DIABETIC NEPHROPATHY (H): Primary | ICD-10-CM

## 2022-12-28 DIAGNOSIS — E11.21 TYPE 2 DIABETES MELLITUS WITH DIABETIC NEPHROPATHY, WITHOUT LONG-TERM CURRENT USE OF INSULIN (H): ICD-10-CM

## 2022-12-28 DIAGNOSIS — E78.5 HYPERLIPIDEMIA LDL GOAL <100: ICD-10-CM

## 2022-12-28 LAB
ALBUMIN SERPL BCG-MCNC: 4.4 G/DL (ref 3.5–5.2)
ALP SERPL-CCNC: 49 U/L (ref 40–129)
ALT SERPL W P-5'-P-CCNC: 26 U/L (ref 10–50)
ANION GAP SERPL CALCULATED.3IONS-SCNC: 15 MMOL/L (ref 7–15)
AST SERPL W P-5'-P-CCNC: 33 U/L (ref 10–50)
BILIRUB SERPL-MCNC: 0.6 MG/DL
BUN SERPL-MCNC: 13.2 MG/DL (ref 8–23)
CALCIUM SERPL-MCNC: 9.9 MG/DL (ref 8.8–10.2)
CHLORIDE SERPL-SCNC: 99 MMOL/L (ref 98–107)
CHOLEST SERPL-MCNC: 126 MG/DL
CREAT SERPL-MCNC: 1.36 MG/DL (ref 0.67–1.17)
CREAT UR-MCNC: 65.9 MG/DL
DEPRECATED HCO3 PLAS-SCNC: 26 MMOL/L (ref 22–29)
GFR SERPL CREATININE-BSD FRML MDRD: 52 ML/MIN/1.73M2
GLUCOSE SERPL-MCNC: 145 MG/DL (ref 70–99)
HBA1C MFR BLD: 7.9 % (ref 0–5.6)
HDLC SERPL-MCNC: 43 MG/DL
LDLC SERPL CALC-MCNC: 56 MG/DL
MICROALBUMIN UR-MCNC: 287 MG/L
MICROALBUMIN/CREAT UR: 435.51 MG/G CR (ref 0–17)
NONHDLC SERPL-MCNC: 83 MG/DL
POTASSIUM SERPL-SCNC: 3.8 MMOL/L (ref 3.4–5.3)
PROT SERPL-MCNC: 8.6 G/DL (ref 6.4–8.3)
SODIUM SERPL-SCNC: 140 MMOL/L (ref 136–145)
TRIGL SERPL-MCNC: 136 MG/DL
TSH SERPL DL<=0.005 MIU/L-ACNC: 1.21 UIU/ML (ref 0.3–4.2)

## 2022-12-28 PROCEDURE — 80053 COMPREHEN METABOLIC PANEL: CPT | Performed by: INTERNAL MEDICINE

## 2022-12-28 PROCEDURE — 82043 UR ALBUMIN QUANTITATIVE: CPT | Performed by: INTERNAL MEDICINE

## 2022-12-28 PROCEDURE — 83036 HEMOGLOBIN GLYCOSYLATED A1C: CPT | Performed by: INTERNAL MEDICINE

## 2022-12-28 PROCEDURE — 84443 ASSAY THYROID STIM HORMONE: CPT | Performed by: INTERNAL MEDICINE

## 2022-12-28 PROCEDURE — 82570 ASSAY OF URINE CREATININE: CPT | Performed by: INTERNAL MEDICINE

## 2022-12-28 PROCEDURE — 80061 LIPID PANEL: CPT | Performed by: INTERNAL MEDICINE

## 2022-12-28 PROCEDURE — 36415 COLL VENOUS BLD VENIPUNCTURE: CPT | Performed by: INTERNAL MEDICINE

## 2023-01-04 ENCOUNTER — VIRTUAL VISIT (OUTPATIENT)
Dept: INTERNAL MEDICINE | Facility: CLINIC | Age: 83
End: 2023-01-04
Payer: COMMERCIAL

## 2023-01-04 DIAGNOSIS — K21.9 GASTROESOPHAGEAL REFLUX DISEASE WITHOUT ESOPHAGITIS: ICD-10-CM

## 2023-01-04 DIAGNOSIS — I10 ESSENTIAL HYPERTENSION WITH GOAL BLOOD PRESSURE LESS THAN 140/90: ICD-10-CM

## 2023-01-04 DIAGNOSIS — E11.21 TYPE 2 DIABETES MELLITUS WITH DIABETIC NEPHROPATHY, WITHOUT LONG-TERM CURRENT USE OF INSULIN (H): ICD-10-CM

## 2023-01-04 DIAGNOSIS — E78.5 HYPERLIPIDEMIA LDL GOAL <100: ICD-10-CM

## 2023-01-04 PROCEDURE — 99214 OFFICE O/P EST MOD 30 MIN: CPT | Mod: 95 | Performed by: INTERNAL MEDICINE

## 2023-01-04 RX ORDER — FAMOTIDINE 20 MG/1
20 TABLET, FILM COATED ORAL DAILY
Qty: 90 TABLET | Refills: 3 | Status: SHIPPED | OUTPATIENT
Start: 2023-01-04 | End: 2024-02-01

## 2023-01-04 RX ORDER — HYDROCHLOROTHIAZIDE 12.5 MG/1
CAPSULE ORAL
Qty: 90 CAPSULE | Refills: 3 | Status: SHIPPED | OUTPATIENT
Start: 2023-01-04 | End: 2023-10-05

## 2023-01-04 RX ORDER — ATENOLOL 50 MG/1
50 TABLET ORAL DAILY
Qty: 90 TABLET | Refills: 3 | Status: SHIPPED | OUTPATIENT
Start: 2023-01-04 | End: 2023-10-05

## 2023-01-04 RX ORDER — AMLODIPINE BESYLATE 5 MG/1
5 TABLET ORAL DAILY
Qty: 90 TABLET | Refills: 3 | Status: SHIPPED | OUTPATIENT
Start: 2023-01-04 | End: 2023-10-05

## 2023-01-04 RX ORDER — SIMVASTATIN 20 MG
20 TABLET ORAL AT BEDTIME
Qty: 90 TABLET | Refills: 3 | Status: SHIPPED | OUTPATIENT
Start: 2023-01-04 | End: 2023-03-15

## 2023-01-04 RX ORDER — LOSARTAN POTASSIUM 100 MG/1
100 TABLET ORAL DAILY
Qty: 90 TABLET | Refills: 3 | Status: SHIPPED | OUTPATIENT
Start: 2023-01-04 | End: 2023-10-05

## 2023-01-04 NOTE — PROGRESS NOTES
"Chip is a 82 year old who is being evaluated via a billable video visit.      How would you like to obtain your AVS? MyChart  If the video visit is dropped, the invitation should be resent by: Text to cell phone: [phone]  Will anyone else be joining your video visit? Yes: son, will do interpreting. How would they like to receive their invitation? Text to cell phone: [phone]      Assessment & Plan   (E11.21) Type 2 diabetes mellitus with diabetic nephropathy, without long-term current use of insulin (H)  Comment: Gradually declining control on metformin alone. Advised GLP-1 agonist which he wants to defer. Advised diabetes education, then f/u in 3 mos.   Plan: metFORMIN (GLUCOPHAGE) 500 MG tablet, losartan         (COZAAR) 100 MG tablet, AMB Adult Diabetes         Educator Referral          (I10) Essential hypertension with goal blood pressure less than 140/90  Comment: BP at target. Continue current meds.   Plan: amLODIPine (NORVASC) 5 MG tablet, atenolol         (TENORMIN) 50 MG tablet, hydrochlorothiazide         (MICROZIDE) 12.5 MG capsule, losartan (COZAAR)         100 MG tablet          (E78.5) Hyperlipidemia LDL goal <100  Comment: LDL at target. Continue current meds.   Plan: simvastatin (ZOCOR) 20 MG tablet          (K21.9) Gastroesophageal reflux disease without esophagitis  Comment: Stable. Continue current meds.   Plan: famotidine (PEPCID) 20 MG tablet               BMI:   Estimated body mass index is 33.36 kg/m  as calculated from the following:    Height as of 1/12/22: 1.511 m (4' 11.5\").    Weight as of 7/12/22: 76.2 kg (168 lb).   Weight management plan: Discussed healthy diet and exercise guidelines    Patient Instructions   Please see the Diabetes educator to review ways to improve your A1c with changes in eating and exercise habits.     Eventually we may need to look at adding a medication, like a once a week injectable medication that can reduce your weight and appetite and improve " diabetes control.     See me back in about three months, with labs a few days in advance.       No follow-ups on file.    Liam Esquivel MD,   [facility]    Sarabjit Saunders is a 82 year old accompanied by his son, presenting for the following health issues:  Follow Up and Diabetes      History of Present Illness       Diabetes:   He presents for follow up of diabetes.  He is checking home blood glucose one time daily. He checks blood glucose before meals.  Blood glucose is never over 200 and never under 70. When his blood glucose is low, the patient is asymptomatic for confusion, blurred vision, lethargy and reports not feeling dizzy, shaky, or weak.  He has no concerns regarding his diabetes at this time.  He is not experiencing numbness or burning in feet, excessive thirst, blurry vision, weight changes or redness, sores or blisters on feet.         He eats 2-3 servings of fruits and vegetables daily.He consumes 0 sweetened beverage(s) daily.He exercises with enough effort to increase his heart rate 20 to 29 minutes per day.  He exercises with enough effort to increase his heart rate 7 days per week.   He is taking medications regularly.     Reviewed with patient and son Mendez (CHARMAINE): [phone]) most results and upward trending of A1c over recent months.   We discussed that he has maximized his dose of metformin, and has previously reported an intolerance of glimepiride.   We discussed that he may need to look at adding a medication, such as a once weekly injectable GLP-1 agonist medication.     He wants to postpone this decision, and we discussed that he might try to meet with a diabetes educator to maximize non-medication interventions, then revisit this issue with me in three months.     The patient is tolerating his current medications without any adverse effects.  Refilled needed meds.     LDL-Cholesterol controlled on simvastatin.   BP historically well controlled.     No problems  with heartburn on famotidine. No dysphagia or odynophagia.    Past medical, family and social histories as well as medications reviewed and updated as needed.    Review of Systems   No dyspnea or cough. No chest discomfort, dizziness or palpitations. No diarrhea, abdominal pain or rectal bleeding.   No acute problems with vision or speech, lateralizing weakness or paresthesias.    ROS: as above or negative for Respiratory, CV, GI, endocrine, neuro systems.       Objective    Vitals - Patient Reported  Weight (Patient Reported): 76.7 kg (169 lb)        Physical Exam   GENERAL: Healthy, alert and no distress  EYES: Eyes grossly normal to inspection.  No discharge or erythema, or obvious scleral/conjunctival abnormalities.  RESP: No audible wheeze, cough, or visible cyanosis.  No visible retractions or increased work of breathing.    SKIN: Visible skin clear. No significant rash, abnormal pigmentation or lesions.  NEURO: Cranial nerves grossly intact.  Mentation and speech appropriate for age.  PSYCH: Mentation appears normal, affect normal/bright, judgement and insight intact, normal speech and appearance well-groomed.        Video-Visit Details    Type of service:  Video Visit   Video Start Time: 1357  Video End Time: 1403    Originating Location (pt. Location): Home  Distant Location (provider location):  Off-site  Platform used for Video Visit: Easy Tempo

## 2023-01-05 NOTE — PATIENT INSTRUCTIONS
Please see the Diabetes educator to review ways to improve your A1c with changes in eating and exercise habits.     Eventually we may need to look at adding a medication, like a once a week injectable medication that can reduce your weight and appetite and improve diabetes control.     See me back in about three months, with labs a few days in advance.

## 2023-01-06 ENCOUNTER — TELEPHONE (OUTPATIENT)
Dept: INTERNAL MEDICINE | Facility: CLINIC | Age: 83
End: 2023-01-06

## 2023-01-06 NOTE — TELEPHONE ENCOUNTER
Diabetes Education Scheduling Outreach #1:    Call to patient to schedule. Left message with phone number to call to schedule.    Also sent roomlinx message for second attempt. Requested patient to call to schedule.    [facility]  Diabetes and Nutrition Scheduling

## 2023-01-09 ENCOUNTER — PATIENT OUTREACH (OUTPATIENT)
Dept: GERIATRIC MEDICINE | Facility: CLINIC | Age: 83
End: 2023-01-09

## 2023-01-09 NOTE — PROGRESS NOTES
[facility] Care Coordination Contact    Rec'd vm from member 1/5/23 requesting a return call. CC out of office until 1/9/23.    Call placed to member with , scheduled 3 month appt with PCP and lab appt prior to appt. Explained to member that PCP recommended DM education, scheduled initial appt with educator 2/1/23 @ 10:30 AM. Member to keep 3 day journal of all that eats/drinks and bring his machine with to the appt.    CC inquired if member had any questions regarding above appt's, per member he did not.  Emerita Pineda RN, BC  Manager [facility] Care Coordinator   [phone]  (Fax)

## 2023-01-21 DIAGNOSIS — E11.21 TYPE 2 DIABETES MELLITUS WITH DIABETIC NEPHROPATHY, WITHOUT LONG-TERM CURRENT USE OF INSULIN (H): ICD-10-CM

## 2023-01-25 RX ORDER — LANCETS 30 GAUGE
EACH MISCELLANEOUS
Qty: 100 EACH | Refills: 0 | Status: SHIPPED | OUTPATIENT
Start: 2023-01-25 | End: 2023-08-08

## 2023-02-01 ENCOUNTER — ALLIED HEALTH/NURSE VISIT (OUTPATIENT)
Dept: EDUCATION SERVICES | Facility: CLINIC | Age: 83
End: 2023-02-01
Attending: INTERNAL MEDICINE
Payer: COMMERCIAL

## 2023-02-01 DIAGNOSIS — E11.21 TYPE 2 DIABETES MELLITUS WITH DIABETIC NEPHROPATHY, WITHOUT LONG-TERM CURRENT USE OF INSULIN (H): ICD-10-CM

## 2023-02-01 PROCEDURE — G0108 DIAB MANAGE TRN  PER INDIV: HCPCS | Mod: AE | Performed by: DIETITIAN, REGISTERED

## 2023-02-01 NOTE — LETTER
2/1/2023         RE: Chip Shanks  [address] [facility] [address]        Dear Colleague,    Thank you for referring your patient, Chip Shanks, to the [facility]. Please see a copy of my visit note below.    Diabetes Self-Management Education & Support    Presents for: Individual review    Type of Service: In Person Visit    Assessment Type:   ASSESSMENT:  Patient presents for Diabetes Education refresher with assistance of phone .  Was last seen ~5 years ago for education.  Patient with no specific questions or concerns.  Feels his diabetes control is pretty good.  Is taking 2000mg Metformin daily without side effects.  Is testing glucose level once daily, fasting.  Values ranged from 100-150 with 75% of readings <130.  Patient states he watches portion sizes.  Does not recall which foods are carbs.  Patient is active daily, for 30+ minutes ([employer] or outdoor walks).      Patient's most recent   Lab Results   Component Value Date    A1C 7.9 12/28/2022    A1C 7.2 07/07/2021     is not meeting goal of <7.0    Diabetes knowledge and skills assessment:   Patient is knowledgeable in diabetes management concepts related to: Being Active, Monitoring, Taking Medication and Reducing Risks    Continue education with the following diabetes management concepts: Healthy Eating, Monitoring and Problem Solving    Based on learning assessment above, most appropriate setting for further diabetes education would be: Individual setting.      PLAN    Continue 2000mg Metformin daily  Discussed reducing portion size of rice at lunch and dinner.  Consider omitting fruit juice or diluting.  Continue exercise of 30+ minutes daily.  Begin post-meal testing ~2x/week.      Topics to cover at upcoming visits: Problem Solving    Follow-up: per patient, at least annually    See Care Plan for co-developed, patient-state behavior change goals.  AVS provided for patient today.    Education  "Materials Provided:  My Plate Planner      SUBJECTIVE/OBJECTIVE:  Presents for: Individual review  Accompanied by: Self,   Diabetes education in the past 24mo: No  Diabetes type: Type 2  Disease course: Getting harder to manage  How confident are you filling out medical forms by yourself:: Not Assessed  Other concerns:: Language barrier  Cultural Influences/Ethnic Background:  Choose not to answer      Diabetes Symptoms & Complications:     Complications assessed today?: No    Patient Problem List and Family Medical History reviewed for relevant medical history, current medical status, and diabetes risk factors.    Vitals:  There were no vitals taken for this visit.  Estimated body mass index is 33.36 kg/m  as calculated from the following:    Height as of 1/12/22: 1.511 m (4' 11.5\").    Weight as of 7/12/22: 76.2 kg (168 lb).   Last 3 BP:   BP Readings from Last 3 Encounters:   07/12/22 130/70   01/12/22 128/74   07/14/21 124/60       History   Smoking Status     Never   Smokeless Tobacco     Never       Labs:  Lab Results   Component Value Date    A1C 7.9 12/28/2022    A1C 7.2 07/07/2021     Lab Results   Component Value Date     12/28/2022     07/05/2022     01/08/2021     Lab Results   Component Value Date    LDL 56 12/28/2022    LDL 62 01/08/2021     HDL Cholesterol   Date Value Ref Range Status   01/08/2021 45 >39 mg/dL Final     Direct Measure HDL   Date Value Ref Range Status   12/28/2022 43 >=40 mg/dL Final   ]  GFR Estimate   Date Value Ref Range Status   12/28/2022 52 (L) >60 mL/min/1.73m2 Final     Comment:     Effective December 21, 2021 eGFRcr in adults is calculated using the 2021 CKD-EPI creatinine equation which includes age and gender (Param holt al., NEJM, DOI: 10.1056/CVEUrs8132151)   01/08/2021 68 >60 mL/min/[1.73_m2] Final     Comment:     Non  GFR Calc  Starting 12/18/2018, serum creatinine based estimated GFR (eGFR) will be   calculated using the " Chronic Kidney Disease Epidemiology Collaboration   (CKD-EPI) equation.       GFR Estimate If Black   Date Value Ref Range Status   01/08/2021 79 >60 mL/min/[1.73_m2] Final     Comment:      GFR Calc  Starting 12/18/2018, serum creatinine based estimated GFR (eGFR) will be   calculated using the Chronic Kidney Disease Epidemiology Collaboration   (CKD-EPI) equation.       Lab Results   Component Value Date    CR 1.36 12/28/2022    CR 1.03 01/08/2021     No results found for: MICROALBUMIN    Healthy Eating:  Healthy Eating Assessed Today: Yes  Meal planning/habits: Low salt, Smaller portions  Meals include: Breakfast, Lunch, Dinner, Afternoon Snack  Breakfast: 2-3 pancakes, coffee/tea w/ 1 tsp sugar  Lunch: chicken, 1 cup rice, cucumber salad, water or 4 oz fruit juice  Dinner: chicken/beef, 1 cup sticky rice, water  Snacks: PM - fruit (banana or orange)  Beverages: Coffee, Water, Juice  Has patient met with a dietitian in the past?: Yes    Being Active:  Being Active Assessed Today: Yes  Exercise:: Yes  Days per week of moderate to strenuous exercise (like a brisk walk): 7  On average, minutes per day of exercise at this level: 40  How intense was your typical exercise? : Moderate (like brisk walking)  Exercise Minutes per Week: 280  Barrier to exercise: None    Monitoring:  Monitoring Assessed Today: Yes  Did patient bring glucose meter to appointment? : Yes  Times checking blood sugar at home (number): 1  Times checking blood sugar at home (per): Day  Blood glucose trend:  (100-149)        Taking Medications:  Diabetes Medication(s)     Biguanides       metFORMIN (GLUCOPHAGE) 500 MG tablet    TAKE 2 TABLET BY MOUTH EVERY MORNING AND 1 TO 2 TABLET BY MOUTH WITH SUPPER AS DIRECTED          Taking Medication Assessed Today: Yes  Current Treatments: Oral Medication (taken by mouth)  Problems taking diabetes medications regularly?: No  Diabetes medication side effects?: No    Problem Solving:                  Reducing Risks:  Reducing Risks Assessed Today: Yes  Has dilated eye exam at least once a year?: Yes  Sees dentist every 6 months?: No  Feet checked by healthcare provider in the last year?: Yes    Healthy Coping:  Healthy Coping Assessed Today: Yes  Emotional response to diabetes: Ready to learn  Informal Support system:: Family  Stage of change: ACTION (Actively working towards change)  Support resources: None  Patient Activation Measure Survey Score:  JERMAIN Score (Last Two) 4/18/2012   JERMAIN Raw Score 38   Activation Score 52.9   JERMAIN Level 2         Care Plan and Education Provided:  Care Plan: Diabetes   Updates made by Gemma Mayo RD since 2/1/2023 12:00 AM      Problem: HbA1C Not In Goal       Goal: Establish Regular Follow-Ups with PCP       Task: Discuss with PCP the recommended timing for patient's next follow up visit(s)    Responsible User: Gemma Mayo RD      Task: Discuss schedule for PCP visits with patient    Responsible User: Gemma Mayo RD      Goal: Get HbA1C Level in Goal       Task: Educate patient on diabetes education self-management topics    Responsible User: Gemma Mayo RD      Task: Educate patient on benefits of regular glucose monitoring    Responsible User: Gemma Mayo RD      Task: Refer patient to appropriate extended care team member, as needed (Medication Therapy Management, Behavioral Health, Physical Therapy, etc.)    Responsible User: Gemma Mayo RD      Task: Discuss diabetes treatment plan with patient    Responsible User: Gemma Mayo RD      Problem: Diabetes Self-Management Education Needed to Optimize Self-Care Behaviors       Goal: Understand diabetes pathophysiology and disease progression       Task: Provide education on diabetes pathophysiology and disease progression specfic to patient's diabetes type    Responsible User: Gemma Mayo RD      Goal: Healthy Eating - follow a healthy eating pattern for diabetes        Task: Provide education on portion control and consistency in amount, composition and timing of food intake Completed 2/1/2023   Responsible User: Gemma Mayo RD      Task: Provide education on managing carbohydrate intake (carbohydrate counting, plate planning method, etc.) Completed 2/1/2023   Responsible User: Gemma Mayo RD      Task: Provide education on weight management    Responsible User: Gemma Mayo RD      Task: Provide education on heart healthy eating    Responsible User: Gemma Mayo RD      Task: Provide education on eating out    Responsible User: Gemma Mayo RD      Task: Develop individualized healthy eating plan with patient    Responsible User: Gemma Mayo RD      Goal: Being Active - get regular physical activity, working up to at least 150 minutes per week       Task: Provide education on relationship of activity to glucose and precautions to take if at risk for low glucose Completed 2/1/2023   Responsible User: Gemma Mayo RD      Task: Discuss barriers to physical activity with patient    Responsible User: Gemma Mayo RD      Task: Develop physical activity plan with patient    Responsible User: Gemma Mayo RD      Task: Explore community resources including walking groups, assistance programs, and home videos    Responsible User: Gemma Mayo RD      Goal: Monitoring - monitor glucose and ketones as directed    This Visit's Progress: 50%   Note:    I will test my glucose level 2 hours post-meal ~2 times per week.     Task: Provide education on blood glucose monitoring (purpose, proper technique, frequency, glucose targets, interpreting results, when to use glucose control solution, sharps disposal) Completed 2/1/2023   Responsible User: Gemma Mayo RD      Task: Provide education on continuous glucose monitoring (sensor placement, use of karen or /reader, understanding glucose trends, alerts and alarms,  differences between sensor glucose and blood glucose)    Responsible User: Gemma Mayo RD      Task: Provide education on ketone monitoring (when to monitor, frequency, etc.)    Responsible User: Gemma Mayo RD      Goal: Taking Medication - patient is consistently taking medications as directed       Task: Provide education on action of prescribed medication, including when to take and possible side effects Completed 2/1/2023   Responsible User: Gemma Mayo RD      Task: Provide education on insulin and injectable diabetes medications, including administration, storage, site selection and rotation for injection sites    Responsible User: Gemma Mayo RD      Task: Discuss barriers to medication adherence with patient and provide management technique ideas as appropriate    Responsible User: Gemma Mayo RD      Task: Provide education on frequency and refill details of medications    Responsible User: Gemma Mayo RD      Goal: Problem Solving - know how to prevent and manage short-term diabetes complications       Task: Provide education on high blood glucose - causes, signs/symptoms, prevention and treatment    Responsible User: Gemma Mayo RD      Task: Provide education on low blood glucose - causes, signs/symptoms, prevention, treatment, carrying a carbohydrate source at all times, and medical identification    Responsible User: Gemma Mayo RD      Task: Provide education on safe travel with diabetes    Responsible User: Gemma Mayo RD      Task: Provide education on how to care for diabetes on sick days    Responsible User: Gemma Mayo RD      Task: Provide education on when to call a health care provider    Responsible User: Gemma Mayo RD      Goal: Reducing Risks - know how to prevent and treat long-term diabetes complications       Task: Provide education on major complications of diabetes, prevention, early diagnostic measures and  treatment of complications    Responsible User: Gemma Mayo RD      Task: Provide education on recommended care for dental, eye and foot health Completed 2/1/2023   Responsible User: Gemma Mayo RD      Task: Provide education on Hemoglobin A1c - goals and relationship to blood glucose levels    Responsible User: Gemma Mayo RD      Task: Provide education on recommendations for heart health - lipid levels and goals, blood pressure and goals, and aspirin therapy, if indicated    Responsible User: Gemma Mayo RD      Task: Provide education on tobacco cessation    Responsible User: Gemma Mayo RD      Goal: Healthy Coping - use available resources to cope with the challenges of managing diabetes       Task: Discuss recognizing feelings about having diabetes    Responsible User: Gemma Mayo RD      Task: Provide education on the benefits of making appropriate lifestyle changes Completed 2/1/2023   Responsible User: Gemma Mayo RD      Task: Provide education on benefits of utilizing support systems    Responsible User: Gemma Mayo RD      Task: Discuss methods for coping with stress    Responsible User: Gemma Mayo RD      Task: Provide education on when to seek professional counseling    Responsible User: Gemma Mayo RD Tami Carpenter, RDN LD [facility]ES      Time Spent: 30 minutes  Encounter Type: Individual    Any diabetes medication dose changes were made via the CDE Protocol per the patient's referring provider. A copy of this encounter was shared with the provider.

## 2023-02-01 NOTE — PATIENT INSTRUCTIONS
MY DIABETES TODAY:    1)  Goal A1C is under <7.0  Mine is:      Lab Results   Component Value Date    A1C 7.9 12/28/2022    A1C 7.2 07/07/2021       2)  Goal LDL (bad cholesterol) under 100  (measured at least yearly)- I am currently at:   Lab Results   Component Value Date    LDL 56 12/28/2022    LDL 62 01/08/2021       3)  Goal blood pressure under 130/80- mine was Data Unavailable today    Care Plan:  Continue 2000mg Metformin daily  Discussed reducing portion size of rice at lunch and dinner.  Consider omitting fruit juice or diluting.  Continue exercise of 30+ minutes daily.  Begin post-meal testing ~2x/week.    Follow up:  Call ([phone]), e-mail ([email or web address]), or send MyChart message with questions, concerns or if follow-up is needed.  Follow-up for annual diabetes education review in 1 year or sooner, if needed.     Bring blood glucose meter and logbook with you to all doctor and follow-up appointments.     [facility] the [facility]:  For Your Diabetes or Nutrition Education Appointments Call:  [phone]   For Diabetes or Nutrition Related Questions:   [phone]  Send MyChart Message   If you need a medication refill please contact your pharmacy. Please allow 3 business days for your refills to be completed.

## 2023-02-01 NOTE — PROGRESS NOTES
Diabetes Self-Management Education & Support    Presents for: Individual review    Type of Service: In Person Visit    Assessment Type:   ASSESSMENT:  Patient presents for Diabetes Education refresher with assistance of phone .  Was last seen ~5 years ago for education.  Patient with no specific questions or concerns.  Feels his diabetes control is pretty good.  Is taking 2000mg Metformin daily without side effects.  Is testing glucose level once daily, fasting.  Values ranged from 100-150 with 75% of readings <130.  Patient states he watches portion sizes.  Does not recall which foods are carbs.  Patient is active daily, for 30+ minutes ([employer] or outdoor walks).      Patient's most recent   Lab Results   Component Value Date    A1C 7.9 12/28/2022    A1C 7.2 07/07/2021     is not meeting goal of <7.0    Diabetes knowledge and skills assessment:   Patient is knowledgeable in diabetes management concepts related to: Being Active, Monitoring, Taking Medication and Reducing Risks    Continue education with the following diabetes management concepts: Healthy Eating, Monitoring and Problem Solving    Based on learning assessment above, most appropriate setting for further diabetes education would be: Individual setting.      PLAN    Continue 2000mg Metformin daily  Discussed reducing portion size of rice at lunch and dinner.  Consider omitting fruit juice or diluting.  Continue exercise of 30+ minutes daily.  Begin post-meal testing ~2x/week.      Topics to cover at upcoming visits: Problem Solving    Follow-up: per patient, at least annually    See Care Plan for co-developed, patient-state behavior change goals.  AVS provided for patient today.    Education Materials Provided:  My Plate Planner      SUBJECTIVE/OBJECTIVE:  Presents for: Individual review  Accompanied by: Self,   Diabetes education in the past 24mo: No  Diabetes type: Type 2  Disease course: Getting harder to manage  How confident are  "you filling out medical forms by yourself:: Not Assessed  Other concerns:: Language barrier  Cultural Influences/Ethnic Background:  Choose not to answer      Diabetes Symptoms & Complications:     Complications assessed today?: No    Patient Problem List and Family Medical History reviewed for relevant medical history, current medical status, and diabetes risk factors.    Vitals:  There were no vitals taken for this visit.  Estimated body mass index is 33.36 kg/m  as calculated from the following:    Height as of 1/12/22: 1.511 m (4' 11.5\").    Weight as of 7/12/22: 76.2 kg (168 lb).   Last 3 BP:   BP Readings from Last 3 Encounters:   07/12/22 130/70   01/12/22 128/74   07/14/21 124/60       History   Smoking Status     Never   Smokeless Tobacco     Never       Labs:  Lab Results   Component Value Date    A1C 7.9 12/28/2022    A1C 7.2 07/07/2021     Lab Results   Component Value Date     12/28/2022     07/05/2022     01/08/2021     Lab Results   Component Value Date    LDL 56 12/28/2022    LDL 62 01/08/2021     HDL Cholesterol   Date Value Ref Range Status   01/08/2021 45 >39 mg/dL Final     Direct Measure HDL   Date Value Ref Range Status   12/28/2022 43 >=40 mg/dL Final   ]  GFR Estimate   Date Value Ref Range Status   12/28/2022 52 (L) >60 mL/min/1.73m2 Final     Comment:     Effective December 21, 2021 eGFRcr in adults is calculated using the 2021 CKD-EPI creatinine equation which includes age and gender (Param holt al., NEJM, DOI: 10.1056/FRDXpc8827026)   01/08/2021 68 >60 mL/min/[1.73_m2] Final     Comment:     Non  GFR Calc  Starting 12/18/2018, serum creatinine based estimated GFR (eGFR) will be   calculated using the Chronic Kidney Disease Epidemiology Collaboration   (CKD-EPI) equation.       GFR Estimate If Black   Date Value Ref Range Status   01/08/2021 79 >60 mL/min/[1.73_m2] Final     Comment:      GFR Calc  Starting 12/18/2018, serum creatinine " based estimated GFR (eGFR) will be   calculated using the Chronic Kidney Disease Epidemiology Collaboration   (CKD-EPI) equation.       Lab Results   Component Value Date    CR 1.36 12/28/2022    CR 1.03 01/08/2021     No results found for: MICROALBUMIN    Healthy Eating:  Healthy Eating Assessed Today: Yes  Meal planning/habits: Low salt, Smaller portions  Meals include: Breakfast, Lunch, Dinner, Afternoon Snack  Breakfast: 2-3 pancakes, coffee/tea w/ 1 tsp sugar  Lunch: chicken, 1 cup rice, cucumber salad, water or 4 oz fruit juice  Dinner: chicken/beef, 1 cup sticky rice, water  Snacks: PM - fruit (banana or orange)  Beverages: Coffee, Water, Juice  Has patient met with a dietitian in the past?: Yes    Being Active:  Being Active Assessed Today: Yes  Exercise:: Yes  Days per week of moderate to strenuous exercise (like a brisk walk): 7  On average, minutes per day of exercise at this level: 40  How intense was your typical exercise? : Moderate (like brisk walking)  Exercise Minutes per Week: 280  Barrier to exercise: None    Monitoring:  Monitoring Assessed Today: Yes  Did patient bring glucose meter to appointment? : Yes  Times checking blood sugar at home (number): 1  Times checking blood sugar at home (per): Day  Blood glucose trend:  (100-149)        Taking Medications:  Diabetes Medication(s)     Biguanides       metFORMIN (GLUCOPHAGE) 500 MG tablet    TAKE 2 TABLET BY MOUTH EVERY MORNING AND 1 TO 2 TABLET BY MOUTH WITH SUPPER AS DIRECTED          Taking Medication Assessed Today: Yes  Current Treatments: Oral Medication (taken by mouth)  Problems taking diabetes medications regularly?: No  Diabetes medication side effects?: No    Problem Solving:                 Reducing Risks:  Reducing Risks Assessed Today: Yes  Has dilated eye exam at least once a year?: Yes  Sees dentist every 6 months?: No  Feet checked by healthcare provider in the last year?: Yes    Healthy Coping:  Healthy Coping Assessed Today:  Yes  Emotional response to diabetes: Ready to learn  Informal Support system:: Family  Stage of change: ACTION (Actively working towards change)  Support resources: None  Patient Activation Measure Survey Score:  JERMAIN Score (Last Two) 4/18/2012   JERMAIN Raw Score 38   Activation Score 52.9   JERMAIN Level 2         Care Plan and Education Provided:  Care Plan: Diabetes   Updates made by Gemma Mayo RD since 2/1/2023 12:00 AM      Problem: HbA1C Not In Goal       Goal: Establish Regular Follow-Ups with PCP       Task: Discuss with PCP the recommended timing for patient's next follow up visit(s)    Responsible User: Gemma Mayo RD      Task: Discuss schedule for PCP visits with patient    Responsible User: Gemma Mayo RD      Goal: Get HbA1C Level in Goal       Task: Educate patient on diabetes education self-management topics    Responsible User: Gemma Mayo RD      Task: Educate patient on benefits of regular glucose monitoring    Responsible User: Gemma Mayo RD      Task: Refer patient to appropriate extended care team member, as needed (Medication Therapy Management, Behavioral Health, Physical Therapy, etc.)    Responsible User: Gemma Mayo RD      Task: Discuss diabetes treatment plan with patient    Responsible User: Gemma Mayo RD      Problem: Diabetes Self-Management Education Needed to Optimize Self-Care Behaviors       Goal: Understand diabetes pathophysiology and disease progression       Task: Provide education on diabetes pathophysiology and disease progression specfic to patient's diabetes type    Responsible User: Gemma Mayo RD      Goal: Healthy Eating - follow a healthy eating pattern for diabetes       Task: Provide education on portion control and consistency in amount, composition and timing of food intake Completed 2/1/2023   Responsible User: Gemma Mayo RD      Task: Provide education on managing carbohydrate intake (carbohydrate  counting, plate planning method, etc.) Completed 2/1/2023   Responsible User: Gemma Mayo RD      Task: Provide education on weight management    Responsible User: Gemma Mayo RD      Task: Provide education on heart healthy eating    Responsible User: Gemma Mayo RD      Task: Provide education on eating out    Responsible User: Gemma Mayo RD      Task: Develop individualized healthy eating plan with patient    Responsible User: Gemma Mayo RD      Goal: Being Active - get regular physical activity, working up to at least 150 minutes per week       Task: Provide education on relationship of activity to glucose and precautions to take if at risk for low glucose Completed 2/1/2023   Responsible User: Gemma Mayo RD      Task: Discuss barriers to physical activity with patient    Responsible User: Gemma aMyo RD      Task: Develop physical activity plan with patient    Responsible User: Gemma Mayo RD      Task: Explore community resources including walking groups, assistance programs, and home videos    Responsible User: Gemma Mayo RD      Goal: Monitoring - monitor glucose and ketones as directed    This Visit's Progress: 50%   Note:    I will test my glucose level 2 hours post-meal ~2 times per week.     Task: Provide education on blood glucose monitoring (purpose, proper technique, frequency, glucose targets, interpreting results, when to use glucose control solution, sharps disposal) Completed 2/1/2023   Responsible User: Gemma Mayo RD      Task: Provide education on continuous glucose monitoring (sensor placement, use of karen or /reader, understanding glucose trends, alerts and alarms, differences between sensor glucose and blood glucose)    Responsible User: Gemma Mayo RD      Task: Provide education on ketone monitoring (when to monitor, frequency, etc.)    Responsible User: Gemma Mayo RD      Goal: Taking  Medication - patient is consistently taking medications as directed       Task: Provide education on action of prescribed medication, including when to take and possible side effects Completed 2/1/2023   Responsible User: Gemma Mayo RD      Task: Provide education on insulin and injectable diabetes medications, including administration, storage, site selection and rotation for injection sites    Responsible User: Gemma Mayo RD      Task: Discuss barriers to medication adherence with patient and provide management technique ideas as appropriate    Responsible User: Gemma Mayo RD      Task: Provide education on frequency and refill details of medications    Responsible User: Gemma Mayo RD      Goal: Problem Solving - know how to prevent and manage short-term diabetes complications       Task: Provide education on high blood glucose - causes, signs/symptoms, prevention and treatment    Responsible User: Gemma Mayo RD      Task: Provide education on low blood glucose - causes, signs/symptoms, prevention, treatment, carrying a carbohydrate source at all times, and medical identification    Responsible User: Gemma Mayo RD      Task: Provide education on safe travel with diabetes    Responsible User: Gemma Mayo RD      Task: Provide education on how to care for diabetes on sick days    Responsible User: Gemma Mayo RD      Task: Provide education on when to call a health care provider    Responsible User: Gemma Mayo RD      Goal: Reducing Risks - know how to prevent and treat long-term diabetes complications       Task: Provide education on major complications of diabetes, prevention, early diagnostic measures and treatment of complications    Responsible User: Gemma Mayo RD      Task: Provide education on recommended care for dental, eye and foot health Completed 2/1/2023   Responsible User: Gemma Mayo RD      Task: Provide education on  Hemoglobin A1c - goals and relationship to blood glucose levels    Responsible User: Gemma Mayo RD      Task: Provide education on recommendations for heart health - lipid levels and goals, blood pressure and goals, and aspirin therapy, if indicated    Responsible User: Gemma Mayo RD      Task: Provide education on tobacco cessation    Responsible User: Gemma Mayo RD      Goal: Healthy Coping - use available resources to cope with the challenges of managing diabetes       Task: Discuss recognizing feelings about having diabetes    Responsible User: Gemma Mayo RD      Task: Provide education on the benefits of making appropriate lifestyle changes Completed 2/1/2023   Responsible User: Gemma Mayo RD      Task: Provide education on benefits of utilizing support systems    Responsible User: Gemma Mayo RD      Task: Discuss methods for coping with stress    Responsible User: Gemma Mayo RD      Task: Provide education on when to seek professional counseling    Responsible User: Gemma Mayo RD Tami Carpenter, RDN LD [facility]      Time Spent: 30 minutes  Encounter Type: Individual    Any diabetes medication dose changes were made via the CDE Protocol per the patient's referring provider. A copy of this encounter was shared with the provider.

## 2023-02-07 ENCOUNTER — PATIENT OUTREACH (OUTPATIENT)
Dept: GERIATRIC MEDICINE | Facility: CLINIC | Age: 83
End: 2023-02-07
Payer: COMMERCIAL

## 2023-02-07 NOTE — PROGRESS NOTES
Encounter opened due to Regulatory [employer] Update to open FVP Program.    Trini Keys  Case Management Specialist  [facility]  [phone]

## 2023-02-07 NOTE — PROGRESS NOTES
Encounter opened due to Regulatory [employer] Update to close FVP Program.    Trini Keys  Case Management Specialist  [facility]  [phone]

## 2023-03-11 DIAGNOSIS — E78.5 HYPERLIPIDEMIA LDL GOAL <100: ICD-10-CM

## 2023-03-13 ENCOUNTER — PATIENT OUTREACH (OUTPATIENT)
Dept: GERIATRIC MEDICINE | Facility: CLINIC | Age: 83
End: 2023-03-13
Payer: COMMERCIAL

## 2023-03-13 NOTE — PROGRESS NOTES
[facility] Care Coordination Contact    Called member to schedule annual HRA home visit. HRA has been scheduled for March 17th at 10 AM. Confirmed member's address..   Call placed to [facility] to request an  for in person assessment.  Emerita Pineda RN, BC  Manager [facility] Care Coordinator   [phone]  (Fax)

## 2023-03-15 RX ORDER — SIMVASTATIN 20 MG
TABLET ORAL
Qty: 90 TABLET | Refills: 2 | Status: SHIPPED | OUTPATIENT
Start: 2023-03-15 | End: 2023-10-05

## 2023-03-16 NOTE — PROGRESS NOTES
[facility] Care Coordination Contact    Rec'd vm from [facility] that they are not able to provide an in home  but could provide a phone  later in the day.  Call placed to member, rescheduled assessment for 3/21/23 @ 2:30. Call placed to [facility] to request an in person .  Emerita Pineda RN, BC  Manager [facility] Care Coordinator   [phone]  (Fax)

## 2023-03-21 ENCOUNTER — PATIENT OUTREACH (OUTPATIENT)
Dept: GERIATRIC MEDICINE | Facility: CLINIC | Age: 83
End: 2023-03-21
Payer: COMMERCIAL

## 2023-03-21 SDOH — ECONOMIC STABILITY: FOOD INSECURITY: WITHIN THE PAST 12 MONTHS, THE FOOD YOU BOUGHT JUST DIDN'T LAST AND YOU DIDN'T HAVE MONEY TO GET MORE.: NEVER TRUE

## 2023-03-21 SDOH — HEALTH STABILITY: PHYSICAL HEALTH: ON AVERAGE, HOW MANY DAYS PER WEEK DO YOU ENGAGE IN MODERATE TO STRENUOUS EXERCISE (LIKE A BRISK WALK)?: 7 DAYS

## 2023-03-21 SDOH — ECONOMIC STABILITY: FOOD INSECURITY: WITHIN THE PAST 12 MONTHS, YOU WORRIED THAT YOUR FOOD WOULD RUN OUT BEFORE YOU GOT MONEY TO BUY MORE.: NEVER TRUE

## 2023-03-21 SDOH — HEALTH STABILITY: PHYSICAL HEALTH: ON AVERAGE, HOW MANY MINUTES DO YOU ENGAGE IN EXERCISE AT THIS LEVEL?: 30 MIN

## 2023-03-21 SDOH — ECONOMIC STABILITY: INCOME INSECURITY: IN THE LAST 12 MONTHS, WAS THERE A TIME WHEN YOU WERE NOT ABLE TO PAY THE MORTGAGE OR RENT ON TIME?: NO

## 2023-03-21 ASSESSMENT — LIFESTYLE VARIABLES
HOW MANY STANDARD DRINKS CONTAINING ALCOHOL DO YOU HAVE ON A TYPICAL DAY: PATIENT DOES NOT DRINK
AUDIT-C TOTAL SCORE: 0
HOW OFTEN DO YOU HAVE SIX OR MORE DRINKS ON ONE OCCASION: NEVER
HOW OFTEN DO YOU HAVE A DRINK CONTAINING ALCOHOL: NEVER
SKIP TO QUESTIONS 9-10: 1

## 2023-03-21 ASSESSMENT — ACTIVITIES OF DAILY LIVING (ADL): DEPENDENT_IADLS:: CLEANING;LAUNDRY;SHOPPING

## 2023-03-21 ASSESSMENT — PATIENT HEALTH QUESTIONNAIRE - PHQ9: SUM OF ALL RESPONSES TO PHQ QUESTIONS 1-9: 0

## 2023-03-21 NOTE — Clinical Note
Dr. Esquivel, I am a care coordinator with [facility] working with Chip Shanks. We completed his annual assessment,  and we are required to share assessment notes.  Feel free to contact me if you have questions. Thank you, Emerita Pineda RN, BC Manager [facility] Care Coordinator  [phone]  (Fax)

## 2023-03-21 NOTE — PROGRESS NOTES
"[facility] Care Coordination Contact    [facility] Home Visit Assessment     Home visit for Health Risk Assessment with Chip CLIFFORD Rimma completed on March 21, 2023    Type of residence:: Private home -multi level home with stairs  Current living arrangement:: I live in a private home with 2 sons.      Assessment completed with:: Patient (and  through [employer]).  Chip reports that his health is very good and he has no concerns. Chip recently returned from a trip to [facility], he visited with family. Chip continues to exercise daily on treadmill for 30 minutes. Inquired on his DM education classes, \"they talked about reducing my rice.\"  Chip continues to check his BG daily pre-breakfast 101-149.  Chip reports no concerns with his memory, reports that his mood is good, denies feeling down, and reports sleeping very well at night.      Chip is aware of his upcoming lab and PCP appt. Chip would like assistance with scheduling his annual eye exam, he is due in May. Chip will reach out to this CC after his PCP appt for assistance with scheduling recommended appt's.     Current Care Plan  Member currently receiving the following home care services:   NA  Member currently receiving the following community resources: [facility]      Medication Review  Medication reconciliation completed in Epic: Yes  Medication set-up & administration: Independent and sets up on own weekly.  Self-administers medications.  Medication Risk Assessment Medication (1 or more, place referral to MTM): Discussed MTM, member deferred at this time.   MTM Referral Placed: No: deferred    Mental/Behavioral Health   Depression Screening:   PHQ-2 Total Score (Adult) - Positive if 3 or more points; Administer PHQ-9 if positive: 0  PHQ-9 Total Score: 0    Mental health DX:: Yes   Mental health DX how managed:: None    Falls Assessment:   Fallen 2 or more times in the past year?: No   Any fall with injury in the past year?: " No    ADL/IADL Dependencies:   Dependent ADLs:: Ambulation-cane  Dependent IADLs:: Cleaning, Laundry, Shopping    PCA Assessment completed at visit: No     Elderly Waiver Eligibility: Yes-will continue on EW    Care Plan & Recommendations:   CC to assist with scheduling annual eye exam and Medicare Wellness Exam    See LTCC for detailed assessment information.    Follow-Up Plan: Member informed of future contact, plan to f/u with member with a 6 month telephone assessment.  Contact information shared with member and family, encouraged member to call with any questions or concerns at any time.    [place] care continuum providers: Please see Snapshot and Care Management Flowsheets for Specific details of care plan.    This CC note routed to PCP.  Emerita Pineda RN, BC  Manager [facility] Care Coordinator   [phone]  (Fax)

## 2023-03-22 ENCOUNTER — PATIENT OUTREACH (OUTPATIENT)
Dept: GERIATRIC MEDICINE | Facility: CLINIC | Age: 83
End: 2023-03-22
Payer: COMMERCIAL

## 2023-03-22 NOTE — PROGRESS NOTES
[facility] Care Coordination Contact    Received after visit chart from care coordinator.  Completed following tasks: Mailed copy of care plan to client, Updated services in Database, Submitted referrals/auths for Food shelf info, Sent the following resources/forms: food shelf info  and Mailed Safe Medication Disposal      Trini Keys  Case Management Specialist  [facility]  [phone]

## 2023-03-22 NOTE — LETTER
March 22, 2023      [facility]  [address]  [place] [address]      Dear Diego:    At [facility], we re dedicated to improving your health and wellness. Enclosed is the Care Plan developed with you on 3/21/23. Please review the Care Plan carefully.    As a reminder, during your visit we talked about:    Ways to manage your physical and mental health    Using health care to maintain and improve your health     Your preventive care needs     Remember to contact your care coordinator if you:    Are hospitalized, or plan to be hospitalized     Have a fall      Have a change in your physical or mental health    Need help finding support or services    If you have questions, or don t agree with your Care Plan, call me at [phone]. You can also call me if your needs change. TTY users, call the [place] [employer] at (429) or 1-131.749.6570 (tzhhlg-jo-mogpbw relay service).    Sincerely,    Emerita Pineda RN    E-mail: [email or web address]  Phone: [phone]      [facility] is a health plan that contracts with both Medicare and the [place] Medical Assistance (Medicaid) program to provide benefits of both programs to enrollees. Enrollment in [facility] depends on contract renewal.    [id number] accepted    [id number] (07/2022)

## 2023-03-27 ENCOUNTER — PATIENT OUTREACH (OUTPATIENT)
Dept: GERIATRIC MEDICINE | Facility: CLINIC | Age: 83
End: 2023-03-27
Payer: COMMERCIAL

## 2023-03-27 NOTE — LETTER
March 27, 2023    DIEGO CLIFFORD INTHITH  [address]  [place] [address]      Dear Diego:    As a member of [facility] ([facility]), you are provided a care coordinator. I will be your new care coordinator as of 4/1/2023. I will be calling you soon to see how you are doing and determine your needs.    If you have any questions, please feel free to call me at [phone]. If you reach my voice mail, please leave a message and your phone number. If you are hearing impaired, please call the [place] [employer] at 930 or [phone] (vkyuth-hm-pczggm relay service).    I look forward to speaking with you soon.    Sincerely,        Jo-Ann Shen RN  [phone]  [email or web address] is a health plan that contracts with both Medicare and the [place] Medical Assistance (Medicaid) program to provide benefits of both programs to enrollees. Enrollment in [facility] depends on contract renewal.      [facility]  [id number] DHS Approved ([id number])  [id number] (11/18)

## 2023-03-27 NOTE — PROGRESS NOTES
[facility] Care Coordination Contact    Internal CC change effective 4/1/2023.  Mailed member CC Change letter.  Additional tasks to be completed by CMS include: update database & EPIC, enter CC Change in MMIS, and move member files on Q drive.    Trini Keys  Case Management Specialist  [facility]  [phone]

## 2023-03-31 ENCOUNTER — LAB (OUTPATIENT)
Dept: LAB | Facility: CLINIC | Age: 83
End: 2023-03-31
Payer: COMMERCIAL

## 2023-03-31 DIAGNOSIS — E11.21 TYPE 2 DIABETES MELLITUS WITH DIABETIC NEPHROPATHY, WITHOUT LONG-TERM CURRENT USE OF INSULIN (H): ICD-10-CM

## 2023-03-31 LAB — HBA1C MFR BLD: 8.9 % (ref 0–5.6)

## 2023-03-31 PROCEDURE — 83036 HEMOGLOBIN GLYCOSYLATED A1C: CPT

## 2023-03-31 PROCEDURE — 36415 COLL VENOUS BLD VENIPUNCTURE: CPT

## 2023-04-05 ENCOUNTER — OFFICE VISIT (OUTPATIENT)
Dept: INTERNAL MEDICINE | Facility: CLINIC | Age: 83
End: 2023-04-05
Payer: COMMERCIAL

## 2023-04-05 VITALS
SYSTOLIC BLOOD PRESSURE: 128 MMHG | BODY MASS INDEX: 32.79 KG/M2 | DIASTOLIC BLOOD PRESSURE: 70 MMHG | OXYGEN SATURATION: 96 % | HEART RATE: 81 BPM | WEIGHT: 167 LBS | RESPIRATION RATE: 16 BRPM | HEIGHT: 60 IN | TEMPERATURE: 98 F

## 2023-04-05 DIAGNOSIS — Z23 NEED FOR TETANUS BOOSTER: ICD-10-CM

## 2023-04-05 DIAGNOSIS — E78.5 HYPERLIPIDEMIA LDL GOAL <100: ICD-10-CM

## 2023-04-05 DIAGNOSIS — I10 ESSENTIAL HYPERTENSION WITH GOAL BLOOD PRESSURE LESS THAN 140/90: ICD-10-CM

## 2023-04-05 DIAGNOSIS — Z23 NEED FOR DIPHTHERIA-TETANUS-PERTUSSIS (TDAP) VACCINE: ICD-10-CM

## 2023-04-05 DIAGNOSIS — E11.21 TYPE 2 DIABETES MELLITUS WITH DIABETIC NEPHROPATHY, WITHOUT LONG-TERM CURRENT USE OF INSULIN (H): Primary | ICD-10-CM

## 2023-04-05 DIAGNOSIS — Z23 NEED FOR VACCINATION: ICD-10-CM

## 2023-04-05 PROCEDURE — 90714 TD VACC NO PRESV 7 YRS+ IM: CPT | Performed by: INTERNAL MEDICINE

## 2023-04-05 PROCEDURE — 90472 IMMUNIZATION ADMIN EACH ADD: CPT | Performed by: INTERNAL MEDICINE

## 2023-04-05 PROCEDURE — G0009 ADMIN PNEUMOCOCCAL VACCINE: HCPCS | Performed by: INTERNAL MEDICINE

## 2023-04-05 PROCEDURE — 99207 PR FOOT EXAM NO CHARGE: CPT | Performed by: INTERNAL MEDICINE

## 2023-04-05 PROCEDURE — 99214 OFFICE O/P EST MOD 30 MIN: CPT | Mod: 25 | Performed by: INTERNAL MEDICINE

## 2023-04-05 PROCEDURE — 90677 PCV20 VACCINE IM: CPT | Performed by: INTERNAL MEDICINE

## 2023-04-05 NOTE — NURSING NOTE
Prior to immunization administration, verified patients identity using patient s name and date of birth. Please see Immunization Activity for additional information.     Screening Questionnaire for Adult Immunization    Are you sick today?   No   Do you have allergies to medications, food, a vaccine component or latex?   No   Have you ever had a serious reaction after receiving a vaccination?   No   Do you have a long-term health problem with heart, lung, kidney, or metabolic disease (e.g., diabetes), asthma, a blood disorder, no spleen, complement component deficiency, a cochlear implant, or a spinal fluid leak?  Are you on long-term aspirin therapy?   No   Do you have cancer, leukemia, HIV/AIDS, or any other immune system problem?   No   Do you have a parent, brother, or sister with an immune system problem?   No   In the past 3 months, have you taken medications that affect  your immune system, such as prednisone, other steroids, or anticancer drugs; drugs for the treatment of rheumatoid arthritis, Crohn s disease, or psoriasis; or have you had radiation treatments?   No   Have you had a seizure, or a brain or other nervous system problem?   No   During the past year, have you received a transfusion of blood or blood    products, or been given immune (gamma) globulin or antiviral drug?   No   For women: Are you pregnant or is there a chance you could become       pregnant during the next month?   No   Have you received any vaccinations in the past 4 weeks?   No     Immunization questionnaire answers were all negative.      Injection of Tetanus (Td) and Pneumococcal 20 given by Shavonne Burger CMA. Patient instructed to remain in clinic for 15 minutes afterwards, and to report any adverse reactions.     Screening performed by Shavonne Burger CMA on 4/5/2023 at 3:51 PM.

## 2023-04-05 NOTE — PROGRESS NOTES
Assessment & Plan   (E11.21) Type 2 diabetes mellitus with diabetic nephropathy, without long-term current use of insulin (H)  (primary encounter diagnosis)  Comment: Continue current meds and daily walking. Work on dietary changes.   Plan: Hemoglobin A1c FUTURE 3mo, AMB Adult Diabetes         Educator Referral          (E78.5) Hyperlipidemia LDL goal <100  Comment: Last LDL at target. Continue current meds.    (I10) Essential hypertension with goal blood pressure less than 140/90  Comment: BP at target. Continue current meds.     (Z23) Need for vaccination  Plan: TD, PF, 7+YRS (TENIVAC/DECAVAC)               Patient Instructions   No medication changes.     See the diabetes educator again.     Get repeat blood tests in three months, and then see me for an appointment a few days later.       Liam Esquivel MD,   [facility]    Sarabjit Saunders is a 83 year old, presenting for the following health issues:  Diabetes        4/5/2023     2:44 PM   Additional Questions   Roomed by Shavonne PENA CMA   Accompanied by Laotian , phone     History of Present Illness       Diabetes:   He presents for follow up of diabetes.  He is checking home blood glucose one time daily. He checks blood glucose before meals.  Blood glucose is never over 200 and sometimes under 70. When his blood glucose is low, the patient is asymptomatic for confusion, blurred vision, lethargy and reports not feeling dizzy, shaky, or weak.  He has no concerns regarding his diabetes at this time.  He is not experiencing numbness or burning in feet, excessive thirst, blurry vision, weight changes or redness, sores or blisters on feet. The patient has had a diabetic eye exam in the last 12 months. Eye exam performed on 1 year ago. Location of last eye exam [facility].        Hypertension: He presents for follow up of hypertension.  He does check blood pressure  regularly outside of the clinic. Outpatient blood pressures have not  "been over 140/90. He follows a low salt diet.     He eats 2-3 servings of fruits and vegetables daily.He consumes 0 sweetened beverage(s) daily.He exercises with enough effort to increase his heart rate 30 to 60 minutes per day.  He exercises with enough effort to increase his heart rate 7 days per week.   He is taking medications regularly.     The patient saw the diabetes educator for an in-person visit on 2/1/2023 with benefit of an . The note documents that once daily fasting readings ranged from 100-150 with 75% of readings < 130. They discussed reducing portion size of rice at lunch and dinner, also omitting or diluting fruit juice. Also suggested checking post-meal glucoses ~2x/week.     He continues to walk about 30 minutes daily.     However, most recent A1c on 3/31 was elevated at 8.9, up from 7.9 on 12/28/2022.     He is taking metformin at 2000 mg daily, divided BID.   He reported bad side effects from just 1 mg dosing of glimepiride in 2018, and does not want to try this again.     We discussed option of adding an injectable GLP-1 agonist medication once weekly which would help glycemic control and weight reduction.   He wants to work harder on dietary changes before trying this.     Advised f/u appt with diabetes educator before seeing me back in three months.     BP and most recent LDL-Cholesterol well-controlled.    Past medical, family and social histories as well as medications reviewed and updated as needed.    Review of Systems   No dyspnea or cough. No chest discomfort, dizziness or palpitations. No diarrhea, abdominal pain or rectal bleeding.   No acute problems with vision or speech, lateralizing weakness or paresthesias.    ROS: as above or negative for Respiratory, CV, GI, endocrine, neuro systems.       Objective    /70 (BP Location: Right arm, Patient Position: Sitting, Cuff Size: Adult Large)   Pulse 81   Temp 98  F (36.7  C) (Tympanic)   Resp 16   Ht 1.499 m (4' 11\") "   Wt 75.8 kg (167 lb)   SpO2 96%   BMI 33.73 kg/m    Body mass index is 33.73 kg/m .     Physical Exam   GENERAL: healthy, alert and no distress  EYES: Eyes grossly normal to inspection, PERRL and conjunctivae and sclerae normal  RESP: lungs clear to auscultation - no rales, rhonchi or wheezes  CV: regular rate and rhythm, normal S1 S2, no S3 or S4, no murmur, click or rub, no peripheral edema and peripheral pulses strong  MS: no gross musculoskeletal defects noted, no edema  Diabetic foot exam: normal DP and PT pulses, no trophic changes or ulcerative lesions and normal sensory exam

## 2023-04-17 ENCOUNTER — PATIENT OUTREACH (OUTPATIENT)
Dept: GERIATRIC MEDICINE | Facility: CLINIC | Age: 83
End: 2023-04-17
Payer: COMMERCIAL

## 2023-04-17 NOTE — PROGRESS NOTES
[facility] Care Coordination Contact    Rec'd call from member requesting assistance with scheduling his annual DM eye exam. Conference call placed to [facility], scheduled eye exam 5/16/23 @ 1:30. Informed member that he will need to bring his insurance card, photo ID and sunglasses. Member stated understanding. Informed member that he has a new CC assigned 4/1/2023, explained that if he has questions, he can contact this CC.  Emerita Pineda RN, BC  Manager [facility] Care Coordinator   [phone]  (Fax)

## 2023-04-20 ENCOUNTER — PATIENT OUTREACH (OUTPATIENT)
Dept: GERIATRIC MEDICINE | Facility: CLINIC | Age: 83
End: 2023-04-20
Payer: COMMERCIAL

## 2023-04-20 NOTE — PROGRESS NOTES
[facility] Care Coordination Contact    4/20/23--Care coordinator called member to introduce herself.  Left VM advising to feel free to call back with any questions or concerns.  Will follow-up with 6 month assessment in September.    Jo-Ann Shen RN  [facility]  [phone]   Simple: Patient demonstrates quick and easy understanding

## 2023-04-20 NOTE — PROGRESS NOTES
[facility] Care Coordination Contact    4/20/23--Care coordinator called member to introduce herself.  Left VM stating that if he had any questions or concerns to feel free to call back.  Left .  Will follow-up with 6 month in Sept.    Jo-Ann Shen RN  [facility]  [phone]

## 2023-04-24 NOTE — PROGRESS NOTES
[facility] Care Coordination Contact    4/20/23--Care coordinator did call member and leave VM introducing self.  Advised if there were any questions/concerns to call.    Jo-Ann Shen RN  [facility]  [phone]

## 2023-05-16 ENCOUNTER — TRANSFERRED RECORDS (OUTPATIENT)
Dept: HEALTH INFORMATION MANAGEMENT | Facility: CLINIC | Age: 83
End: 2023-05-16
Payer: COMMERCIAL

## 2023-05-16 LAB — RETINOPATHY: POSITIVE

## 2023-05-30 ENCOUNTER — TELEPHONE (OUTPATIENT)
Dept: INTERNAL MEDICINE | Facility: CLINIC | Age: 83
End: 2023-05-30
Payer: COMMERCIAL

## 2023-05-30 NOTE — TELEPHONE ENCOUNTER
Patient Quality Outreach    Patient is due for the following:   Diabetes -  Eye Exam and Diabetic Follow-Up Visit  Physical Annual Wellness Visit    Next Steps:   Patient has upcoming appointment, these items will be addressed at that time. 7/05/2023 appointment with Dr. Esquivel.    Type of outreach:    Chart review performed, no outreach needed.      Questions for provider review:    None           Shavonne Burger, CMA

## 2023-06-28 ENCOUNTER — LAB (OUTPATIENT)
Dept: LAB | Facility: CLINIC | Age: 83
End: 2023-06-28
Payer: COMMERCIAL

## 2023-06-28 DIAGNOSIS — E11.21 TYPE 2 DIABETES MELLITUS WITH DIABETIC NEPHROPATHY, WITHOUT LONG-TERM CURRENT USE OF INSULIN (H): ICD-10-CM

## 2023-06-28 LAB — HBA1C MFR BLD: 7.5 % (ref 0–5.6)

## 2023-06-28 PROCEDURE — 36415 COLL VENOUS BLD VENIPUNCTURE: CPT

## 2023-06-28 PROCEDURE — 83036 HEMOGLOBIN GLYCOSYLATED A1C: CPT

## 2023-07-02 ENCOUNTER — HEALTH MAINTENANCE LETTER (OUTPATIENT)
Age: 83
End: 2023-07-02

## 2023-07-05 ENCOUNTER — OFFICE VISIT (OUTPATIENT)
Dept: INTERNAL MEDICINE | Facility: CLINIC | Age: 83
End: 2023-07-05
Payer: COMMERCIAL

## 2023-07-05 VITALS
RESPIRATION RATE: 16 BRPM | TEMPERATURE: 98.8 F | DIASTOLIC BLOOD PRESSURE: 66 MMHG | BODY MASS INDEX: 32.39 KG/M2 | HEIGHT: 60 IN | OXYGEN SATURATION: 96 % | HEART RATE: 76 BPM | WEIGHT: 165 LBS | SYSTOLIC BLOOD PRESSURE: 117 MMHG

## 2023-07-05 DIAGNOSIS — E11.21 TYPE 2 DIABETES MELLITUS WITH DIABETIC NEPHROPATHY, WITHOUT LONG-TERM CURRENT USE OF INSULIN (H): Primary | ICD-10-CM

## 2023-07-05 DIAGNOSIS — E78.5 HYPERLIPIDEMIA LDL GOAL <100: ICD-10-CM

## 2023-07-05 DIAGNOSIS — I10 ESSENTIAL HYPERTENSION WITH GOAL BLOOD PRESSURE LESS THAN 140/90: ICD-10-CM

## 2023-07-05 PROCEDURE — 99214 OFFICE O/P EST MOD 30 MIN: CPT | Performed by: INTERNAL MEDICINE

## 2023-07-05 NOTE — PROGRESS NOTES
"  Assessment & Plan     (E11.21) Type 2 diabetes mellitus with diabetic nephropathy, without long-term current use of insulin (H)  (primary encounter diagnosis)  Comment: A1c greatly improved from 8.9 to 7.5 with lifestyle modifications. Encouraged patient to continue with these changes. No changes to meds were made. Will follow up in 3 months for recheck A1c.  Plan: **Hemoglobin A1c FUTURE 3mo, Albumin Random         Urine Quantitative with Creat Ratio, **TSH with        free T4 reflex FUTURE 2mo          (E78.5) Hyperlipidemia LDL goal <100  Comment: Will update LDL in 3 months.   Plan: Lipid panel reflex to direct LDL Fasting,         **Comprehensive metabolic panel FUTURE 2mo          (I10) Essential hypertension with goal blood pressure less than 140/90  Comment: BP at target. Continue current meds.  Plan:        BMI:   Estimated body mass index is 33.33 kg/m  as calculated from the following:    Height as of this encounter: 1.499 m (4' 11\").    Weight as of this encounter: 74.8 kg (165 lb).   Weight management plan: Discussed healthy diet and exercise guidelines    The information in this document, created by the physician assistant student for me, accurately reflects the services I personally performed and the decisions made by me. I have reviewed and approved this document for accuracy prior to leaving the patient care area.  July 5, 2023 2:23 PM    Liam Esquivel MD  [facility]    Sarabjit Saunders is a 83 year old, presenting for the following health issues:  Follow Up and Diabetes        7/5/2023     1:53 PM   Additional Questions   Roomed by Shavonne PENA CMA     History of Present Illness       Diabetes:   He presents for follow up of diabetes.  He is checking home blood glucose one time daily. He checks blood glucose before meals.  Blood glucose is never over 200 and never under 70. When his blood glucose is low, the patient is asymptomatic for confusion, blurred vision, lethargy " and reports not feeling dizzy, shaky, or weak.  He has no concerns regarding his diabetes at this time.  He is not experiencing numbness or burning in feet, excessive thirst, blurry vision, weight changes or redness, sores or blisters on feet.         He eats 0-1 servings of fruits and vegetables daily.He consumes 0 sweetened beverage(s) daily.He exercises with enough effort to increase his heart rate 30 to 60 minutes per day.  He exercises with enough effort to increase his heart rate 7 days per week.   He is taking medications regularly.     Diana interpretor present by phone during interview.     Reviewed that A1c improved from 8.9 three months ago to 7.5 today. At LOV we discussed starting a GLP-1 agonist but patient preferred to hold off and make lifestyle modifications instead. Since then he has decreased his food intake, increased vegetable intake, and decreased meat consumption. He states he is able to continue with this diet. He is currently on metformin 1,000 mg BID. He requests refill of lancets and metformin. He is not on glimepiride as he was not able to tolerate the medication due to side effects.     Past medical, family and social histories as well as medications reviewed and updated as needed.    Review of Systems   No dyspnea or cough. No chest discomfort, dizziness or palpitations. No diarrhea, abdominal pain or rectal bleeding.   No acute problems with vision or speech, lateralizing weakness or paresthesias.    ROS: as above or negative for Respiratory, CV, GI, endocrine, neuro systems.    This document serves as a record of the services and decisions personally performed and made by Liam Esquivel MD. It was created on his behalf by Becky Gomez, a physician assistant student. The creation of this document is based on the provider's statements to the student.  July 5, 2023 2:08 PM          Objective    /66 (BP Location: Left arm, Patient Position: Sitting, Cuff Size: Adult Large)    "Pulse 76   Temp 98.8  F (37.1  C) (Oral)   Resp 16   Ht 1.499 m (4' 11\")   Wt 74.8 kg (165 lb)   SpO2 96%   BMI 33.33 kg/m    Body mass index is 33.33 kg/m .     Physical Exam   GENERAL: healthy, alert and no distress  RESP: lungs clear to auscultation - no rales, rhonchi or wheezes  CV: regular rate and rhythm, normal S1 S2, no S3 or S4, no murmur, click or rub, no peripheral edema and peripheral pulses strong  MS: no gross musculoskeletal defects noted, no edema  SKIN: no suspicious lesions or rashes  NEURO: Normal strength and tone, mentation intact and speech normal  PSYCH: mentation appears normal, affect normal/bright    Labs reviewed in Epic.               "

## 2023-08-07 DIAGNOSIS — E11.21 TYPE 2 DIABETES MELLITUS WITH DIABETIC NEPHROPATHY, WITHOUT LONG-TERM CURRENT USE OF INSULIN (H): ICD-10-CM

## 2023-08-07 DIAGNOSIS — L50.9 URTICARIA: ICD-10-CM

## 2023-08-08 RX ORDER — CETIRIZINE HYDROCHLORIDE 10 MG/1
TABLET ORAL
Qty: 90 TABLET | Refills: 0 | Status: SHIPPED | OUTPATIENT
Start: 2023-08-08 | End: 2023-10-26

## 2023-08-08 RX ORDER — LANCETS 30 GAUGE
EACH MISCELLANEOUS
Qty: 100 EACH | Refills: 1 | Status: SHIPPED | OUTPATIENT
Start: 2023-08-08 | End: 2024-05-07

## 2023-09-29 ENCOUNTER — LAB (OUTPATIENT)
Dept: LAB | Facility: CLINIC | Age: 83
End: 2023-09-29
Payer: COMMERCIAL

## 2023-09-29 DIAGNOSIS — E78.5 HYPERLIPIDEMIA LDL GOAL <100: ICD-10-CM

## 2023-09-29 DIAGNOSIS — E11.21 TYPE 2 DIABETES MELLITUS WITH DIABETIC NEPHROPATHY, WITHOUT LONG-TERM CURRENT USE OF INSULIN (H): ICD-10-CM

## 2023-09-29 LAB
ALBUMIN SERPL BCG-MCNC: 4.3 G/DL (ref 3.5–5.2)
ALP SERPL-CCNC: 47 U/L (ref 40–129)
ALT SERPL W P-5'-P-CCNC: 43 U/L (ref 0–70)
ANION GAP SERPL CALCULATED.3IONS-SCNC: 14 MMOL/L (ref 7–15)
AST SERPL W P-5'-P-CCNC: 47 U/L (ref 0–45)
BILIRUB SERPL-MCNC: 0.6 MG/DL
BUN SERPL-MCNC: 20.8 MG/DL (ref 8–23)
CALCIUM SERPL-MCNC: 9.7 MG/DL (ref 8.8–10.2)
CHLORIDE SERPL-SCNC: 102 MMOL/L (ref 98–107)
CHOLEST SERPL-MCNC: 125 MG/DL
CREAT SERPL-MCNC: 1.57 MG/DL (ref 0.67–1.17)
CREAT UR-MCNC: 212 MG/DL
DEPRECATED HCO3 PLAS-SCNC: 23 MMOL/L (ref 22–29)
EGFRCR SERPLBLD CKD-EPI 2021: 44 ML/MIN/1.73M2
GLUCOSE SERPL-MCNC: 110 MG/DL (ref 70–99)
HBA1C MFR BLD: 7.4 % (ref 0–5.6)
HDLC SERPL-MCNC: 35 MG/DL
LDLC SERPL CALC-MCNC: 57 MG/DL
MICROALBUMIN UR-MCNC: 275 MG/L
MICROALBUMIN/CREAT UR: 129.72 MG/G CR (ref 0–17)
NONHDLC SERPL-MCNC: 90 MG/DL
POTASSIUM SERPL-SCNC: 4.3 MMOL/L (ref 3.4–5.3)
PROT SERPL-MCNC: 8.7 G/DL (ref 6.4–8.3)
SODIUM SERPL-SCNC: 139 MMOL/L (ref 135–145)
TRIGL SERPL-MCNC: 163 MG/DL
TSH SERPL DL<=0.005 MIU/L-ACNC: 1.31 UIU/ML (ref 0.3–4.2)

## 2023-09-29 PROCEDURE — 36415 COLL VENOUS BLD VENIPUNCTURE: CPT

## 2023-09-29 PROCEDURE — 82570 ASSAY OF URINE CREATININE: CPT

## 2023-09-29 PROCEDURE — 84443 ASSAY THYROID STIM HORMONE: CPT

## 2023-09-29 PROCEDURE — 82043 UR ALBUMIN QUANTITATIVE: CPT

## 2023-09-29 PROCEDURE — 80053 COMPREHEN METABOLIC PANEL: CPT

## 2023-09-29 PROCEDURE — 80061 LIPID PANEL: CPT

## 2023-09-29 PROCEDURE — 83036 HEMOGLOBIN GLYCOSYLATED A1C: CPT

## 2023-10-05 ENCOUNTER — OFFICE VISIT (OUTPATIENT)
Dept: INTERNAL MEDICINE | Facility: CLINIC | Age: 83
End: 2023-10-05
Payer: COMMERCIAL

## 2023-10-05 VITALS
HEIGHT: 60 IN | HEART RATE: 85 BPM | RESPIRATION RATE: 16 BRPM | WEIGHT: 164 LBS | SYSTOLIC BLOOD PRESSURE: 114 MMHG | OXYGEN SATURATION: 97 % | DIASTOLIC BLOOD PRESSURE: 64 MMHG | BODY MASS INDEX: 32.2 KG/M2 | TEMPERATURE: 98.6 F

## 2023-10-05 DIAGNOSIS — E78.5 HYPERLIPIDEMIA LDL GOAL <100: ICD-10-CM

## 2023-10-05 DIAGNOSIS — I10 ESSENTIAL HYPERTENSION WITH GOAL BLOOD PRESSURE LESS THAN 140/90: Chronic | ICD-10-CM

## 2023-10-05 DIAGNOSIS — E11.21 TYPE 2 DIABETES MELLITUS WITH DIABETIC NEPHROPATHY, WITHOUT LONG-TERM CURRENT USE OF INSULIN (H): Primary | ICD-10-CM

## 2023-10-05 PROCEDURE — 99214 OFFICE O/P EST MOD 30 MIN: CPT | Performed by: INTERNAL MEDICINE

## 2023-10-05 RX ORDER — AMLODIPINE BESYLATE 5 MG/1
5 TABLET ORAL DAILY
Qty: 90 TABLET | Refills: 3 | Status: SHIPPED | OUTPATIENT
Start: 2023-10-05 | End: 2024-09-14

## 2023-10-05 RX ORDER — LATANOPROST 50 UG/ML
SOLUTION/ DROPS OPHTHALMIC
Status: CANCELLED | OUTPATIENT
Start: 2023-10-05

## 2023-10-05 RX ORDER — BLOOD SUGAR DIAGNOSTIC
STRIP MISCELLANEOUS
Qty: 100 STRIP | Refills: 3 | Status: SHIPPED | OUTPATIENT
Start: 2023-10-05

## 2023-10-05 RX ORDER — LOSARTAN POTASSIUM 100 MG/1
100 TABLET ORAL DAILY
Qty: 90 TABLET | Refills: 3 | Status: SHIPPED | OUTPATIENT
Start: 2023-10-05 | End: 2024-09-13

## 2023-10-05 RX ORDER — LANCETS
EACH MISCELLANEOUS
Qty: 200 EACH | Refills: 1 | Status: SHIPPED | OUTPATIENT
Start: 2023-10-05

## 2023-10-05 RX ORDER — HYDROCHLOROTHIAZIDE 12.5 MG/1
CAPSULE ORAL
Qty: 90 CAPSULE | Refills: 3 | Status: SHIPPED | OUTPATIENT
Start: 2023-10-05

## 2023-10-05 RX ORDER — SIMVASTATIN 20 MG
20 TABLET ORAL AT BEDTIME
Qty: 90 TABLET | Refills: 3 | Status: SHIPPED | OUTPATIENT
Start: 2023-10-05 | End: 2024-09-14

## 2023-10-05 RX ORDER — ATENOLOL 50 MG/1
50 TABLET ORAL DAILY
Qty: 90 TABLET | Refills: 3 | Status: SHIPPED | OUTPATIENT
Start: 2023-10-05 | End: 2024-09-13

## 2023-10-05 NOTE — PATIENT INSTRUCTIONS
Everything looks fine!    Refills of medication have been faxed to your pharmacy.     Lab results look very good.     See me in six months with fasting labs a few days in advance.

## 2023-10-05 NOTE — COMMUNITY RESOURCES LIST (ENGLISH)
10/05/2023   [facility]  N/A  For questions about this resource list or additional care needs, please contact your primary care clinic or care manager.  Phone: [phone]   Email: N/A   Address: [address] [place] [address]   Hours: N/A        Financial Stability       Rent and mortgage payment assistance  1  [address] [place] [address] Distance: 1.81 miles      In-Person, Phone/Virtual   [address] [place] [address]  Language: English, Croatian  Hours: Mon - Thu 9:00 AM - 4:00 PM  Fees: Free   Phone: [phone]) [phone] Email: info@[employer] Website: https://EagerPanda/     2  [address] [place] [address] - Rent payment assistance Distance: 5.1 miles      In-Person, Phone/Virtual   [address] [place] [address]  Language: English  Hours: Mon 8:00 AM - 4:00 PM , Tue 8:00 AM - 7:00 PM , Wed - Thu 8:00 AM - 4:00 PM  Fees: Free   Phone: (861) 548-2073 Email: info@[employer]Modus[employer] Website: https://EagerPanda/resources/resource-centers/          Important Numbers & Websites       Emergency Services   911  City Services   311  Poison Control   (825) 376-3453  Suicide Prevention Lifeline   (391) 180-7584 (TALK)  Child Abuse Hotline   (358) 146-4901 (4-A-Child)  Sexual Assault Hotline   (678) 394-4167 (HOPE)  National Runaway Safeline   (499) 649-9401 (RUNAWAY)  All-Options Talkline   (577) 295-6964  Substance Abuse Referral   ([phone] 530-1718 (HELP)

## 2023-10-05 NOTE — COMMUNITY RESOURCES LIST (PATIENT PREFERRED LANGUAGE)
10/05/2023   [facility]  N/A  ?????????????????????????????????????????????????????????????????????????, ????????????????????????????????????????????????????????.  Phone: [phone]   Email: N/A   Address: [address] [place] [address]   Hours: N/A        ????????????????????????       ????????????????????????????????????????????????  1  360 ?????? - ??????????????????? [place] ???????: 1.81 ??      ??????, ??????? / Virtual   [address] [place] [address]  ????: ??????????, ????????  ???????: ?????? - ???????? 9:00 - 16:00  ????????: ???   Phone: [phone] Email: info@360Communities.org Website: https://360communities.org/     2  360 ?????? - ??????????????????? [place] - ???????????????????????????????????? ???????: 5.1 ??      ??????, ??????? / Virtual   [address] [place] [address]  ????: ??????????  ???????: ?????? 8:00 - 16:00 , ????????? 8:00 - 19:00 , ?????? - ???????? 8:00 - 16:00  ????????: ???   Phone: [phone] 239-4549 Email: info@[employer].org Website: https://jobandtalent.org/resources/resource-centers/          ??????????????? & ????????       ????????   [address] [place] [address].org  ????????????   [phone] [facility].org  Suicide ???????????????????   [address] [place], [place] [address]line.org  ??????????????????????????????? [facility]   [phone] [facility].org  ?????????????????????????????????   [phone] (HOPE) [facility].org  ????????????????? Runaway   [phone] (RUNAWAY) [address] [place] [address].org  ??????????????????? ?????????????? ?????????   Call/text [phone] Ppsupportmn.org  ????????????????????????????????????? ([facility]   [phone]-HELP (4307) [facility] Americ[facility].gov  ??????????????   271

## 2023-10-05 NOTE — COMMUNITY RESOURCES LIST (PATIENT PREFERRED LANGUAGE)
10/05/2023   [facility]  N/A  ?????????????????????????????????????????????????????????????????????????, ????????????????????????????????????????????????????????.  Phone: [phone]   Email: N/A   Address: [address] [place] [address]   Hours: N/A        ????????????????????????       ???????????????????????????????????? ?? ???  1  360 ?????? - ??????????????????? [place] - ???????????????????????????????????????????????? ???????: 1.81 ??      ??????, ??????? / Virtual   [address] [place] [address]  ????: ??????????, ????????  ???????: ?????? - ???????? 9:00 - 16:00  ????????: ???   Phone: [phone] Email: ramesh@ThemBid.org Website: https://Livemap.org/     2  360 ?????? - ??????????????????? [place] - ???????????????????????????????????? ???????: 5.1 ??      ??????, ??????? / Virtual   [address] [place] [address]  ????: ??????????  ???????: ?????? 8:00 - 16:00 , ????????? 8:00 - 19:00 , ?????? - ???????? 8:00 - 16:00  ????????: ???   Phone: [phone] 609-4462 Email: info@[employer]id.org Website: https://Livemap.org/resources/resource-centers/          ??????????????? & ????????       ??????????????   911  ???????????????   311  ???????????????   [phone]  Lifeline ??????????????????????   [phone] (TALK)  ????????????????????????   [phone] (4-A-Child)  ??????????????????   [phone] (HOPE)  National Runaway Safeline   ([phone] (RUNAWAY)  ???????? Talkline ??? ???   [phone]  ????????????? ?? ????????? ???? ???   [phone] (HELP)

## 2023-10-05 NOTE — PROGRESS NOTES
Assessment & Plan   (E11.21) Type 2 diabetes mellitus with diabetic nephropathy, without long-term current use of insulin (H)  (primary encounter diagnosis)  Comment: A1c at target. Continue current measures.   Plan: losartan (COZAAR) 100 MG tablet, metFORMIN         (GLUCOPHAGE) 500 MG tablet, Microlet Lancets         MISC, blood glucose (ONETOUCH ULTRA) test strip          (I10) Essential hypertension with goal blood pressure less than 140/90  Comment: BP at target. Continue current meds.   Plan: amLODIPine (NORVASC) 5 MG tablet, atenolol         (TENORMIN) 50 MG tablet, hydrochlorothiazide         (MICROZIDE) 12.5 MG capsule, losartan (COZAAR)         100 MG tablet          (E78.5) Hyperlipidemia LDL goal <100  Comment: LDL at target. Continue current meds.   Plan: simvastatin (ZOCOR) 20 MG tablet             Patient Instructions   Everything looks fine!    Refills of medication have been faxed to your pharmacy.     Lab results look very good.     See me in six months with fasting labs a few days in advance.     Liam Esquivel MD,   [facility]    Sarabjit Saunders is a 83 year old, presenting for the following health issues:  Follow Up and Diabetes      10/5/2023     3:16 PM   Additional Questions   Roomed by Shavonne PENA CMA       History of Present Illness       Diabetes:   He presents for follow up of diabetes.  He is checking home blood glucose one time daily.   He checks blood glucose before meals.  Blood glucose is never over 200 and never under 70. He is aware of hypoglycemia symptoms including none.    He has no concerns regarding his diabetes at this time.   He is not experiencing numbness or burning in feet, excessive thirst, blurry vision, weight changes or redness, sores or blisters on feet.           He eats 2-3 servings of fruits and vegetables daily.He consumes 0 sweetened beverage(s) daily.He exercises with enough effort to increase his heart rate 30 to 60 minutes per day.   "He exercises with enough effort to increase his heart rate 7 days per week.   He is taking medications regularly.     Patient seen with benefit of phone  (scar).   He feels well and offers no complaints. He reports that AM glucoses generally run \"around 114\".    He will soon be traveling to [facility] to visit his sister, returning in around January. He needs refills of all medications.     The patient is tolerating his current medications without any adverse effects.   LDL-Cholesterol and A1c at target. BP at target.     Past medical, family and social histories as well as medications reviewed and updated as needed.    Review of Systems   No dyspnea or cough. No chest discomfort, dizziness or palpitations. No diarrhea, abdominal pain or rectal bleeding.   No acute problems with vision or speech, lateralizing weakness or paresthesias.    ROS: as above or negative for Respiratory, CV, GI, endocrine, neuro systems.       Objective    /64 (BP Location: Left arm, Patient Position: Sitting, Cuff Size: Adult Large)   Pulse 85   Temp 98.6  F (37  C) (Oral)   Resp 16   Ht 1.499 m (4' 11\")   Wt 74.4 kg (164 lb)   SpO2 97%   BMI 33.12 kg/m    Body mass index is 33.12 kg/m .    Physical Exam   GENERAL: healthy, alert and no distress  EYES: Eyes grossly normal to inspection, PERRL and conjunctivae and sclerae normal  RESP: lungs clear to auscultation - no rales, rhonchi or wheezes  CV: regular rate and rhythm, normal S1 S2, no S3 or S4, no murmur, click or rub, no peripheral edema and peripheral pulses strong  MS: no gross musculoskeletal defects noted, no edema  NEURO: Normal strength and tone, mentation intact and speech normal  PSYCH: mentation appears normal, affect normal/bright      "

## 2023-10-05 NOTE — COMMUNITY RESOURCES LIST (ENGLISH)
10/05/2023   [facility]  N/A  For additional resource needs, please contact your health insurance member services or your primary care team.  Phone: [phone]   Email: N/A   Address: [address] [place] [address]   Hours: N/A        Financial Stability       Rent and mortgage payment assistance  1  [address] [place] [address] Distance: 1.81 miles      In-Person, Phone/Virtual   [address] [place] [address]  Language: English, Macedonian  Hours: Mon - Thu 9:00 AM - 4:00 PM  Fees: Free   Phone: (973) 373-7406 Email: info@[employer].Y[employer]Pepper Website: https://ClickSquared/     2  [address] [place] [address] - Rent payment assistance Distance: 5.1 miles      In-Person, Phone/Virtual   [address] [place] [address]  Language: English  Hours: Mon 8:00 AM - 4:00 PM , Tue 8:00 AM - 7:00 PM , Wed - Thu 8:00 AM - 4:00 PM  Fees: Free   Phone: (915) 627-6941 Email: info@[employer] Website: https://ClickSquared/resources/resource-centers/          Important Numbers & Websites       [address].org  Poison Control   (825) 494-5673 [place]  Suicide and Crisis Lifeline   [address] [place], [place] [address]line.org  Childhelp [place] Child Abuse Hotline   887.753.4772 Childhelphotline.org  National Sexual Assault Hotline   (257) 792-9135 (HOPE) Rainn.org  National Runaway Safeline   (133) 181-2614 (RUNAWAY) 1800runaway.org  Pregnancy & Postpartum Support [place]   Call/text [phone] Ppsupportmn.org  Substance Abuse National Helpline ([facility]   677-226-HELP (1946) Findtreatment.gov  Emergency Services   911

## 2023-11-06 ENCOUNTER — PATIENT OUTREACH (OUTPATIENT)
Dept: GERIATRIC MEDICINE | Facility: CLINIC | Age: 83
End: 2023-11-06
Payer: COMMERCIAL

## 2023-11-06 NOTE — PROGRESS NOTES
[facility] Care Coordination Contact      [facility] Six-Month Telephone Assessment    6 month telephone assessment completed on 11/6/23.    ER visits: No  Hospitalizations: No  TCU stays: No  Significant health status changes: No significant health care changes  Falls/Injuries: No  ADL/IADL changes: No  Changes in services: No    Caregiver Assessment follow up: No changes in services recommended.  Chip continues to independently care for himself and has no concerns at this time.    Goals: See POC in chart for goal progress documentation. Goals not met.  Will continue and work towards meeting prior to annual assessment.    Will see member in 6 months for an annual health risk assessment.   Encouraged member to call CC with any questions or concerns in the meantime.     Jo-Ann Shen RN  [facility]  [phone]

## 2023-11-06 NOTE — PROGRESS NOTES
[facility] Care Coordination Contact    11/6/23--Chip left VM over the weekend requesting call back as he needed assistance with his social security.    Jo-Ann Shen RN  [facility]  [phone]    11/6/23--Care coordinator did return phone call and left VM with interpretor requesting call back.    11/6/23--Chip did call care coordinator back and denied the need for an interpretor.  Chip stated he gets a social security payment monthly automatically deposited of $377.  No payment has been made for November and is requesting assistance in resolving.  Care coordinator asked if he has reached out to his daughter for assistance to which he answered he called her but she has not returned call.  Care coordinator asked if he tried calling social security and he stated he didn't have a number.  He then gave me a number of [phone], which was a number to social security.  Care coordinator will determine who is to handle/assist and will call member back.    11/6/23--Chip's daughter, Darwin, called care coordinator while at her Dad's home.  She stated he discussed with her the social security issue and she discovered it was because he had not changed his address with the social security office.  She stated they were instructed to go to the social security office to complete the necessary paperwork and reinstate his payments.  Per the office, no appointment could be made for this task.  Darwin stated she would assist with completing this and currently financially, it was not a hardship.  Care coordinator also confirmed at this time that correct address is on file in [employer]otopedia & [employer].    Jo-Ann Shen RN  [facility]  [phone]

## 2024-02-02 ENCOUNTER — PATIENT OUTREACH (OUTPATIENT)
Dept: GERIATRIC MEDICINE | Facility: CLINIC | Age: 84
End: 2024-02-02
Payer: COMMERCIAL

## 2024-02-02 NOTE — PROGRESS NOTES
[facility] Care Coordination Contact    Called member to schedule annual HRA home visit. HRA has been scheduled for 2/7/24 @ 12N.  Called Dianelys Andino and scheduled an  for the home visit.  Job #[id number]    Jo-Ann Shen RN  [facility]  [phone]    2/6/2024--Care coordinator called [employer] to check on request.  Was told they tried calling at 8:43a to advise they could not complete in-person but could offer phone.  Care coordinator declined phone assistance and made request with [employer], job #[id number].  [employer] emailed shortly after that  has been confirmed for in-person.

## 2024-02-06 ENCOUNTER — PATIENT OUTREACH (OUTPATIENT)
Dept: GERIATRIC MEDICINE | Facility: CLINIC | Age: 84
End: 2024-02-06
Payer: COMMERCIAL

## 2024-02-06 NOTE — PROGRESS NOTES
[facility] Care Coordination Contact    2/5/2024--Chip called stating he needed assistance getting an appointment to reinstate his social security as [place] had been [place].  Care coordinator asked if he had reached out to his daughter for assistance and she told him she couldn't help until next week.  He will wait until then to complete.    Jo-Ann Shen RN  [facility]  [phone]

## 2024-02-07 ENCOUNTER — PATIENT OUTREACH (OUTPATIENT)
Dept: GERIATRIC MEDICINE | Facility: CLINIC | Age: 84
End: 2024-02-07
Payer: COMMERCIAL

## 2024-02-07 DIAGNOSIS — I10 ESSENTIAL HYPERTENSION WITH GOAL BLOOD PRESSURE LESS THAN 140/90: Primary | ICD-10-CM

## 2024-02-07 DIAGNOSIS — M25.561 RIGHT KNEE PAIN, UNSPECIFIED CHRONICITY: ICD-10-CM

## 2024-02-07 ASSESSMENT — PATIENT HEALTH QUESTIONNAIRE - PHQ9: SUM OF ALL RESPONSES TO PHQ QUESTIONS 1-9: 1

## 2024-02-07 ASSESSMENT — LIFESTYLE VARIABLES
AUDIT-C TOTAL SCORE: 0
SKIP TO QUESTIONS 9-10: 1

## 2024-02-07 ASSESSMENT — ACTIVITIES OF DAILY LIVING (ADL): DEPENDENT_IADLS:: SHOPPING

## 2024-02-07 NOTE — PROGRESS NOTES
[facility] Care Coordination Contact    [facility] Home Visit Assessment     Home visit for Health Risk Assessment with Chip CLIFFORD Rimma completed on February 7, 2024    Type of residence:: Private home - stairs  Current living arrangement:: I live in a private home with family     Assessment completed with:: Patient, Other (Global )    Current Care Plan  Member currently receiving the following home care services:   n/a  Member currently receiving the following community resources: [employer]      Medication Review  Medication reconciliation completed in Epic: Yes  Medication set-up & administration: Independent and sets up on own weekly.  Self-administers medications.  Medication Risk Assessment Medication (1 or more, place referral to MTM): N/A: No risk factors identified  MTM Referral Placed: No: No risk factors idenified    Mental/Behavioral Health   Depression Screening: n/a     PHQ-9 Total Score: 1    Mental health DX:: Yes (anxiety)   Mental health DX how managed:: None    Falls Assessment:   Fallen 2 or more times in the past year?: No   Any fall with injury in the past year?: No    ADL/IADL Dependencies:   Dependent ADLs:: Ambulation-cane  Dependent IADLs:: Shopping    Health Plan sponsored benefits: [employer]: Shared information regarding One Pass Fitness Program. Reviewed preventative health screening and health plan supplemental benefits/incentives. Reviewed medication disposal form.    PCA Assessment completed at visit: Not Applicable     Elderly Waiver Eligibility: No-does not meet criteria    Care Plan & Recommendations: Chip will continue with same plan/services as previously arranged from last year's assessment.  Chip is requesting to have his Lifeline upgraded to a wrist worn one to provide emergency service calls as needed. (Previous system was a  box that was activated when a button was hit.)  Chip is also requesting a new digital BP cuff as his current  one is broken.  Chip is also interested in starting to use the Freestyle Ronald blood glucose monitoring system.    See [facility] for detailed assessment information.    Follow-Up Plan: Member informed of future contact, plan to f/u with member with a 6 month telephone assessment.  Contact information shared with member and family, encouraged member to call with any questions or concerns at any time.    [facility] care continuum providers: Please see Snapshot and Care Management Flowsheets for Specific details of care plan.    This CC note routed to PCP, Liam Esquivel RN  [facility]  [phone]

## 2024-02-13 ENCOUNTER — TELEPHONE (OUTPATIENT)
Dept: INTERNAL MEDICINE | Facility: CLINIC | Age: 84
End: 2024-02-13
Payer: COMMERCIAL

## 2024-02-13 DIAGNOSIS — M25.561 RIGHT KNEE PAIN, UNSPECIFIED CHRONICITY: ICD-10-CM

## 2024-02-13 RX ORDER — LIDOCAINE 50 MG/G
PATCH TOPICAL
Qty: 30 PATCH | Refills: 4 | Status: SHIPPED | OUTPATIENT
Start: 2024-02-13

## 2024-02-13 RX ORDER — LIDOCAINE 50 MG/G
PATCH TOPICAL
COMMUNITY
Start: 2024-02-13 | End: 2024-02-13

## 2024-02-20 NOTE — TELEPHONE ENCOUNTER
PRIOR AUTHORIZATION DENIED    Medication: LIDOCAINE 5 % EX PTCH  Insurance Company: [employer] [place] - Phone [phone] Fax [phone]  Denial Date: 2/16/2024  Denial Reason(s):     Appeal Information:     Patient Notified: No

## 2024-02-29 ENCOUNTER — PATIENT OUTREACH (OUTPATIENT)
Dept: GERIATRIC MEDICINE | Facility: CLINIC | Age: 84
End: 2024-02-29
Payer: COMMERCIAL

## 2024-02-29 NOTE — TELEPHONE ENCOUNTER
[facility] Care Coordination Contact    Received after visit chart from care coordinator.  Completed following tasks: Mailed copy of care plan/support plan to member, Mailed Safe Medication Disposal , Submitted referrals/auths for PERS, Uploaded consent to communicate form(s) to Epic, and Updated services in Database    Provider Signature - No POC Shared:  Member indicates that they do not want their POC shared with any EW providers.    Added BP cuff to Ollie and tracking spreadsheet - CC documented order on assessment encounter.    Ana Hodges  Care Management Specialist  [facility]  [phone]

## 2024-03-15 NOTE — TELEPHONE ENCOUNTER
Per [facility], BP monitor was delivered.    Chip Shanks 1940 BP Cuff 2/29/2024 Jo-Ann Maharaj  DELIVERED 3/8/2024     Ana Hodges  Care Management Specialist  [facility]  [phone]

## 2024-03-27 ENCOUNTER — DOCUMENTATION ONLY (OUTPATIENT)
Dept: INTERNAL MEDICINE | Facility: CLINIC | Age: 84
End: 2024-03-27

## 2024-03-27 NOTE — PROGRESS NOTES
Chip Shanks has an upcoming lab appointment:    Future Appointments   Date Time Provider Department Center   4/1/2024  7:45 AM [facility]   4/5/2024 11:00 AM [facility]         There is no order available. Please review and place either future orders or HMPO (Review of Health Maintenance Protocol Orders), as appropriate.    Health Maintenance Due   Topic    ANNUAL REVIEW OF HM ORDERS     A1C      Desiree Doe

## 2024-04-01 ENCOUNTER — PATIENT OUTREACH (OUTPATIENT)
Dept: GERIATRIC MEDICINE | Facility: CLINIC | Age: 84
End: 2024-04-01

## 2024-04-01 NOTE — PROGRESS NOTES
[facility] Care Coordination Contact    04/01/2024--Chip called stating he had gone to lab today to have labs drawn but when he got to lab they told him there were no orders.  Care coordinator advised he would need to call Dr. Esquivel's office to ask about the lab orders that may need to be drawn before his 4/5/2024 appointment.    Jo-Ann Shen RN  [facility]  [phone]

## 2024-04-05 ENCOUNTER — OFFICE VISIT (OUTPATIENT)
Dept: INTERNAL MEDICINE | Facility: CLINIC | Age: 84
End: 2024-04-05
Attending: INTERNAL MEDICINE
Payer: COMMERCIAL

## 2024-04-05 VITALS
WEIGHT: 167 LBS | HEIGHT: 60 IN | SYSTOLIC BLOOD PRESSURE: 139 MMHG | OXYGEN SATURATION: 98 % | RESPIRATION RATE: 16 BRPM | HEART RATE: 78 BPM | DIASTOLIC BLOOD PRESSURE: 73 MMHG | TEMPERATURE: 97.9 F | BODY MASS INDEX: 32.79 KG/M2

## 2024-04-05 DIAGNOSIS — I10 ESSENTIAL HYPERTENSION WITH GOAL BLOOD PRESSURE LESS THAN 140/90: ICD-10-CM

## 2024-04-05 DIAGNOSIS — E78.5 HYPERLIPIDEMIA LDL GOAL <100: ICD-10-CM

## 2024-04-05 DIAGNOSIS — K21.9 GASTROESOPHAGEAL REFLUX DISEASE WITHOUT ESOPHAGITIS: ICD-10-CM

## 2024-04-05 DIAGNOSIS — E11.21 TYPE 2 DIABETES MELLITUS WITH DIABETIC NEPHROPATHY, WITHOUT LONG-TERM CURRENT USE OF INSULIN (H): Primary | ICD-10-CM

## 2024-04-05 LAB — HBA1C MFR BLD: 8.1 % (ref 0–5.6)

## 2024-04-05 PROCEDURE — 36415 COLL VENOUS BLD VENIPUNCTURE: CPT | Performed by: INTERNAL MEDICINE

## 2024-04-05 PROCEDURE — 80061 LIPID PANEL: CPT | Performed by: INTERNAL MEDICINE

## 2024-04-05 PROCEDURE — 99214 OFFICE O/P EST MOD 30 MIN: CPT | Performed by: INTERNAL MEDICINE

## 2024-04-05 PROCEDURE — 83036 HEMOGLOBIN GLYCOSYLATED A1C: CPT | Performed by: INTERNAL MEDICINE

## 2024-04-05 PROCEDURE — 80053 COMPREHEN METABOLIC PANEL: CPT | Performed by: INTERNAL MEDICINE

## 2024-04-05 RX ORDER — RESPIRATORY SYNCYTIAL VIRUS VACCINE 120MCG/0.5
0.5 KIT INTRAMUSCULAR ONCE
Qty: 1 EACH | Refills: 0 | Status: CANCELLED | OUTPATIENT
Start: 2024-04-05 | End: 2024-04-05

## 2024-04-05 RX ORDER — FAMOTIDINE 20 MG/1
20 TABLET, FILM COATED ORAL DAILY
Qty: 180 TABLET | Refills: 1 | Status: SHIPPED | OUTPATIENT
Start: 2024-04-05

## 2024-04-05 NOTE — PROGRESS NOTES
"  Assessment & Plan   (E11.21) Type 2 diabetes mellitus with diabetic nephropathy, without long-term current use of insulin (H)  (primary encounter diagnosis)  Comment: Historically Well controlled. A1c returns higher than expected, after patient's clinic departure. Continue current meds. Recommend renewing diabetes education, and follow up with me in three months.   Plan: Hemoglobin A1c          (E78.5) Hyperlipidemia LDL goal <100  Comment: LDL at target. Continue current meds.   Plan: Comprehensive metabolic panel, Lipid panel         reflex to direct LDL Non-fasting          (I10) Essential hypertension with goal blood pressure less than 140/90  Comment: BP at target. Continue current meds.     (K21.9) Gastroesophageal reflux disease without esophagitis  Comment: Offered Rx for famotidine.   Plan: famotidine (PEPCID) 20 MG tablet              BMI  Estimated body mass index is 33.73 kg/m  as calculated from the following:    Height as of this encounter: 1.499 m (4' 11\").    Weight as of this encounter: 75.8 kg (167 lb).   Weight management plan: Discussed healthy diet and exercise guidelines      Patient Instructions   Let's check labs today before you go home.     New prescription for famotidine 20 mg--may take TWICE per day if needed for heartburn.  No other medication changes.     If today's labs look fine, okay to see me in six months.   If A1c has gone high, see me in three months.     I will place lab orders for your next visit so the lab will know what to do.     Sarabjit Saunders is a 84 year old, presenting for the following health issues:  Follow Up and Diabetes      4/5/2024    10:42 AM   Additional Questions   Roomed by Shavonne PENA CMA     History of Present Illness       Diabetes:   He presents for follow up of diabetes.  He is checking home blood glucose one time daily.   He checks blood glucose before meals.  Blood glucose is never over 200 and never under 70.     He has no concerns regarding his " "diabetes at this time.   He is not experiencing numbness or burning in feet, excessive thirst, blurry vision, weight changes or redness, sores or blisters on feet.       He consumes 0 sweetened beverage(s) daily.He exercises with enough effort to increase his heart rate 30 to 60 minutes per day.  He exercises with enough effort to increase his heart rate 7 days per week.   He is taking medications regularly.     Due to language barrier, a Laotian  was available by phone during the history-taking and subsequent discussion (and for part of the physical exam) with this patient.     The patient reports that morning glucoses generally run around 120 or lower.     Past medical, family and social histories as well as medications reviewed and updated as needed.    No dyspnea or cough. No chest discomfort, dizziness or palpitations. No diarrhea, abdominal pain or rectal bleeding.   No acute problems with vision or speech, lateralizing weakness or paresthesias.    ROS: as above or negative for Respiratory, CV, GI, endocrine, neuro systems.       Objective    /73 (BP Location: Right arm, Patient Position: Sitting, Cuff Size: Adult Large)   Pulse 78   Temp 97.9  F (36.6  C) (Tympanic)   Resp 16   Ht 1.499 m (4' 11\")   Wt 75.8 kg (167 lb)   SpO2 98%   BMI 33.73 kg/m    Body mass index is 33.73 kg/m .    Physical Exam   GENERAL: alert and no distress  RESP: lungs clear to auscultation - no rales, rhonchi or wheezes  CV: regular rate and rhythm, normal S1 S2, no S3 or S4, no murmur, click or rub, no peripheral edema  MS: no gross musculoskeletal defects noted, no edema        Signed Electronically by: Liam Esquivel MD,     "

## 2024-04-05 NOTE — PATIENT INSTRUCTIONS
Let's check labs today before you go home.     New prescription for famotidine 20 mg--may take TWICE per day if needed for heartburn.  No other medication changes.     If today's labs look fine, okay to see me in six months.   If A1c has gone high, see me in three months.     I will place lab orders for your next visit so the lab will know what to do.

## 2024-04-06 LAB
ALBUMIN SERPL BCG-MCNC: 4.3 G/DL (ref 3.5–5.2)
ALP SERPL-CCNC: 51 U/L (ref 40–150)
ALT SERPL W P-5'-P-CCNC: 38 U/L (ref 0–70)
ANION GAP SERPL CALCULATED.3IONS-SCNC: 14 MMOL/L (ref 7–15)
AST SERPL W P-5'-P-CCNC: 34 U/L (ref 0–45)
BILIRUB SERPL-MCNC: 0.3 MG/DL
BUN SERPL-MCNC: 16.2 MG/DL (ref 8–23)
CALCIUM SERPL-MCNC: 9.5 MG/DL (ref 8.8–10.2)
CHLORIDE SERPL-SCNC: 101 MMOL/L (ref 98–107)
CHOLEST SERPL-MCNC: 137 MG/DL
CREAT SERPL-MCNC: 1.8 MG/DL (ref 0.67–1.17)
DEPRECATED HCO3 PLAS-SCNC: 23 MMOL/L (ref 22–29)
EGFRCR SERPLBLD CKD-EPI 2021: 37 ML/MIN/1.73M2
FASTING STATUS PATIENT QL REPORTED: NO
GLUCOSE SERPL-MCNC: 107 MG/DL (ref 70–99)
HDLC SERPL-MCNC: 42 MG/DL
LDLC SERPL CALC-MCNC: 67 MG/DL
NONHDLC SERPL-MCNC: 95 MG/DL
POTASSIUM SERPL-SCNC: 4.2 MMOL/L (ref 3.4–5.3)
PROT SERPL-MCNC: 8.5 G/DL (ref 6.4–8.3)
SODIUM SERPL-SCNC: 138 MMOL/L (ref 135–145)
TRIGL SERPL-MCNC: 139 MG/DL

## 2024-07-11 ENCOUNTER — PATIENT OUTREACH (OUTPATIENT)
Dept: GERIATRIC MEDICINE | Facility: CLINIC | Age: 84
End: 2024-07-11
Payer: COMMERCIAL

## 2024-07-11 NOTE — PROGRESS NOTES
[facility] Care Coordination Contact    7/11/24  Rec'd tele call from member requesting assistance with scheduling an appointment. Member shared that he has not been feeling well, reports sore throat, diarrhea and feeling tired. Conference call with member/ to [facility], first available 7/22/24 but member can be seen in he [place] clinic tomorrow but member stated that the [place] office was too far to travel. Provided UC options/locations, per member the locations were too far for him to travel.   Member stated that he would like to try [facility] in [place]. Noted that member had a Wellness Exam with [facility] 2/2024, but has upcoming appt/labs with Dr. Esquivel at the [facility].   Appt with [facility] in 2/2024, member reported a cough and diarrhea.  CC assisted member with  to contact [facility], no same day appt available but  stated that she will have a nurse call member back to discuss his symptoms. Member stated understanding.   Emerita Pineda RN, BC  Manager [facility] Care Coordinator   [phone]  (Fax)    7/12/24 Call placed to member to inquire on how he is doing and if the nurse called him back yesterday.  Member stated that a nurse called him but did not have an . Member stated that he is feeling better today, stating that he has no more diarrhea and he is taking Pepto Bismol which helps. CC strongly encouraged that he reach out to one of his children to help him schedule an appt or follow up with UC. Member stated that he is feeling better and is not concerned.  Again, encouraged him to schedule an appt if he does not feel well.  Emerita Pineda RN, BC  Manager [facility] Care Coordinator   [phone]  (Fax)

## 2024-07-24 ENCOUNTER — PATIENT OUTREACH (OUTPATIENT)
Dept: GERIATRIC MEDICINE | Facility: CLINIC | Age: 84
End: 2024-07-24
Payer: COMMERCIAL

## 2024-07-24 NOTE — PROGRESS NOTES
[facility] Care Coordination Contact    Internal CC change effective 8/1/2024.  Mailed member CC Change letter.  Additional tasks to be completed by CMS include: update database & EPIC, enter CC Change in MMIS, and move member file.    Delaney Byrnes  Case Management Specialist   [facility]  [phone]

## 2024-08-01 ENCOUNTER — PATIENT OUTREACH (OUTPATIENT)
Dept: GERIATRIC MEDICINE | Facility: CLINIC | Age: 84
End: 2024-08-01
Payer: COMMERCIAL

## 2024-08-01 NOTE — PROGRESS NOTES
[facility] Care Coordination Contact      [facility] Six-Month Telephone Assessment    LM with the  services to call back when an  is available.  Luna Stack RN  [facility]  Cell: [phone]

## 2024-08-06 NOTE — PROGRESS NOTES
[facility] Care Coordination Contact      [facility] Six-Month Telephone Assessment    6 month telephone assessment completed on 8/6/2024.    ER visits: No  Hospitalizations: No  TCU stays: No  Significant health status changes: Chip states that he is doing well and has no concerns.  Falls/Injuries: No  ADL/IADL changes: No  Changes in services: No    Caregiver Assessment follow up:  n/a    Goals: See POC in chart for goal progress documentation.  All goal updated with today's date.    Chip is asking for help with scheduling an appointment with an eye doctor as he is due for a diabetic eye exam.    The care coordinator called [facility] in [place] and scheduled the appointment.  Member was called back and informed of the appointment.  The  will be present at the appointment.    Appointment scheduled:  12.30pm check in on 10/15/24 with Dr. Jb Haider, [place].    -(Dr Harvey who member used to see does not practice in [place] anymore).  -Information for the appointment: bring sunglasses for dilation, ID, and insurance card.    Member was informed of the instructions and date of the appointment.    I inquired regarding transportation to this appointment.  Chip responded that he has a car and drives himself to appointments.  He does not need help with transportation.    Will see member in 6 months for an annual health risk assessment.   Encouraged member to call CC with any questions or concerns in the meantime.     Luna Stack RN  [facility]  Cell: [phone]

## 2024-08-07 ENCOUNTER — PATIENT OUTREACH (OUTPATIENT)
Dept: GERIATRIC MEDICINE | Facility: CLINIC | Age: 84
End: 2024-08-07
Payer: COMMERCIAL

## 2024-08-07 NOTE — PROGRESS NOTES
[facility] Care Coordination Contact    Chip has inquired about getting a new [facility] card mailed to him.  I contacted [facility] customer service at [phone] and requested a new card sent to this member. The card will be mailed to the member and should  be received in 10-14 days.  Chip was called back using  services and notified.    Luna Stack RN  [facility]  Cell: [phone]

## 2024-10-03 ENCOUNTER — DOCUMENTATION ONLY (OUTPATIENT)
Dept: LAB | Facility: CLINIC | Age: 84
End: 2024-10-03
Payer: COMMERCIAL

## 2024-10-03 NOTE — PROGRESS NOTES
Chip Shanks has an upcoming lab appointment:    Future Appointments   Date Time Provider Department Center   10/18/2024  8:00 AM [facility] LAB [facility]   10/23/2024  1:00 PM Liam Esquivel MD [facility]     Patient is scheduled for the following lab(s): Fasting labs    There is no order available. Please review and place either future orders or HMPO (Review of Health Maintenance Protocol Orders), as appropriate.    Health Maintenance Due   Topic    MICROALBUMIN     A1C      [place]ary [place]

## 2024-10-15 ENCOUNTER — TRANSFERRED RECORDS (OUTPATIENT)
Dept: HEALTH INFORMATION MANAGEMENT | Facility: CLINIC | Age: 84
End: 2024-10-15
Payer: COMMERCIAL

## 2024-10-15 LAB — RETINOPATHY: POSITIVE

## 2024-10-18 ENCOUNTER — LAB (OUTPATIENT)
Dept: LAB | Facility: CLINIC | Age: 84
End: 2024-10-18
Payer: COMMERCIAL

## 2024-10-18 DIAGNOSIS — E11.21 TYPE 2 DIABETES MELLITUS WITH DIABETIC NEPHROPATHY (H): Primary | ICD-10-CM

## 2024-10-18 LAB
CREAT UR-MCNC: 128 MG/DL
EST. AVERAGE GLUCOSE BLD GHB EST-MCNC: 163 MG/DL
HBA1C MFR BLD: 7.3 % (ref 0–5.6)
MICROALBUMIN UR-MCNC: 217 MG/L
MICROALBUMIN/CREAT UR: 169.53 MG/G CR (ref 0–17)

## 2024-10-18 PROCEDURE — 83036 HEMOGLOBIN GLYCOSYLATED A1C: CPT

## 2024-10-18 PROCEDURE — 36415 COLL VENOUS BLD VENIPUNCTURE: CPT

## 2024-10-18 PROCEDURE — 82570 ASSAY OF URINE CREATININE: CPT

## 2024-10-18 PROCEDURE — 82043 UR ALBUMIN QUANTITATIVE: CPT

## 2024-10-23 ENCOUNTER — OFFICE VISIT (OUTPATIENT)
Dept: INTERNAL MEDICINE | Facility: CLINIC | Age: 84
End: 2024-10-23
Payer: COMMERCIAL

## 2024-10-23 VITALS
BODY MASS INDEX: 32.39 KG/M2 | HEIGHT: 60 IN | TEMPERATURE: 98.1 F | DIASTOLIC BLOOD PRESSURE: 70 MMHG | HEART RATE: 82 BPM | RESPIRATION RATE: 18 BRPM | SYSTOLIC BLOOD PRESSURE: 119 MMHG | WEIGHT: 165 LBS | OXYGEN SATURATION: 95 %

## 2024-10-23 DIAGNOSIS — E11.21 TYPE 2 DIABETES MELLITUS WITH DIABETIC NEPHROPATHY, WITHOUT LONG-TERM CURRENT USE OF INSULIN (H): Primary | ICD-10-CM

## 2024-10-23 DIAGNOSIS — K21.9 GASTROESOPHAGEAL REFLUX DISEASE WITHOUT ESOPHAGITIS: ICD-10-CM

## 2024-10-23 DIAGNOSIS — E78.5 HYPERLIPIDEMIA LDL GOAL <100: ICD-10-CM

## 2024-10-23 DIAGNOSIS — I10 ESSENTIAL HYPERTENSION WITH GOAL BLOOD PRESSURE LESS THAN 140/90: ICD-10-CM

## 2024-10-23 PROCEDURE — 99214 OFFICE O/P EST MOD 30 MIN: CPT | Performed by: INTERNAL MEDICINE

## 2024-10-23 RX ORDER — LOSARTAN POTASSIUM 100 MG/1
100 TABLET ORAL DAILY
Qty: 90 TABLET | Refills: 3 | Status: SHIPPED | OUTPATIENT
Start: 2024-10-23

## 2024-10-23 RX ORDER — AMLODIPINE BESYLATE 5 MG/1
5 TABLET ORAL DAILY
Qty: 90 TABLET | Refills: 3 | Status: SHIPPED | OUTPATIENT
Start: 2024-10-23

## 2024-10-23 RX ORDER — HYDROCHLOROTHIAZIDE 12.5 MG/1
CAPSULE ORAL
Qty: 90 CAPSULE | Refills: 3 | Status: SHIPPED | OUTPATIENT
Start: 2024-10-23

## 2024-10-23 RX ORDER — SIMVASTATIN 20 MG
20 TABLET ORAL AT BEDTIME
Qty: 90 TABLET | Refills: 3 | Status: SHIPPED | OUTPATIENT
Start: 2024-10-23

## 2024-10-23 RX ORDER — FAMOTIDINE 20 MG/1
20 TABLET, FILM COATED ORAL 2 TIMES DAILY
Qty: 180 TABLET | Refills: 3 | Status: SHIPPED | OUTPATIENT
Start: 2024-10-23

## 2024-10-23 RX ORDER — ATENOLOL 50 MG/1
50 TABLET ORAL DAILY
Qty: 90 TABLET | Refills: 3 | Status: SHIPPED | OUTPATIENT
Start: 2024-10-23

## 2024-10-23 NOTE — PATIENT INSTRUCTIONS
A1c and blood pressure look fine.   Last LDL-Cholesterol looks fine.     Okay to take famotidine TWICE a day.     Refilled all needed prescriptions.     See me in six months, with lab tests a few days in advance.

## 2024-12-19 ENCOUNTER — PATIENT OUTREACH (OUTPATIENT)
Dept: GERIATRIC MEDICINE | Facility: CLINIC | Age: 84
End: 2024-12-19
Payer: COMMERCIAL

## 2024-12-19 NOTE — PROGRESS NOTES
[facility] Care Coordination Contact    Called member to schedule annual home visit. Scheduled for 1/8/25 at 11am.    Luna Stack RN  [facility]  Cell: [phone]        
Pt to consume >75% EER

## 2024-12-28 NOTE — PROGRESS NOTES
[facility] Care Coordination Contact    Rec'd tele call from Funmilayo at [facility] to report that PCP agrees with recommendation to see ENT.  Call placed to member with an , assisted member with scheduling an appt with [facility]. Appointment scheduled for 12/3/20 @ 3 PM. Member was provided telephone number and address to the clinic.  Emerita Pineda RN, BC  Manager [facility] Care Coordinator   [phone]  (Fax)     Speech-Language Pathology                 Therapy Communication Note    Patient Name: Kalyan Armstrong  MRN: [id number]  Department: [facility]  Room: [address] [place], [place] [address]  Today's Date: 12/28/2024     Discipline: Speech Language Pathology          Missed Visit Reason: Missed Time Reason: Unavailable (Comment)    Missed Time: Attempt 12:05    Comment:  Patient out of room for dialysis

## 2025-01-08 ENCOUNTER — PATIENT OUTREACH (OUTPATIENT)
Dept: GERIATRIC MEDICINE | Facility: CLINIC | Age: 85
End: 2025-01-08
Payer: COMMERCIAL

## 2025-01-08 DIAGNOSIS — I10 ESSENTIAL HYPERTENSION WITH GOAL BLOOD PRESSURE LESS THAN 140/90: Primary | ICD-10-CM

## 2025-01-08 DIAGNOSIS — M25.561 RIGHT KNEE PAIN, UNSPECIFIED CHRONICITY: ICD-10-CM

## 2025-01-08 ASSESSMENT — ACTIVITIES OF DAILY LIVING (ADL): DEPENDENT_IADLS:: CLEANING

## 2025-01-08 ASSESSMENT — SOCIAL DETERMINANTS OF HEALTH (SDOH)
IN A TYPICAL WEEK, HOW MANY TIMES DO YOU TALK ON THE PHONE WITH FAMILY, FRIENDS, OR NEIGHBORS?: MORE THAN THREE TIMES A WEEK
HOW OFTEN DO YOU ATTEND CHURCH OR RELIGIOUS SERVICES?: MORE THAN 4 TIMES PER YEAR
HOW OFTEN DO YOU GET TOGETHER WITH FRIENDS OR RELATIVES?: ONCE A WEEK
HOW OFTEN DO YOU ATTENT MEETINGS OF THE CLUB OR ORGANIZATION YOU BELONG TO?: NEVER
DO YOU BELONG TO ANY CLUBS OR ORGANIZATIONS SUCH AS CHURCH GROUPS UNIONS, FRATERNAL OR ATHLETIC GROUPS, OR SCHOOL GROUPS?: NO

## 2025-01-08 ASSESSMENT — PATIENT HEALTH QUESTIONNAIRE - PHQ9: SUM OF ALL RESPONSES TO PHQ QUESTIONS 1-9: 0

## 2025-01-08 NOTE — PROGRESS NOTES
[facility] Care Coordination Contact      [facility] Home Visit Assessment     Home visit for Health Risk Assessment with Chip Parismaty completed on January 8, 2025    Type of residence:: Private home - stairs    Chip lives with his son and daughter-in-law in a single-family house in [place].        Assessment completed with:: Patient, Care Team Member    Current Care Plan  Member currently receiving the following home care services:   None  Member currently receiving the following community resources: Lifeline      Medication Review  Medication reconciliation completed in Epic: Yes  Medication set-up & administration: Independent and sets up on own weekly.  Self-administers medications.  Medication Risk Assessment Medication (1 or more, place referral to MTM): N/A: No risk factors identified  MTM Referral Placed: No: No risk factors idenified    Mental/Behavioral Health   Depression Screening:       3/21/2023     3:24 PM 2/7/2024     3:04 PM 1/8/2025    11:45 AM   PHQ   PHQ-9 Total Score 0 1 0   Q9: Thoughts of better off dead/self-harm past 2 weeks Not at all Not at all Not at all      PHQ-2 Total Score (Adult) - Positive if 3 or more points; Administer PHQ-9 if positive: 0  PHQ-9 Total Score: 0    Mental health DX:: Yes   Mental health DX how managed:: None    Falls Assessment:   Fallen 2 or more times in the past year?: No   Any fall with injury in the past year?: No    ADL/IADL Dependencies:   Dependent ADLs:: Ambulation-cane  Dependent IADLs:: Cleaning, Personal paperwork    Health Plan sponsored benefits: [employer]: Shared information regarding One Pass Fitness Program. Reviewed preventative health screening and health plan supplemental benefits/incentives. Reviewed medication disposal form and transition of care member handout.    CFSS Assessment completed at visit: Not Applicable     Elderly Waiver Eligibility: Yes-will continue on EW    Care Plan & Recommendations:    Chip would like a  script for a new blood pressure machine as his old one ( over 10 years old) is not inflating any longer.  Script pended, please sign.  Chip can use his out-of-pocket [employer] benefit to purchase.  Chip is asking for a refill on Lidocaine Patch, pended, please review and sign.  He has reported using it only as needed.  The old prescription has .    [facility] for detailed assessment information.    Follow-Up Plan: Member informed of future contact, plan to f/u with member with a 6 month telephone assessment.  Contact information shared with member and family, encouraged member to call with any questions or concerns at any time.    [facility] care continuum providers: Please see Snapshot and Care Management Flowsheets for Specific details of care plan.    This CC note routed to PCP, Liam Esquivel RN  [facility]  Cell: [phone]

## 2025-01-16 ENCOUNTER — PATIENT OUTREACH (OUTPATIENT)
Dept: GERIATRIC MEDICINE | Facility: CLINIC | Age: 85
End: 2025-01-16
Payer: COMMERCIAL

## 2025-01-16 RX ORDER — LIDOCAINE 50 MG/G
PATCH TOPICAL
Qty: 30 PATCH | Refills: 4 | Status: CANCELLED | OUTPATIENT
Start: 2025-01-16

## 2025-01-16 RX ORDER — LIDOCAINE 50 MG/G
PATCH TOPICAL
Qty: 30 PATCH | Refills: 4 | Status: SHIPPED
Start: 2025-01-16

## 2025-01-16 NOTE — TELEPHONE ENCOUNTER
Called pharmacy and gave a verbal order for Lidocaine Patches to pharmacist.      Prescription signed as no print out only with message that provider wanted RN to call this medication in to pharmacy due to e scribing issues.    Updated prescription in chart.    Called patient with a Bin ([phone]) and left a message to call the clinic back.   -----------------------------------------------------------------------------------------------  Upon call back:  DME for new blood pressure cuff is signed.  Please let the patient know.      DME printed off when signing the prescriptions.

## 2025-01-16 NOTE — TELEPHONE ENCOUNTER
[facility] Care Coordination Contact    Received after visit chart from care coordinator.  Completed following tasks: Mailed copy of support plan to member, Mailed [place] Choices signature sheet pages 3-4, Mailed Safe Medication Disposal , Mailed Transition of Care Member Handout, Submitted referrals/auths for PERS, and Updated services in Database.   and Provider Signature - No Support Plan Shared:  Member indicates that they do not want their support plan shared with any EW providers.    Ana Hodges  Care Management Specialist  [facility]  [phone]

## 2025-01-16 NOTE — TELEPHONE ENCOUNTER
"Lidoderm Rx requires \"Department Phone\" and will not allow Rx to be E-prescribed.   Could this Rx be phoned in or could the Rx info be entered as needed?  "

## 2025-01-16 NOTE — LETTER
January 16, 2025       [facility]  [address] [place] [place] [address]      Dear Diego,    At [facility], we re dedicated to improving your health and wellness. Enclosed is the Support Plan developed with you on 01/08/2025. Please review the Support Plan carefully.    As a reminder, during your visit we talked about:   Ways to manage your physical and mental health   Using health care to maintain and improve your health    Your preventive care needs      Remember to contact your care coordinator if you:   Are hospitalized or plan to be hospitalized    Have a fall     Have a change in your physical or mental health   Need help finding support or services    If you have questions or don t agree with your Support Plan, call me at [phone]. You can also call me if your needs change. TTY users call the [place] Relay at 309 or 1-391.803.9619 (fmsjsq-ze-wzftzy relay service).    Sincerely,         Leonor Stack RN  [phone]  [email or web address]                [id number] accepted     (06/2024)                [address], [place] [address]  [phone]  fax [phone]  [facility]

## 2025-01-29 ENCOUNTER — PATIENT OUTREACH (OUTPATIENT)
Dept: GERIATRIC MEDICINE | Facility: CLINIC | Age: 85
End: 2025-01-29
Payer: COMMERCIAL

## 2025-01-29 NOTE — PROGRESS NOTES
[facility] Care Coordination Contact    Received a request to submit a DTR for the reduced of [employer]. Documentation completed and faxed to the health plan. Care Coordinator aware.    Rosa Wright RN  Utilization   [facility]  [phone]

## 2025-03-24 ENCOUNTER — PATIENT OUTREACH (OUTPATIENT)
Dept: CARE COORDINATION | Facility: CLINIC | Age: 85
End: 2025-03-24
Payer: COMMERCIAL

## 2025-03-29 ENCOUNTER — HEALTH MAINTENANCE LETTER (OUTPATIENT)
Age: 85
End: 2025-03-29

## 2025-04-14 ENCOUNTER — LAB (OUTPATIENT)
Dept: LAB | Facility: CLINIC | Age: 85
End: 2025-04-14
Payer: COMMERCIAL

## 2025-04-14 DIAGNOSIS — E11.21 TYPE 2 DIABETES MELLITUS WITH DIABETIC NEPHROPATHY, WITHOUT LONG-TERM CURRENT USE OF INSULIN (H): ICD-10-CM

## 2025-04-14 DIAGNOSIS — E78.5 HYPERLIPIDEMIA LDL GOAL <100: ICD-10-CM

## 2025-04-14 LAB
ALBUMIN SERPL BCG-MCNC: 4.3 G/DL (ref 3.5–5.2)
ALP SERPL-CCNC: 54 U/L (ref 40–150)
ALT SERPL W P-5'-P-CCNC: 47 U/L (ref 0–70)
ANION GAP SERPL CALCULATED.3IONS-SCNC: 14 MMOL/L (ref 7–15)
AST SERPL W P-5'-P-CCNC: 63 U/L (ref 0–45)
BILIRUB SERPL-MCNC: 0.6 MG/DL
BUN SERPL-MCNC: 24.6 MG/DL (ref 8–23)
CALCIUM SERPL-MCNC: 9.7 MG/DL (ref 8.8–10.4)
CHLORIDE SERPL-SCNC: 98 MMOL/L (ref 98–107)
CHOLEST SERPL-MCNC: 152 MG/DL
CREAT SERPL-MCNC: 1.73 MG/DL (ref 0.67–1.17)
EGFRCR SERPLBLD CKD-EPI 2021: 38 ML/MIN/1.73M2
EST. AVERAGE GLUCOSE BLD GHB EST-MCNC: 206 MG/DL
FASTING STATUS PATIENT QL REPORTED: YES
FASTING STATUS PATIENT QL REPORTED: YES
GLUCOSE SERPL-MCNC: 158 MG/DL (ref 70–99)
HBA1C MFR BLD: 8.8 % (ref 0–5.6)
HCO3 SERPL-SCNC: 23 MMOL/L (ref 22–29)
HDLC SERPL-MCNC: 37 MG/DL
LDLC SERPL CALC-MCNC: 70 MG/DL
NONHDLC SERPL-MCNC: 115 MG/DL
POTASSIUM SERPL-SCNC: 4.3 MMOL/L (ref 3.4–5.3)
PROT SERPL-MCNC: 8.7 G/DL (ref 6.4–8.3)
SODIUM SERPL-SCNC: 135 MMOL/L (ref 135–145)
TRIGL SERPL-MCNC: 223 MG/DL
TSH SERPL DL<=0.005 MIU/L-ACNC: 1.81 UIU/ML (ref 0.3–4.2)

## 2025-04-14 PROCEDURE — 84443 ASSAY THYROID STIM HORMONE: CPT

## 2025-04-14 PROCEDURE — 80053 COMPREHEN METABOLIC PANEL: CPT

## 2025-04-14 PROCEDURE — 83036 HEMOGLOBIN GLYCOSYLATED A1C: CPT

## 2025-04-14 PROCEDURE — 80061 LIPID PANEL: CPT

## 2025-04-14 PROCEDURE — 36415 COLL VENOUS BLD VENIPUNCTURE: CPT

## 2025-04-23 ENCOUNTER — OFFICE VISIT (OUTPATIENT)
Dept: INTERNAL MEDICINE | Facility: CLINIC | Age: 85
End: 2025-04-23
Payer: COMMERCIAL

## 2025-04-23 VITALS
WEIGHT: 165 LBS | HEART RATE: 75 BPM | BODY MASS INDEX: 32.39 KG/M2 | TEMPERATURE: 97.9 F | DIASTOLIC BLOOD PRESSURE: 66 MMHG | RESPIRATION RATE: 16 BRPM | OXYGEN SATURATION: 97 % | HEIGHT: 60 IN | SYSTOLIC BLOOD PRESSURE: 118 MMHG

## 2025-04-23 DIAGNOSIS — E78.5 HYPERLIPIDEMIA LDL GOAL <100: ICD-10-CM

## 2025-04-23 DIAGNOSIS — E11.21 TYPE 2 DIABETES MELLITUS WITH DIABETIC NEPHROPATHY, WITHOUT LONG-TERM CURRENT USE OF INSULIN (H): Primary | ICD-10-CM

## 2025-04-23 DIAGNOSIS — E11.22 TYPE 2 DIABETES MELLITUS WITH STAGE 3B CHRONIC KIDNEY DISEASE, WITHOUT LONG-TERM CURRENT USE OF INSULIN (H): ICD-10-CM

## 2025-04-23 DIAGNOSIS — K21.9 GASTROESOPHAGEAL REFLUX DISEASE WITHOUT ESOPHAGITIS: ICD-10-CM

## 2025-04-23 DIAGNOSIS — N18.32 TYPE 2 DIABETES MELLITUS WITH STAGE 3B CHRONIC KIDNEY DISEASE, WITHOUT LONG-TERM CURRENT USE OF INSULIN (H): ICD-10-CM

## 2025-04-23 DIAGNOSIS — I10 ESSENTIAL HYPERTENSION WITH GOAL BLOOD PRESSURE LESS THAN 140/90: ICD-10-CM

## 2025-04-23 PROCEDURE — 3078F DIAST BP <80 MM HG: CPT | Performed by: INTERNAL MEDICINE

## 2025-04-23 PROCEDURE — 3074F SYST BP LT 130 MM HG: CPT | Performed by: INTERNAL MEDICINE

## 2025-04-23 PROCEDURE — 99214 OFFICE O/P EST MOD 30 MIN: CPT | Performed by: INTERNAL MEDICINE

## 2025-04-23 PROCEDURE — G2211 COMPLEX E/M VISIT ADD ON: HCPCS | Performed by: INTERNAL MEDICINE

## 2025-04-23 RX ORDER — HYDROCHLOROTHIAZIDE 12.5 MG/1
CAPSULE ORAL
Status: SHIPPED
Start: 2025-04-23

## 2025-04-23 RX ORDER — BLOOD SUGAR DIAGNOSTIC
STRIP MISCELLANEOUS
Qty: 100 STRIP | Refills: 3 | Status: SHIPPED | OUTPATIENT
Start: 2025-04-23

## 2025-04-23 RX ORDER — FAMOTIDINE 20 MG/1
20 TABLET, FILM COATED ORAL 2 TIMES DAILY
Status: SHIPPED
Start: 2025-04-23

## 2025-04-23 NOTE — PATIENT INSTRUCTIONS
Someone will call your son to help schedule an appointment with the Diabetes Educator.   They can discuss options to help improve A1c.     Then see Dr Esquivel in the office in three months, and schedule a lab appointment a few days in advance.

## 2025-04-23 NOTE — PROGRESS NOTES
"  Assessment & Plan   (E11.21) Type 2 diabetes mellitus with diabetic nephropathy, without long-term current use of insulin (H)  (primary encounter diagnosis)  Comment: - Diabetes control has worsened, with A1c increasing from 7.3 in October to 8.8. Proteinuria persists, managed with losartan.  - Arrange a meeting with a diabetes educator to discuss potential medication changes or the use of a continuous glucose monitor. Follow-up in 3 months with A1c test a few days prior.  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  - Continue current management. Follow-up in 3 months.    (E78.5) Hyperlipidemia LDL goal <100  Comment: Recent LDL-Cholesterol at target. Continue current meds.     (I10) Essential hypertension with goal blood pressure less than 140/90  Comment: BP at target. Continue current meds.   Plan: hydrochlorothiazide (MICROZIDE) 12.5 MG capsule          (K21.9) Gastroesophageal reflux disease without esophagitis  Comment: Continue famotidine 20 mg twice daily.   Plan: famotidine (PEPCID) 20 MG tablet              BMI  Estimated body mass index is 33.33 kg/m  as calculated from the following:    Height as of this encounter: 1.499 m (4' 11\").    Weight as of this encounter: 74.8 kg (165 lb).   Weight management plan: Discussed healthy diet and exercise guidelines      Patient Instructions   Someone will call your son to help schedule an appointment with the Diabetes Educator.   They can discuss options to help improve A1c.     Then see Dr Esquivel in the office in three months, and schedule a lab appointment a few days in advance.       Subjective "   Chip is a 85 year old, presenting for the following health issues:  Follow Up and Diabetes        4/23/2025     2:21 PM   Additional Questions   Roomed by Shavonne PENA CMA     History of Present Illness       Diabetes:   He presents for follow up of diabetes.  He is checking home blood glucose one time daily.   He checks blood glucose before meals.  Blood glucose is never over 200 and never under 70.  When his blood glucose is low, the patient is asymptomatic for confusion, blurred vision, lethargy and reports not feeling dizzy, shaky, or weak.   He has no concerns regarding his diabetes at this time.   He is not experiencing numbness or burning in feet, excessive thirst, blurry vision, weight changes or redness, sores or blisters on feet.           He eats 2-3 servings of fruits and vegetables daily.He consumes 1 sweetened beverage(s) daily.He exercises with enough effort to increase his heart rate 20 to 29 minutes per day.  He exercises with enough effort to increase his heart rate 4 days per week.   He is taking medications regularly.        - Here for a diabetes checkup  - Recent lab tests show urine still spills protein, attributed to diabetes  - A1c increased from 7.3 in October to 8.8  - No changes in diabetes medications since October  - No reported symptoms of cough, trouble breathing, chest tightness, stomach or bowel issues, dizziness, heart racing, sudden changes in vision or speech, or sudden weakness or numbness of limbs  - Mentioned that A1c might be high due to dietary habits around birthday celebrations      Past medical, family and social histories as well as medications reviewed and updated as needed.    No dyspnea or cough. No chest discomfort, dizziness or palpitations. No diarrhea, abdominal pain or rectal bleeding.   No acute problems with vision or speech, lateralizing weakness or paresthesias.    ROS: as above or negative for Respiratory, CV, GI, endocrine, neuro systems.       Objective    BP  "118/66 (BP Location: Right arm, Patient Position: Sitting, Cuff Size: Adult Large)   Pulse 75   Temp 97.9  F (36.6  C) (Tympanic)   Resp 16   Ht 1.499 m (4' 11\")   Wt 74.8 kg (165 lb)   SpO2 97%   BMI 33.33 kg/m    Body mass index is 33.33 kg/m .    Physical Exam   GENERAL: alert and no distress  RESP: lungs clear to auscultation - no rales, rhonchi or wheezes  CV: regular rate and rhythm, normal S1 S2, no S3 or S4, no murmur, click or rub, no peripheral edema        Signed Electronically by: Liam Esquivel MD,    "

## 2025-04-28 ENCOUNTER — PATIENT OUTREACH (OUTPATIENT)
Dept: CARE COORDINATION | Facility: CLINIC | Age: 85
End: 2025-04-28
Payer: COMMERCIAL

## 2025-05-01 ENCOUNTER — PATIENT OUTREACH (OUTPATIENT)
Dept: GERIATRIC MEDICINE | Facility: CLINIC | Age: 85
End: 2025-05-01
Payer: COMMERCIAL

## 2025-05-01 NOTE — PROGRESS NOTES
[facility] Care Coordination Contact    Chip left a message regarding his [employer] button and requested a callback.  Called and left a message for member to call back.    5/6/25: Left another message for Chip requesting call back.  5/6/25, 12.30pm: Chip called back letting me know that no further action is necessary as everything is working well for him now.      Luna Stack RN  [facility]  Cell: [phone]

## 2025-05-23 DIAGNOSIS — L50.9 URTICARIA: ICD-10-CM

## 2025-05-24 RX ORDER — CETIRIZINE HYDROCHLORIDE 10 MG/1
10 TABLET ORAL
Qty: 90 TABLET | Refills: 0 | Status: SHIPPED | OUTPATIENT
Start: 2025-05-24

## 2025-06-04 ENCOUNTER — TELEPHONE (OUTPATIENT)
Dept: INTERNAL MEDICINE | Facility: CLINIC | Age: 85
End: 2025-06-04
Payer: COMMERCIAL

## 2025-06-04 NOTE — TELEPHONE ENCOUNTER
Patient Quality Outreach    Patient is due for the following:   Diabetes -  Microalbumin and Diabetic Follow-Up Visit    Action(s) Taken:   Patient has upcoming appointment, these items will be addressed at that time.   7/24/2025 appointment with Dr. Esquivel.  Type of outreach:    Chart review performed, no outreach needed.    Questions for provider review:    None         Shavonne Burger, [facility]  Chart routed to None.

## 2025-06-12 NOTE — PATIENT INSTRUCTIONS
Copied from CRM #[id number]. Topic: General Inquiry - Return Call  >> Jun 12, 2025  3:10 PM Mare wrote:  Type:  Patient Requesting Call    Who Called: [facility]  Does the patient know what this is regarding?:calling on behalf of the pt to speak with nurse regarding pt last colonoscopy   Would the patient rather a call back or a response via [facility]? call  Best Call Back Number: [phone]  Additional Information: fax [phone]    Tried to call office just kept doing a weird ringing sound. -abo    No medication changes.     See the diabetes educator again.     Get repeat blood tests in three months, and then see me for an appointment a few days later.

## 2025-07-01 ENCOUNTER — PATIENT OUTREACH (OUTPATIENT)
Dept: GERIATRIC MEDICINE | Facility: CLINIC | Age: 85
End: 2025-07-01
Payer: COMMERCIAL

## 2025-07-01 NOTE — PROGRESS NOTES
[facility] Care Coordination Contact    Member called and left a message to call him back regarding metformin refill.    Called member back and left a message to call back, or I will try calling again tomorrow morning.    I called the pharmacy to verify that refills on metformin are available. Per chart review, member has refills remaining on the script from 10/23/24 (#360 with 3 additional refills).  Per pharmacy, they dispensed a partial refill only a few days ago and the remaining is now ready for .    I called member and left a detailed message informing him that metformin is ready for .    Luna Stack RN  [facility]  Cell: [phone]

## 2025-07-10 ENCOUNTER — PATIENT OUTREACH (OUTPATIENT)
Dept: GERIATRIC MEDICINE | Facility: CLINIC | Age: 85
End: 2025-07-10
Payer: COMMERCIAL

## 2025-07-10 NOTE — Clinical Note
Hello,  I am a care coordinator with [facility].  I completed a 6-month assessment with Chip today and he is doing well.  He is taking all his medications as directed.  He is scheduled for labs and then an appointment with you this month.  Please reach out with any questions.  Thank you. Luna Stack RN [facility] Cell: [phone]

## 2025-07-10 NOTE — PROGRESS NOTES
[facility] Care Coordination Contact      [facility] Six-Month Telephone Assessment    6 month telephone assessment completed on 7/10/25.    ER visits: No  Hospitalizations: No  TCU stays: No  Significant health status changes: none  Falls/Injuries: No  ADL/IADL changes: No  Changes in services: No    Caregiver Assessment follow up:  n/a    Goals: See Support Plan for goal progress documentation.      Will see member in 6 months for an annual health risk assessment.   Encouraged member to call CC with any questions or concerns in the meantime.     Luna Stack RN  [facility]  Cell: [phone]

## 2025-07-17 ENCOUNTER — LAB (OUTPATIENT)
Dept: LAB | Facility: CLINIC | Age: 85
End: 2025-07-17
Payer: COMMERCIAL

## 2025-07-17 DIAGNOSIS — N18.32 CHRONIC KIDNEY DISEASE, STAGE 3B (H): Primary | ICD-10-CM

## 2025-07-17 DIAGNOSIS — E11.21 TYPE 2 DIABETES MELLITUS WITH DIABETIC NEPHROPATHY, WITHOUT LONG-TERM CURRENT USE OF INSULIN (H): ICD-10-CM

## 2025-07-17 LAB
CREAT UR-MCNC: 121 MG/DL
EST. AVERAGE GLUCOSE BLD GHB EST-MCNC: 163 MG/DL
HBA1C MFR BLD: 7.3 % (ref 0–5.6)
HGB BLD-MCNC: 13.8 G/DL (ref 13.3–17.7)
MCV RBC AUTO: 83 FL (ref 78–100)
MICROALBUMIN UR-MCNC: 593 MG/L
MICROALBUMIN/CREAT UR: 490.08 MG/G CR (ref 0–17)
PHOSPHATE SERPL-MCNC: 2.3 MG/DL (ref 2.5–4.5)
PTH-INTACT SERPL-MCNC: 50 PG/ML (ref 15–65)

## 2025-07-24 ENCOUNTER — OFFICE VISIT (OUTPATIENT)
Dept: INTERNAL MEDICINE | Facility: CLINIC | Age: 85
End: 2025-07-24
Payer: COMMERCIAL

## 2025-07-24 VITALS
RESPIRATION RATE: 16 BRPM | BODY MASS INDEX: 32.2 KG/M2 | HEART RATE: 73 BPM | TEMPERATURE: 97.5 F | WEIGHT: 164 LBS | DIASTOLIC BLOOD PRESSURE: 66 MMHG | HEIGHT: 60 IN | SYSTOLIC BLOOD PRESSURE: 122 MMHG | OXYGEN SATURATION: 98 %

## 2025-07-24 DIAGNOSIS — I10 ESSENTIAL HYPERTENSION WITH GOAL BLOOD PRESSURE LESS THAN 140/90: ICD-10-CM

## 2025-07-24 DIAGNOSIS — M25.511 RIGHT SHOULDER PAIN, UNSPECIFIED CHRONICITY: ICD-10-CM

## 2025-07-24 DIAGNOSIS — E78.5 HYPERLIPIDEMIA LDL GOAL <100: ICD-10-CM

## 2025-07-24 DIAGNOSIS — K21.9 GASTROESOPHAGEAL REFLUX DISEASE WITHOUT ESOPHAGITIS: ICD-10-CM

## 2025-07-24 DIAGNOSIS — E11.22 TYPE 2 DIABETES MELLITUS WITH STAGE 3B CHRONIC KIDNEY DISEASE, WITHOUT LONG-TERM CURRENT USE OF INSULIN (H): Primary | ICD-10-CM

## 2025-07-24 DIAGNOSIS — N18.32 TYPE 2 DIABETES MELLITUS WITH STAGE 3B CHRONIC KIDNEY DISEASE, WITHOUT LONG-TERM CURRENT USE OF INSULIN (H): Primary | ICD-10-CM

## 2025-07-24 DIAGNOSIS — Z13.1 SCREENING FOR DIABETES MELLITUS: ICD-10-CM

## 2025-07-24 DIAGNOSIS — E11.21 TYPE 2 DIABETES MELLITUS WITH DIABETIC NEPHROPATHY, WITHOUT LONG-TERM CURRENT USE OF INSULIN (H): ICD-10-CM

## 2025-07-24 DIAGNOSIS — Z13.220 SCREENING FOR HYPERLIPIDEMIA: ICD-10-CM

## 2025-07-24 RX ORDER — AMLODIPINE BESYLATE 5 MG/1
5 TABLET ORAL DAILY
Qty: 90 TABLET | Refills: 3 | Status: SHIPPED | OUTPATIENT
Start: 2025-07-24

## 2025-07-24 RX ORDER — HYDROCHLOROTHIAZIDE 12.5 MG/1
1 CAPSULE ORAL DAILY
Qty: 90 CAPSULE | Refills: 0 | Status: SHIPPED | OUTPATIENT
Start: 2025-07-24

## 2025-07-24 RX ORDER — FAMOTIDINE 20 MG/1
20 TABLET, FILM COATED ORAL 2 TIMES DAILY
Qty: 180 TABLET | Refills: 1 | Status: SHIPPED | OUTPATIENT
Start: 2025-07-24

## 2025-07-24 RX ORDER — ATENOLOL 50 MG/1
50 TABLET ORAL DAILY
Qty: 90 TABLET | Refills: 3 | Status: SHIPPED | OUTPATIENT
Start: 2025-07-24

## 2025-07-24 NOTE — PATIENT INSTRUCTIONS
Lab results look good!    I've refilled needed medications.     See me in about six months, with labs a few days in advance.

## 2025-07-24 NOTE — PROGRESS NOTES
Assessment & Plan   (E11.22,  N18.32) Type 2 diabetes mellitus with stage 3b chronic kidney disease, without long-term current use of insulin (H)  (primary encounter diagnosis)  Comment: Recent A1c greatly improved. - Diabetes control has improved, with A1c reduced from 8.8 to 7.3 since April.  - Continue current management. Schedule follow-up in 6 months with labs prior to the appointment.  Plan: Hemoglobin A1c, TSH with free T4 reflex          (E11.21) Type 2 diabetes mellitus with diabetic nephropathy, without long-term current use of insulin (H)  Plan: Hemoglobin A1c, TSH with free T4 reflex          (E78.5) Hyperlipidemia LDL goal <100  Comment: LDL at target. Continue current meds.   Plan: Comprehensive metabolic panel, Lipid panel         reflex to direct LDL Fasting          (I10) Essential hypertension with goal blood pressure less than 140/90  Comment: BP at target. Continue current meds.   Plan: amLODIPine (NORVASC) 5 MG tablet, atenolol         (TENORMIN) 50 MG tablet, hydrochlorothiazide         (MICROZIDE) 12.5 MG capsule          (M25.511) Right shoulder pain, unspecified chronicity  Comment: Previous Rx for lidoderm patch evidently not covered by insurance. Offered Rx for topical ketoprofen cream.   Consider referral to orthopedic or sports medicine doctor if pain persists.  Plan: Ketoprofen 10 % CREA          (K21.9) Gastroesophageal reflux disease without esophagitis  Comment: Well controlled. Continue current meds.   Plan: famotidine (PEPCID) 20 MG tablet          (Z13.220) Screening for hyperlipidemia  Plan: Lipid panel reflex to direct LDL Fasting      Patient Instructions   Lab results look good!    I've refilled needed medications.     See me in about six months, with labs a few days in advance.      Sarabjit Saunders is a 85 year old, presenting for the following health issues:  Follow Up and Diabetes      7/24/2025     1:45 PM   Additional Questions   Roomed by Shavonne PENA CMA     History of  Present Illness       Diabetes:   He presents for follow up of diabetes.  He is checking home blood glucose one time daily.   He checks blood glucose before meals.  Blood glucose is never over 200 and never under 70.  When his blood glucose is low, the patient is asymptomatic for confusion, blurred vision, lethargy and reports not feeling dizzy, shaky, or weak.   He has no concerns regarding his diabetes at this time.   He is not experiencing numbness or burning in feet, excessive thirst, blurry vision, weight changes or redness, sores or blisters on feet.           He eats 2-3 servings of fruits and vegetables daily.He consumes 2 sweetened beverage(s) daily.He exercises with enough effort to increase his heart rate 30 to 60 minutes per day.  He exercises with enough effort to increase his heart rate 7 days per week.   He is taking medications regularly.          - Seen every few months for diabetes, cholesterol, and blood pressure management  - Diabetes control improved since April 2025, A1c decreased from 8.8 to 7.3  - No recent cough, trouble breathing, chest tightness, chest pressure, dizziness, heart racing, heartburn, stomach pain, diarrhea, sudden changes in vision or speech, sudden weakness or numbness of one arm or leg  - History of right knee pain, currently mainly right shoulder pain  - Previously prescribed lidocaine (Lidoderm) patch for pain--evidently pharmacist advised him that this was not covered by his insurance. He is offered an Rx for topical ketoprofen cream as an alternative. He declines a Sports Medicine referral.     Past medical, family and social histories as well as medications reviewed and updated as needed.    No dyspnea or cough. No chest discomfort, dizziness or palpitations. No diarrhea, abdominal pain or rectal bleeding.   No acute problems with vision or speech, lateralizing weakness or paresthesias.    ROS: as above or negative for Respiratory, CV, GI, endocrine, musculoskeletal,  "neuro systems.       Objective    /66 (BP Location: Right arm, Patient Position: Sitting, Cuff Size: Adult Regular)   Pulse 73   Temp 97.5  F (36.4  C) (Tympanic)   Resp 16   Ht 1.499 m (4' 11\")   Wt 74.4 kg (164 lb)   SpO2 98%   BMI 33.12 kg/m    Body mass index is 33.12 kg/m .    Physical Exam   GENERAL: alert and no distress  EYES: Eyes grossly normal to inspection, PERRL and conjunctivae and sclerae normal  RESP: lungs clear to auscultation - no rales, rhonchi or wheezes  CV: regular rate and rhythm, normal S1 S2, no S3 or S4, no murmur, click or rub, no peripheral edema  MS: no gross musculoskeletal defects noted, no edema  NEURO: Normal strength and tone, mentation intact and speech normal  PSYCH: mentation appears normal, affect normal/bright        Signed Electronically by: Liam Esquivel MD,     "

## 2025-09-02 DIAGNOSIS — E11.21 TYPE 2 DIABETES MELLITUS WITH DIABETIC NEPHROPATHY, WITHOUT LONG-TERM CURRENT USE OF INSULIN (H): ICD-10-CM

## 2025-09-02 DIAGNOSIS — I10 ESSENTIAL HYPERTENSION WITH GOAL BLOOD PRESSURE LESS THAN 140/90: ICD-10-CM

## 2025-09-02 RX ORDER — LOSARTAN POTASSIUM 100 MG/1
100 TABLET ORAL DAILY
Qty: 90 TABLET | Refills: 3 | OUTPATIENT
Start: 2025-09-02